# Patient Record
Sex: FEMALE | Race: WHITE | NOT HISPANIC OR LATINO | Employment: UNEMPLOYED | URBAN - METROPOLITAN AREA
[De-identification: names, ages, dates, MRNs, and addresses within clinical notes are randomized per-mention and may not be internally consistent; named-entity substitution may affect disease eponyms.]

---

## 2017-02-17 ENCOUNTER — TELEPHONE (OUTPATIENT)
Dept: GYNECOLOGIC ONCOLOGY | Facility: HOSPITAL | Age: 62
End: 2017-02-17

## 2017-02-17 DIAGNOSIS — R92.8 ABNORMAL MAMMOGRAM OF LEFT BREAST: Primary | ICD-10-CM

## 2017-02-17 NOTE — TELEPHONE ENCOUNTER
Focal asymmetry in the left breast is probably benign.  Follow-up in 6 months is  recommended.    Essentially stable mammograms with no definite evidence of malignancy  identified.  Correlation with clinical findings is suggested; if a clinical  abnormality is noted, then it is suggested to proceed on a clinical basis.  Otherwise, if none is noted, then follow up Bilat. Mammogra,s in 6 mts are  suggested.    ACR BI-RADS Category 3: Probably Benign      Recommended MMG ordered. msg to nurse to schedule.

## 2017-08-22 PROBLEM — Z01.419 WELL WOMAN EXAM: Status: ACTIVE | Noted: 2017-08-22

## 2018-02-23 ENCOUNTER — TELEPHONE (OUTPATIENT)
Dept: OBSTETRICS AND GYNECOLOGY | Facility: HOSPITAL | Age: 63
End: 2018-02-23

## 2018-02-23 DIAGNOSIS — R92.8 ABNORMAL MAMMOGRAM: Primary | ICD-10-CM

## 2019-09-10 ENCOUNTER — OFFICE VISIT (OUTPATIENT)
Dept: INTERNAL MEDICINE | Facility: CLINIC | Age: 64
End: 2019-09-10
Payer: MEDICARE

## 2019-09-10 ENCOUNTER — HOSPITAL ENCOUNTER (OUTPATIENT)
Dept: RADIOLOGY | Facility: HOSPITAL | Age: 64
Discharge: HOME | End: 2019-09-10
Attending: INTERNAL MEDICINE
Payer: MEDICARE

## 2019-09-10 VITALS
HEIGHT: 62 IN | DIASTOLIC BLOOD PRESSURE: 86 MMHG | SYSTOLIC BLOOD PRESSURE: 148 MMHG | BODY MASS INDEX: 25.03 KG/M2 | WEIGHT: 136 LBS

## 2019-09-10 DIAGNOSIS — M79.671 RIGHT FOOT PAIN: ICD-10-CM

## 2019-09-10 DIAGNOSIS — Z12.31 ENCOUNTER FOR SCREENING MAMMOGRAM FOR BREAST CANCER: ICD-10-CM

## 2019-09-10 DIAGNOSIS — Z13.6 SCREENING FOR CARDIOVASCULAR CONDITION: ICD-10-CM

## 2019-09-10 DIAGNOSIS — R03.0 ELEVATED BLOOD PRESSURE READING: ICD-10-CM

## 2019-09-10 DIAGNOSIS — M25.552 LEFT HIP PAIN: ICD-10-CM

## 2019-09-10 DIAGNOSIS — F40.243 FLYING PHOBIA: ICD-10-CM

## 2019-09-10 DIAGNOSIS — M25.552 LEFT HIP PAIN: Primary | ICD-10-CM

## 2019-09-10 DIAGNOSIS — Z96.641 STATUS POST TOTAL REPLACEMENT OF RIGHT HIP: ICD-10-CM

## 2019-09-10 PROCEDURE — 99204 PR OFFICE/OUTPT VISIT, NEW, LEVL IV, 45-59 MIN: ICD-10-PCS | Mod: S$GLB,,, | Performed by: INTERNAL MEDICINE

## 2019-09-10 PROCEDURE — 73502 X-RAY EXAM HIP UNI 2-3 VIEWS: CPT | Mod: 26,LT,, | Performed by: RADIOLOGY

## 2019-09-10 PROCEDURE — 1101F PT FALLS ASSESS-DOCD LE1/YR: CPT | Mod: CPTII,S$GLB,, | Performed by: INTERNAL MEDICINE

## 2019-09-10 PROCEDURE — 73630 XR FOOT COMPLETE 3 VIEW RIGHT: ICD-10-PCS | Mod: 26,RT,, | Performed by: RADIOLOGY

## 2019-09-10 PROCEDURE — 99999 PR PBB SHADOW E&M-EST. PATIENT-LVL IV: ICD-10-PCS | Mod: PBBFAC,,, | Performed by: INTERNAL MEDICINE

## 2019-09-10 PROCEDURE — 3008F BODY MASS INDEX DOCD: CPT | Mod: CPTII,S$GLB,, | Performed by: INTERNAL MEDICINE

## 2019-09-10 PROCEDURE — 73502 X-RAY EXAM HIP UNI 2-3 VIEWS: CPT | Mod: TC,LT

## 2019-09-10 PROCEDURE — 73502 XR HIP 2 VIEW LEFT: ICD-10-PCS | Mod: 26,LT,, | Performed by: RADIOLOGY

## 2019-09-10 PROCEDURE — 73630 X-RAY EXAM OF FOOT: CPT | Mod: 26,RT,, | Performed by: RADIOLOGY

## 2019-09-10 PROCEDURE — 99204 OFFICE O/P NEW MOD 45 MIN: CPT | Mod: S$GLB,,, | Performed by: INTERNAL MEDICINE

## 2019-09-10 PROCEDURE — 73630 X-RAY EXAM OF FOOT: CPT | Mod: TC,RT

## 2019-09-10 PROCEDURE — 1101F PR PT FALLS ASSESS DOC 0-1 FALLS W/OUT INJ PAST YR: ICD-10-PCS | Mod: CPTII,S$GLB,, | Performed by: INTERNAL MEDICINE

## 2019-09-10 PROCEDURE — 3008F PR BODY MASS INDEX (BMI) DOCUMENTED: ICD-10-PCS | Mod: CPTII,S$GLB,, | Performed by: INTERNAL MEDICINE

## 2019-09-10 PROCEDURE — 99999 PR PBB SHADOW E&M-EST. PATIENT-LVL IV: CPT | Mod: PBBFAC,,, | Performed by: INTERNAL MEDICINE

## 2019-09-10 RX ORDER — ALPRAZOLAM 0.25 MG/1
0.25 TABLET ORAL 3 TIMES DAILY PRN
Qty: 3 TABLET | Refills: 0 | Status: SHIPPED | OUTPATIENT
Start: 2019-09-10 | End: 2019-09-13

## 2019-09-10 NOTE — MEDICAL/APP STUDENT
"HPI: Mrs Wasserman is a 63 yo female who presents today to [facility] care. She has L sided hip pain that has been present for two years and has worsened. The pain is 8/10 and sharp. It radiates to the L groin and buttocks. It is alleviated by sitting and taking ibuprofen. The pain is worsened on climbing stairs and turning, which causes severe pain.    The pt also complains of bilateral foot pain. The right foot is painful on the ball of the foot and between the 3rd and fourth digits. It worsened in  when she walked barefoot on hard tile floor. The pain is sharp 9/10 and worsens on walking. It is worse at the end of the day. She has associated numbness and tingling of the R hallux after walking. Ibuprofen helps but does not completely alleviate the pain. The left foot pain is on the superior medial aspect and arch of the foot. The pain has been present for one year. She had an XRAY last yr that demonstrated arthritis. The pain is 7-8/10. It is worse in the evening and is alleviated by taking ibuprofen.    ROS: Neg except as above.    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 Former smoker <1 ppd/3yrs(during teens). Occasional ETOH, no illicit drug use.    Screening: colonoscopy  no abnormalities. Due for MMG.    P/E:BP (!) 148/86 (BP Location: Right arm, Patient Position: Sitting, BP Method: Medium (Manual))   Ht 5' 2" (1.575 m)   Wt 61.7 kg (136 lb)   BMI 24.87 kg/m² ]  HEENT: PERRL. Neck supple, no thyromegaly, no lymphadenopathy.  Card: RRR. S1/2 WNL. No added MRG  Resp: lungs clear to auscultation bilaterally  Hip: pain on hip flexion and external rotation  Foot: right  to " palpation between 3rd and 4th digits. Left foot non-tender. No edema, erythema.    Assessment:  1. Right hip pain  2. Bilateral foot pain  3. Annual screening    Plan:  1. Right hip xray, referral to orthopedics if abnormal  2. Bilateral foot xray, AP and lateral  3. Screening mmg  4. Serum lipids, TSH, CMP, CBC

## 2019-09-10 NOTE — PROGRESS NOTES
"Subjective:       Patient ID: Diann Wasserman is a 64 y.o. female.    Chief Complaint: Establish Care (is establishing care as a new patient. ); Foot Pain (reports of pain in ELLEN feet ( some numbness at joints of toes), started on last year. was prescribed Rx IBU to help. (takes 4 times a week).); Fall (had a fall about a year ago, sitting in a rolled chair, fell onto L wrist. ); and Hip Pain (reports of L hip pain. )    HPI   Diann Wasserman is a 64 y.o. female here to establish care for the following chronic issues:    Bilateral foot pain  -Right foot pain between 2nd and 3rd digits, ball of foot.  9/10 worse evening.   Ibuprofen helps.   Numbness right foot  -Left foot pain - told arthritis; started last year.     Left hip pain  S/p right hip OA post hip replacement 2014  Sharp 8/10 left pain, worse walking        Review of Systems   Constitutional: Negative for activity change and unexpected weight change.   HENT: Negative for hearing loss, rhinorrhea and trouble swallowing.    Eyes: Negative for discharge and visual disturbance.   Respiratory: Negative for chest tightness and wheezing.    Cardiovascular: Negative for chest pain and palpitations.   Gastrointestinal: Negative for blood in stool, constipation, diarrhea and vomiting.   Endocrine: Negative for polydipsia and polyuria.   Genitourinary: Negative for difficulty urinating, dysuria, hematuria and menstrual problem.   Musculoskeletal: Positive for arthralgias and neck pain. Negative for joint swelling.   Neurological: Positive for headaches. Negative for weakness.   Psychiatric/Behavioral: Negative for confusion and dysphoric mood.       Objective:   BP (!) 148/86 (BP Location: Right arm, Patient Position: Sitting, BP Method: Medium (Manual))   Ht 5' 2" (1.575 m)   Wt 61.7 kg (136 lb)   BMI 24.87 kg/m²      Physical Exam   Constitutional: She is oriented to person, place, and time. She appears well-developed and well-nourished. No " distress.   HENT:   Head: Normocephalic and atraumatic.   Cardiovascular: Normal rate and regular rhythm.   Pulmonary/Chest: Effort normal. No respiratory distress. She has no wheezes. She has no rales.   Musculoskeletal:   Discomfort with left hip flexion and external and internal rotation, Upper and lower extremity strength 5/5  No visible deformities bilateral feet  No swelling  No ttp right foot   Neurological: She is alert and oriented to person, place, and time.   Skin: Skin is warm and dry. She is not diaphoretic.   Psychiatric: She has a normal mood and affect. Her behavior is normal.       Assessment:       1. Left hip pain    2. Right foot pain    3. Screening for cardiovascular condition    4. Status post total replacement of right hip    5. Elevated blood pressure reading    6. Encounter for screening mammogram for breast cancer    7. Flying phobia        Plan:       Diann was seen today for establish care, foot pain, fall and hip pain.    Diagnoses and all orders for this visit:    Left hip pain  -     Ambulatory referral to Orthopedics; Future  -     X-Ray Hip 2 View Left; Future    Right foot pain - suspect Guido's neuroma  -     Ambulatory referral to Orthopedics; Future  -     X-Ray Foot Complete Right; Future    Screening for cardiovascular condition  -     Lipid panel; Future    Status post total replacement of right hip  Now w left hip pain    Elevated blood pressure reading  -     CBC auto differential; Future  -     Comprehensive metabolic panel; Future   Home bp log and review at nurse visit in 1 month    Encounter for screening mammogram for breast cancer  -     Mammo Digital Screening Bilat w/ [facility]; Future    Flying phobia  -     ALPRAZolam (XANAX) 0.25 MG tablet; Take 1 tablet (0.25 mg total) by mouth 3 (three) times daily as needed for Anxiety (flying). #3

## 2019-09-12 ENCOUNTER — TELEPHONE (OUTPATIENT)
Dept: ORTHOPEDICS | Facility: CLINIC | Age: 64
End: 2019-09-12

## 2019-09-12 NOTE — TELEPHONE ENCOUNTER
Spoke with pt to inform her that his appt with Ramiro JohnsonMihir on tomorrow morning had to be reschedule she opt to see another Provider. She will be seeing BC on tomorrow @8am.

## 2019-09-13 ENCOUNTER — OFFICE VISIT (OUTPATIENT)
Dept: ORTHOPEDICS | Facility: CLINIC | Age: 64
End: 2019-09-13
Payer: MEDICARE

## 2019-09-13 VITALS
DIASTOLIC BLOOD PRESSURE: 77 MMHG | WEIGHT: 136 LBS | HEART RATE: 61 BPM | BODY MASS INDEX: 25.03 KG/M2 | HEIGHT: 62 IN | SYSTOLIC BLOOD PRESSURE: 147 MMHG

## 2019-09-13 DIAGNOSIS — M25.552 LEFT HIP PAIN: ICD-10-CM

## 2019-09-13 DIAGNOSIS — M16.12 PRIMARY OSTEOARTHRITIS OF LEFT HIP: Primary | ICD-10-CM

## 2019-09-13 DIAGNOSIS — M79.671 RIGHT FOOT PAIN: ICD-10-CM

## 2019-09-13 PROCEDURE — 99203 PR OFFICE/OUTPT VISIT, NEW, LEVL III, 30-44 MIN: ICD-10-PCS | Mod: S$GLB,,, | Performed by: PHYSICIAN ASSISTANT

## 2019-09-13 PROCEDURE — 3008F PR BODY MASS INDEX (BMI) DOCUMENTED: ICD-10-PCS | Mod: CPTII,S$GLB,, | Performed by: PHYSICIAN ASSISTANT

## 2019-09-13 PROCEDURE — 99999 PR PBB SHADOW E&M-EST. PATIENT-LVL III: CPT | Mod: PBBFAC,,, | Performed by: PHYSICIAN ASSISTANT

## 2019-09-13 PROCEDURE — 1101F PT FALLS ASSESS-DOCD LE1/YR: CPT | Mod: CPTII,S$GLB,, | Performed by: PHYSICIAN ASSISTANT

## 2019-09-13 PROCEDURE — 99999 PR PBB SHADOW E&M-EST. PATIENT-LVL III: ICD-10-PCS | Mod: PBBFAC,,, | Performed by: PHYSICIAN ASSISTANT

## 2019-09-13 PROCEDURE — 99203 OFFICE O/P NEW LOW 30 MIN: CPT | Mod: S$GLB,,, | Performed by: PHYSICIAN ASSISTANT

## 2019-09-13 PROCEDURE — 3008F BODY MASS INDEX DOCD: CPT | Mod: CPTII,S$GLB,, | Performed by: PHYSICIAN ASSISTANT

## 2019-09-13 PROCEDURE — 1101F PR PT FALLS ASSESS DOC 0-1 FALLS W/OUT INJ PAST YR: ICD-10-PCS | Mod: CPTII,S$GLB,, | Performed by: PHYSICIAN ASSISTANT

## 2019-09-13 NOTE — PROGRESS NOTES
SUBJECTIVE:     Chief Complaint & History of Present Illness:  Diann Wasserman is a 64 y.o. year old female, new patient referred to Orthopedics by Dr. Greco, presenting today for intermittent left hip pain which started 2 years ago and right foot pain which started 2 months ago.  There is not a history of trauma.  The pain is located in the groin aspect of the hip.  The pain is described as sharp, 2/10.  It is is worse with weight bearing and is aggravated by walking.  There is not radiation into the leg.  Previous treatments include OTC NSAIDs and rest which have provided good relief.  There is not a history of previous injury or surgery to the hip.  The patient does not use an assistive device.    She reports that her right foot pain located between her 1st and 2nd metatarsal that does radiate into her toes.  She states the pain is mostly present when she is ambulating for a prolonged period of time.  She describes pain as sharp/tingling pain, 2/10.  She does get relief with OTC NSAID.        Past Medical History:   Diagnosis Date    Arthritis     Right hip pain        Past Surgical History:   Procedure Laterality Date    ARTHROPLASTY-HIP Right 11/6/2014    Performed by [facility] at [facility] OR    BREAST SURGERY Bilateral     breast lift    CHOLECYSTECTOMY  2012    COLONOSCOPY N/A 4/6/2016    Performed by Luis Bogran-Reyes, MD at [facility] ENDO    COSMETIC SURGERY  1985    bilateral breast lift    HIP SURGERY  11/2014    right total    JOINT REPLACEMENT      TUBAL LIGATION         Family History   Problem Relation Age of Onset    Breast cancer Maternal Aunt 40    Breast cancer Cousin     Hypothyroidism Mother     Hypertension Father     Aortic aneurysm Father        Review of patient's allergies indicates:   Allergen Reactions    Erythromycin Rash    Penicillins Rash and Other (See Comments)     Redness         Current Outpatient Medications:     ALPRAZolam (XANAX) 0.25 MG tablet,  Take 1 tablet (0.25 mg total) by mouth 3 (three) times daily as needed for Anxiety (flying)., Disp: 3 tablet, Rfl: 0    ibuprofen (ADVIL,MOTRIN) 600 MG tablet, Take 1 tablet (600 mg total) by mouth 2 (two) times daily as needed for Pain., Disp: 60 tablet, Rfl: 1    Review of Systems:  ROS:  Constitutional: no fever or chills  Eyes: no visual changes  ENT: no nasal congestion or sore throat  Respiratory: no cough or shortness of breath  Cardiovascular: no chest pain or palpitations  Gastrointestinal: no nausea or vomiting, tolerating diet  Genitourinary: no hematuria or dysuria  Integument/Breast: no rash or pruritis  Hematologic/Lymphatic: no easy bruising or lymphadenopathy  Musculoskeletal: positive for left hip pain and right foot pain  Neurological: no seizures or tremors  Behavioral/Psych: no auditory or visual hallucinations  Endocrine: no heat or cold intolerance      PE:  There were no vitals taken for this visit.  General: Pleasant, cooperative, NAD   HEENT: NCAT, sclera nonicteric   Lungs: Respirations are equal and unlabored.   Abdomen: Soft and non-tender.  CV: 2+ bilateral upper and lower extremity pulses.   Skin: Intact throughout LE with no rashes, erythema, or lesions  Extremities: No LE edema, NVI lower extremities    Hip Exam:  left  115 degrees flexion with pain in groin  0 degrees extension   15 degrees internal rotation with pain in groin  25 degrees external rotation with pain in groin  25 degrees abduction  10 degrees adduction   0 flexion contracture  (-)TTP over GTB  (-)SLR    right  Foot/Ankle  Skin: normal  Swelling: none  Warmth: no warmth  Tenderness: mild between 1st and 2nd distal metatarsal  Gait: normal  Neurological Exam: normal  Vascular Exam: normal    RADIOGRAPHS:  Xray of left hip taken on 9/10/19, images reviewed by me in clinic today, demonstrates minimal narrowing of joint space without evidence of fracture or dislocation.    Xray of right foot taken on 9/10/19, images  reviewed by me in clinic today, demonstrates well maintained joint spaces without evidence of fracture or dislocation. There is a inferior calcaneal heel spur.    ASSESSMENT/PLAN:     Plan: We discussed with the patient at length all the different treatment options available for arthrosis of the hip including anti-inflammatories, acetaminophen, rest, ice, lower extremity strengthening exercise, occasional cortisone injections for temporary relief, and finally total hip arthroplasty.   Patient referral placed to PT for left hip pain due to arthritis and possible labral injury.  She is to take OTC NSAID as needed and alternating this with Tylneol PRN.  If her symptoms persist at follow up, will likely order MRI of left hip.      I have recommended she purchase shoe inserts for her feet.  Guido's neuroma possible, however she is not interested in any surgery or injections at this time.  She is to follow up with me if this pain worsens.  Otherwise, continue OTC NSAID/Tylenol.  Patient verbalized understanding.    Final Diagnosis: Primary osteoarthritis hip, left. Left hip pain.  Right foot pain.

## 2019-09-13 NOTE — LETTER
September 13, 2019      Virginia Greco MD  [address]  [facility] [address]           [facility] - Orthopedics  [address], [address]  [facility] [address]  Phone: [phone]          Patient: Diann Wasserman   MR Number: [id number]   YOB: 1955   Date of Visit: 9/13/2019       Dear Dr. Virginia Greco:    Thank you for referring Diann Wasserman to me for evaluation. Attached you will find relevant portions of my assessment and plan of care.    If you have questions, please do not hesitate to call me. I look forward to following Diann Wasserman along with you.    Sincerely,    FERNANDA Collazoosure  CC:  No Recipients    If you would like to receive this communication electronically, please contact [email or web address] or [phone] to request more information on [facility] [employer] [facility] access.    For providers and/or their staff who would like to refer a patient to [facility], please contact us through our one-stop-shop provider referral line, [facility], at [phone].    If you feel you have received this communication in error or would no longer like to receive these types of communications, please e-mail [email or web address]

## 2019-09-24 ENCOUNTER — CLINICAL SUPPORT (OUTPATIENT)
Dept: INTERNAL MEDICINE | Facility: CLINIC | Age: 64
End: 2019-09-24
Payer: MEDICARE

## 2019-09-24 ENCOUNTER — TELEPHONE (OUTPATIENT)
Dept: INTERNAL MEDICINE | Facility: CLINIC | Age: 64
End: 2019-09-24

## 2019-09-24 ENCOUNTER — HOSPITAL ENCOUNTER (OUTPATIENT)
Dept: RADIOLOGY | Facility: HOSPITAL | Age: 64
Discharge: HOME | End: 2019-09-24
Attending: INTERNAL MEDICINE
Payer: MEDICARE

## 2019-09-24 VITALS — SYSTOLIC BLOOD PRESSURE: 146 MMHG | DIASTOLIC BLOOD PRESSURE: 82 MMHG

## 2019-09-24 DIAGNOSIS — Z12.31 ENCOUNTER FOR SCREENING MAMMOGRAM FOR BREAST CANCER: ICD-10-CM

## 2019-09-24 PROCEDURE — 77067 MAMMO DIGITAL SCREENING BILAT WITH TOMOSYNTHESIS_CAD: ICD-10-PCS | Mod: 26,,, | Performed by: RADIOLOGY

## 2019-09-24 PROCEDURE — 77067 SCR MAMMO BI INCL CAD: CPT | Mod: 26,,, | Performed by: RADIOLOGY

## 2019-09-24 PROCEDURE — 77063 MAMMO DIGITAL SCREENING BILAT WITH TOMOSYNTHESIS_CAD: ICD-10-PCS | Mod: 26,,, | Performed by: RADIOLOGY

## 2019-09-24 PROCEDURE — 77063 BREAST TOMOSYNTHESIS BI: CPT | Mod: 26,,, | Performed by: RADIOLOGY

## 2019-09-24 PROCEDURE — 99999 PR PBB SHADOW E&M-EST. PATIENT-LVL I: CPT | Mod: PBBFAC,,,

## 2019-09-24 PROCEDURE — 77067 SCR MAMMO BI INCL CAD: CPT | Mod: TC

## 2019-09-24 PROCEDURE — 99999 PR PBB SHADOW E&M-EST. PATIENT-LVL I: ICD-10-PCS | Mod: PBBFAC,,,

## 2019-09-24 NOTE — TELEPHONE ENCOUNTER
Pt came in today for BP check and provided reading from home. (placed on desk)  /82   Pt says she was very nervous and normally is when coming to the clinic.

## 2019-09-24 NOTE — TELEPHONE ENCOUNTER
Spoke with pt, advised to return to clinic with BP cuff so we can compare readings. verbalized understanding

## 2019-09-26 ENCOUNTER — TELEPHONE (OUTPATIENT)
Dept: INTERNAL MEDICINE | Facility: CLINIC | Age: 64
End: 2019-09-26

## 2019-09-26 ENCOUNTER — CLINICAL SUPPORT (OUTPATIENT)
Dept: INTERNAL MEDICINE | Facility: CLINIC | Age: 64
End: 2019-09-26
Payer: MEDICARE

## 2019-09-26 VITALS — DIASTOLIC BLOOD PRESSURE: 80 MMHG | SYSTOLIC BLOOD PRESSURE: 138 MMHG

## 2019-09-26 PROCEDURE — 99999 PR PBB SHADOW E&M-EST. PATIENT-LVL I: ICD-10-PCS | Mod: PBBFAC,,,

## 2019-09-26 PROCEDURE — 99999 PR PBB SHADOW E&M-EST. PATIENT-LVL I: CPT | Mod: PBBFAC,,,

## 2019-09-26 NOTE — TELEPHONE ENCOUNTER
Pt returned for BP check with home cuff to make sure home readings are accurate.   Manual /80  Automatic home cuff /82  Pt says she did not drink any coffee this morning (she normally has 2 cups of coffee in the morning) and I let her sit for a few minutes to relax.   Advised pt to continue to check readings at home and keep a log

## 2019-09-26 NOTE — PROGRESS NOTES
Pt returned for BP check with home cuff to make sure home readings are accurate.   Manual /80  Automatic home cuff /82  Advised pt to continue to check readings at home and keep a log.  PCP notified

## 2019-10-12 ENCOUNTER — OFFICE VISIT (OUTPATIENT)
Dept: URGENT CARE | Facility: CLINIC | Age: 64
End: 2019-10-12
Payer: MEDICARE

## 2019-10-12 VITALS
SYSTOLIC BLOOD PRESSURE: 139 MMHG | DIASTOLIC BLOOD PRESSURE: 82 MMHG | HEIGHT: 62 IN | RESPIRATION RATE: 20 BRPM | HEART RATE: 60 BPM | OXYGEN SATURATION: 98 % | WEIGHT: 134 LBS | TEMPERATURE: 97 F | BODY MASS INDEX: 24.66 KG/M2

## 2019-10-12 DIAGNOSIS — R30.0 DYSURIA: ICD-10-CM

## 2019-10-12 DIAGNOSIS — N30.01 ACUTE CYSTITIS WITH HEMATURIA: Primary | ICD-10-CM

## 2019-10-12 DIAGNOSIS — R35.0 URINARY FREQUENCY: ICD-10-CM

## 2019-10-12 DIAGNOSIS — R31.29 MICROSCOPIC HEMATURIA: ICD-10-CM

## 2019-10-12 LAB
BILIRUB UR QL STRIP: NEGATIVE
GLUCOSE UR QL STRIP: NEGATIVE
KETONES UR QL STRIP: NEGATIVE
LEUKOCYTE ESTERASE UR QL STRIP: NEGATIVE
PH, POC UA: 5 (ref 5–8)
POC BLOOD, URINE: POSITIVE
POC NITRATES, URINE: NEGATIVE
PROT UR QL STRIP: NEGATIVE
SP GR UR STRIP: 1 (ref 1–1.03)
UROBILINOGEN UR STRIP-ACNC: NORMAL (ref 0.1–1.1)

## 2019-10-12 PROCEDURE — 99214 PR OFFICE/OUTPT VISIT, EST, LEVL IV, 30-39 MIN: ICD-10-PCS | Mod: S$GLB,,, | Performed by: NURSE PRACTITIONER

## 2019-10-12 PROCEDURE — 87086 URINE CULTURE/COLONY COUNT: CPT

## 2019-10-12 PROCEDURE — 99214 OFFICE O/P EST MOD 30 MIN: CPT | Mod: S$GLB,,, | Performed by: NURSE PRACTITIONER

## 2019-10-12 PROCEDURE — 81003 URINALYSIS AUTO W/O SCOPE: CPT | Mod: QW,S$GLB,, | Performed by: NURSE PRACTITIONER

## 2019-10-12 PROCEDURE — 3008F BODY MASS INDEX DOCD: CPT | Mod: CPTII,S$GLB,, | Performed by: NURSE PRACTITIONER

## 2019-10-12 PROCEDURE — 3008F PR BODY MASS INDEX (BMI) DOCUMENTED: ICD-10-PCS | Mod: CPTII,S$GLB,, | Performed by: NURSE PRACTITIONER

## 2019-10-12 PROCEDURE — 1101F PT FALLS ASSESS-DOCD LE1/YR: CPT | Mod: CPTII,S$GLB,, | Performed by: NURSE PRACTITIONER

## 2019-10-12 PROCEDURE — 1101F PR PT FALLS ASSESS DOC 0-1 FALLS W/OUT INJ PAST YR: ICD-10-PCS | Mod: CPTII,S$GLB,, | Performed by: NURSE PRACTITIONER

## 2019-10-12 PROCEDURE — 74018 RADEX ABDOMEN 1 VIEW: CPT | Mod: S$GLB,,, | Performed by: RADIOLOGY

## 2019-10-12 PROCEDURE — 74018 XR KUB: ICD-10-PCS | Mod: S$GLB,,, | Performed by: RADIOLOGY

## 2019-10-12 PROCEDURE — 81003 POCT URINALYSIS, DIPSTICK, AUTOMATED, W/O SCOPE: ICD-10-PCS | Mod: QW,S$GLB,, | Performed by: NURSE PRACTITIONER

## 2019-10-12 RX ORDER — NITROFURANTOIN 25; 75 MG/1; MG/1
100 CAPSULE ORAL 2 TIMES DAILY
Qty: 10 CAPSULE | Refills: 0 | Status: SHIPPED | OUTPATIENT
Start: 2019-10-12 | End: 2019-10-17

## 2019-10-12 NOTE — PROGRESS NOTES
"Subjective:       Patient ID: Diann Wasserman is a 64 y.o. female.    Vitals:  height is 5' 2" (1.575 m) and weight is 60.8 kg (134 lb). Her temperature is 97 °F (36.1 °C). Her blood pressure is 139/82 and her pulse is 60. Her respiration is 20 and oxygen saturation is 98%.     Chief Complaint: Urinary Tract Infection    This is a 64 y.o. female who presents today with a chief complaint of UTI which started yesterday.  Patient has in increase in frequency, flank pain, and pressure.  She has taken Ibuprofen with some relief.     Urinary Tract Infection    This is a new problem. The current episode started yesterday. The problem occurs every urination. The problem has been gradually worsening. The quality of the pain is described as aching. The pain is at a severity of 5/10. The pain is moderate. There has been no fever. Associated symptoms include flank pain and frequency. Pertinent negatives include no chills, hematuria, nausea, urgency, vomiting or rash. Treatments tried: Ibuprofen. The treatment provided mild relief.       Constitution: Negative for chills and fever.   Neck: Negative for painful lymph nodes.   Gastrointestinal: Negative for abdominal pain, nausea and vomiting.   Genitourinary: Positive for frequency and flank pain. Negative for dysuria, urgency, urine decreased, hematuria, history of kidney stones, painful menstruation, irregular menstruation, missed menses, heavy menstrual bleeding, ovarian cysts, genital trauma, vaginal pain, vaginal discharge, vaginal bleeding, vaginal odor, painful intercourse, genital sore, painful ejaculation and pelvic pain.   Musculoskeletal: Negative for back pain.   Skin: Negative for rash and lesion.   Hematologic/Lymphatic: Negative for swollen lymph nodes.       Objective:      Physical Exam   Constitutional: She is oriented to person, place, and time. She appears well-developed and well-nourished.   HENT:   Head: Normocephalic and atraumatic.   Right Ear: " External ear normal.   Left Ear: External ear normal.   Nose: Nose normal. No nasal deformity. No epistaxis.   Mouth/Throat: Oropharynx is clear and moist and mucous membranes are normal.   Eyes: Lids are normal.   Neck: Trachea normal, normal range of motion and phonation normal. Neck supple.   Cardiovascular: Normal pulses.   Pulmonary/Chest: Effort normal.   Abdominal: Soft. Normal appearance and bowel sounds are normal. She exhibits no distension. There is tenderness in the right lower quadrant. There is CVA tenderness (right).   Neurological: She is alert and oriented to person, place, and time.   Skin: Skin is warm, dry and intact.   Psychiatric: She has a normal mood and affect. Her speech is normal and behavior is normal. Cognition and memory are normal.   Nursing note and vitals reviewed.          Results for orders placed or performed in visit on 10/12/19   POCT Urinalysis, Dipstick, Automated, W/O Scope   Result Value Ref Range    POC Blood, Urine Positive (A) Negative    POC Bilirubin, Urine Negative Negative    POC Urobilinogen, Urine normal 0.1 - 1.1    POC Ketones, Urine Negative Negative    POC Protein, Urine Negative Negative    POC Nitrates, Urine Negative Negative    POC Glucose, Urine Negative Negative    pH, UA 5.0 5 - 8    POC Specific Gravity, Urine 1.005 1.003 - 1.029    POC Leukocytes, Urine Negative Negative    urine specimen visualized: clear, yellow      Xr Kub    Result Date: 10/12/2019  EXAMINATION: XR KUB CLINICAL HISTORY: Dysuria TECHNIQUE: Single AP supine view of the abdomen (KUB) was performed COMPARISON: None FINDINGS: Right total hip arthroplasty.  Probable phleboliths in the pelvis.  No renal stones, noting limited evaluation due to overlying soft tissue/bowel.  Further evaluation could be performed with CT, if indicated.  Surgical clips in the right upper quadrant.  Mild dextrocurvature to the lumbar spine.  Mildly prominent gas-filled loop of small bowel in the left abdomen.      No acute findings. Electronically signed by: Ar Wellington MD Date:    10/12/2019 Time:    11:01    Assessment:       1. Acute cystitis with hematuria    2. Dysuria    3. Microscopic hematuria    4. Urinary frequency        Plan:         Acute cystitis with hematuria    Dysuria  -     POCT Urinalysis, Dipstick, Automated, W/O Scope  -     Culture, Urine  -     XR KUB; Future; Expected date: 10/12/2019    Microscopic hematuria  -     XR KUB; Future; Expected date: 10/12/2019    Urinary frequency    Other orders  -     nitrofurantoin, macrocrystal-monohydrate, (MACROBID) 100 MG capsule; Take 1 capsule (100 mg total) by mouth 2 (two) times daily. for 5 days  Dispense: 10 capsule; Refill: 0        UTI vs. Kidney/ureteral stone-- xray did not show kidney stone-- ER precautions given to patient

## 2019-10-12 NOTE — PATIENT INSTRUCTIONS
Return to Urgent Care or go to ER if symptoms worsen or fail to improve.  Follow up with PCP as recommended for further management.        Understanding Urinary Tract Infections (UTIs)  Most UTIs are caused by bacteria, although they may also be caused by viruses or fungi. Bacteria from the bowel are the most common source of infection. The infection may start because of any of the following:  · Sexual activity. During sex, bacteria can travel from the penis, vagina, or rectum into the urethra.   · Bacteria on the skin outside the rectum may travel into the urethra. This is more common in women since the rectum and urethra are closer to each other than in men. Wiping from front to back after using the toilet and keeping the area clean can help prevent germs from getting to the urethra.  · Blockage of urine flow through the urinary tract. If urine sits too long, germs may start to grow out of control.      Parts of the urinary tract  The infection can occur in any part of the urinary tract.  · The kidneys collect and store urine.  · The ureters carry urine from the kidneys to the bladder.  · The bladder holds urine until you are ready to let it out.  · The urethra carries urine from the bladder out of the body. It is shorter in women, so bacteria can move through it more easily. The urethra is longer in men, so a UTI is less likely to reach the bladder or kidneys in men.  Date Last Reviewed: 1/1/2017 © 2000-2017 [employer]. [address] [place] [id number]. All rights reserved. This information is not intended as a substitute for professional medical care. Always follow your healthcare professional's instructions.        Hematuria: Possible Causes     Many things can lead to blood in the urine (hematuria). The blood may be seen with the eye (macroscopic or gross hematuria). Or it may only be seen when the urine is looked at under a microscope (microscopic hematuria). Some of the most common  causes of blood in the urine are listed below. Often, no cause for the blood can be found. This is called idiopathic hematuria.  · Kidney or bladder stones are collections of crystals. They form in the urine. Stones may be found anywhere in the urinary tract. But they form most often in the kidneys or bladder. In addition to blood in the urine, they can cause severe pain.  · BPH stands for benign prostatic hyperplasia. It is enlargement of the prostate gland. It happens as men age. BPH often causes problems with urination. It sometimes causes blood in the urine.  · A urinary tract infection (UTI) is due to bacteria growing in the urinary tract. It can cause blood in the urine. Other symptoms include burning or pain with urination. You may need to urinate often or urgently. You may also have a fever.  · Damage to the urinary tract may cause blood in the urine. This damage may be due to a blow or accident. It may also result from the use of a urinary catheter. Very hard exercise may sometimes irritate the urinary tract and cause bleeding.  · Cancer may occur anywhere in the urinary tract. A tumor may sometimes cause no symptoms other than bleeding.     Other possible causes of bleeding include:  · Prostatitis (infection of the prostate gland)  · Taking anticoagulants  · Blockage in the urinary tract  · Disease or inflammation of the kidney  · Cystic diseases of the kidneys  · Sickle cell anemia  · Vigorous exercise  · Endometriosis  Date Last Reviewed: 12/1/2016  © 4264-2493 [employer]. [address] [place] [address]. All rights reserved. This information is not intended as a substitute for professional medical care. Always follow your healthcare professional's instructions.

## 2019-10-14 ENCOUNTER — HOSPITAL ENCOUNTER (OUTPATIENT)
Dept: RADIOLOGY | Facility: HOSPITAL | Age: 64
Discharge: HOME | End: 2019-10-14
Attending: INTERNAL MEDICINE
Payer: MEDICARE

## 2019-10-14 ENCOUNTER — PATIENT OUTREACH (OUTPATIENT)
Dept: ADMINISTRATIVE | Facility: OTHER | Age: 64
End: 2019-10-14

## 2019-10-14 ENCOUNTER — OFFICE VISIT (OUTPATIENT)
Dept: INTERNAL MEDICINE | Facility: CLINIC | Age: 64
End: 2019-10-14
Payer: MEDICARE

## 2019-10-14 VITALS
HEART RATE: 66 BPM | BODY MASS INDEX: 25.03 KG/M2 | OXYGEN SATURATION: 99 % | SYSTOLIC BLOOD PRESSURE: 120 MMHG | DIASTOLIC BLOOD PRESSURE: 82 MMHG | HEIGHT: 62 IN | WEIGHT: 136 LBS

## 2019-10-14 DIAGNOSIS — R39.15 URGENCY OF MICTURITION: Primary | ICD-10-CM

## 2019-10-14 DIAGNOSIS — R35.0 FREQUENCY OF MICTURITION: ICD-10-CM

## 2019-10-14 DIAGNOSIS — R31.9 HEMATURIA, UNSPECIFIED TYPE: ICD-10-CM

## 2019-10-14 DIAGNOSIS — R39.15 URGENCY OF MICTURITION: ICD-10-CM

## 2019-10-14 LAB
BACTERIA UR CULT: NORMAL
BACTERIA UR CULT: NORMAL

## 2019-10-14 PROCEDURE — 99999 PR PBB SHADOW E&M-EST. PATIENT-LVL IV: ICD-10-PCS | Mod: PBBFAC,,, | Performed by: INTERNAL MEDICINE

## 2019-10-14 PROCEDURE — 99214 PR OFFICE/OUTPT VISIT, EST, LEVL IV, 30-39 MIN: ICD-10-PCS | Mod: S$GLB,,, | Performed by: INTERNAL MEDICINE

## 2019-10-14 PROCEDURE — 3008F BODY MASS INDEX DOCD: CPT | Mod: CPTII,S$GLB,, | Performed by: INTERNAL MEDICINE

## 2019-10-14 PROCEDURE — 1101F PR PT FALLS ASSESS DOC 0-1 FALLS W/OUT INJ PAST YR: ICD-10-PCS | Mod: CPTII,S$GLB,, | Performed by: INTERNAL MEDICINE

## 2019-10-14 PROCEDURE — 3008F PR BODY MASS INDEX (BMI) DOCUMENTED: ICD-10-PCS | Mod: CPTII,S$GLB,, | Performed by: INTERNAL MEDICINE

## 2019-10-14 PROCEDURE — 99214 OFFICE O/P EST MOD 30 MIN: CPT | Mod: S$GLB,,, | Performed by: INTERNAL MEDICINE

## 2019-10-14 PROCEDURE — 99999 PR PBB SHADOW E&M-EST. PATIENT-LVL IV: CPT | Mod: PBBFAC,,, | Performed by: INTERNAL MEDICINE

## 2019-10-14 PROCEDURE — 76830 TRANSVAGINAL US NON-OB: CPT | Mod: 26,,, | Performed by: INTERNAL MEDICINE

## 2019-10-14 PROCEDURE — 76830 TRANSVAGINAL US NON-OB: CPT | Mod: TC

## 2019-10-14 PROCEDURE — 1101F PT FALLS ASSESS-DOCD LE1/YR: CPT | Mod: CPTII,S$GLB,, | Performed by: INTERNAL MEDICINE

## 2019-10-14 PROCEDURE — 76856 US EXAM PELVIC COMPLETE: CPT | Mod: 26,,, | Performed by: INTERNAL MEDICINE

## 2019-10-14 PROCEDURE — 76856 US PELVIS COMP WITH TRANSVAG NON-OB (XPD): ICD-10-PCS | Mod: 26,,, | Performed by: INTERNAL MEDICINE

## 2019-10-14 PROCEDURE — 76830 US PELVIS COMP WITH TRANSVAG NON-OB (XPD): ICD-10-PCS | Mod: 26,,, | Performed by: INTERNAL MEDICINE

## 2019-10-14 NOTE — PROGRESS NOTES
Subjective:       Patient ID: Diann Wasserman is a 64 y.o. female.    Chief Complaint: Follow-up (ER)    HPI:  Patient usually sees , she is a walk up urgent care for pain from possible UTI versus frequency versus urgency.  No dysuria per the patient.  She has been taking Macrobid for nearly 3 days with little relief.  She had abrupt onset urgency, pain over the bladder, frequency.  UTI was diagnosed by urgent care when the patient had about 10 red cells in her urine.  Culture however was negative and she says the Macrobid has not helped at all.  She says the urgency and frequency including even just passing small amounts urine has been problematic.  She has slight history of stress incontinence but has not had a hysterectomy and denies any other GI or   or gyn problems.  No back pain or rashes.  No visible blood in the urine    Review of Systems   Constitutional: Negative for appetite change, chills and fever.   HENT: Negative for nosebleeds, sinus pain and sore throat.    Eyes: Negative for visual disturbance.   Respiratory: Negative for cough, shortness of breath and wheezing.    Cardiovascular: Negative for chest pain and leg swelling.   Gastrointestinal: Negative for abdominal pain, constipation and diarrhea.   Genitourinary: Positive for frequency, hematuria (On dipstick) and urgency. Negative for difficulty urinating and dysuria.   Musculoskeletal: Negative for neck pain and neck stiffness.   Skin: Negative for pallor and rash.   Neurological: Negative for headaches.   Psychiatric/Behavioral: Negative for dysphoric mood and suicidal ideas. The patient is not nervous/anxious.        Objective:      Physical Exam   Constitutional: She is oriented to person, place, and time. She appears well-developed and well-nourished.   Cardiovascular: Normal rate and regular rhythm.   Pulmonary/Chest: Effort normal and breath sounds normal.   Abdominal: Soft. Bowel sounds are normal. She exhibits no  distension and no mass. There is tenderness (Suprapubic). There is no guarding. No hernia.   Musculoskeletal: She exhibits no edema or deformity.   Neurological: She is alert and oriented to person, place, and time.   Skin: Skin is warm and dry. Capillary refill takes less than 2 seconds.   Psychiatric: She has a normal mood and affect. Her behavior is normal.       Assessment:       1. Urgency incontinence    2. Urgency of micturition    3. Frequency of micturition        Plan:       Diann was seen today for follow-up.    Diagnoses and all orders for this visit:    Urgency of micturition  -     US Pelvis Complete Non OB; Future  -     Ambulatory referral to Urology    Frequency of micturition  -     US Pelvis Complete Non OB; Future  -     Ambulatory referral to Urology    Hematuria, unspecified type          Trial of AZO to see if that helps symptoms which could indicate this is more of a urinary infection or inflammation issue.  Ultrasound Urology evaluation likely needed.

## 2019-10-15 ENCOUNTER — TELEPHONE (OUTPATIENT)
Dept: INTERNAL MEDICINE | Facility: CLINIC | Age: 64
End: 2019-10-15

## 2019-10-15 ENCOUNTER — OFFICE VISIT (OUTPATIENT)
Dept: UROLOGY | Facility: CLINIC | Age: 64
End: 2019-10-15
Payer: MEDICARE

## 2019-10-15 VITALS
DIASTOLIC BLOOD PRESSURE: 79 MMHG | BODY MASS INDEX: 24.78 KG/M2 | HEART RATE: 65 BPM | HEIGHT: 62 IN | SYSTOLIC BLOOD PRESSURE: 137 MMHG | WEIGHT: 134.69 LBS

## 2019-10-15 DIAGNOSIS — N30.00 ACUTE CYSTITIS WITHOUT HEMATURIA: Primary | ICD-10-CM

## 2019-10-15 PROCEDURE — 87086 URINE CULTURE/COLONY COUNT: CPT

## 2019-10-15 PROCEDURE — 1101F PR PT FALLS ASSESS DOC 0-1 FALLS W/OUT INJ PAST YR: ICD-10-PCS | Mod: CPTII,S$GLB,, | Performed by: UROLOGY

## 2019-10-15 PROCEDURE — 3008F BODY MASS INDEX DOCD: CPT | Mod: CPTII,S$GLB,, | Performed by: UROLOGY

## 2019-10-15 PROCEDURE — 99204 OFFICE O/P NEW MOD 45 MIN: CPT | Mod: S$GLB,,, | Performed by: UROLOGY

## 2019-10-15 PROCEDURE — 99999 PR PBB SHADOW E&M-EST. PATIENT-LVL III: CPT | Mod: PBBFAC,,, | Performed by: UROLOGY

## 2019-10-15 PROCEDURE — 99204 PR OFFICE/OUTPT VISIT, NEW, LEVL IV, 45-59 MIN: ICD-10-PCS | Mod: S$GLB,,, | Performed by: UROLOGY

## 2019-10-15 PROCEDURE — 1101F PT FALLS ASSESS-DOCD LE1/YR: CPT | Mod: CPTII,S$GLB,, | Performed by: UROLOGY

## 2019-10-15 PROCEDURE — 99999 PR PBB SHADOW E&M-EST. PATIENT-LVL III: ICD-10-PCS | Mod: PBBFAC,,, | Performed by: UROLOGY

## 2019-10-15 PROCEDURE — 3008F PR BODY MASS INDEX (BMI) DOCUMENTED: ICD-10-PCS | Mod: CPTII,S$GLB,, | Performed by: UROLOGY

## 2019-10-15 RX ORDER — OXYBUTYNIN CHLORIDE 10 MG/1
10 TABLET, EXTENDED RELEASE ORAL DAILY
Qty: 30 TABLET | Refills: 11 | Status: SHIPPED | OUTPATIENT
Start: 2019-10-15 | End: 2021-10-27

## 2019-10-15 RX ORDER — NITROFURANTOIN 25; 75 MG/1; MG/1
100 CAPSULE ORAL 2 TIMES DAILY
Qty: 20 CAPSULE | Refills: 1 | Status: SHIPPED | OUTPATIENT
Start: 2019-10-15 | End: 2021-10-27

## 2019-10-15 NOTE — LETTER
October 15, 2019      Wes Garcias MD  [address]  [facility] [address]           [facility] 4th Floor  [address]  [facility] [address]  Phone: [phone]          Patient: Diann Wasserman   MR Number: [id number]   YOB: 1955   Date of Visit: 10/15/2019       Dear Dr. Wes Garcias:    Thank you for referring Diann Wasserman to me for evaluation. Attached you will find relevant portions of my assessment and plan of care.    If you have questions, please do not hesitate to call me. I look forward to following Diann Wasserman along with you.    Sincerely,    Jean Weathers Jr., MD    Enclosure  CC:  No Recipients    If you would like to receive this communication electronically, please contact [email or web address] or [phone] to request more information on [employer] Link access.    For providers and/or their staff who would like to refer a patient to [facility], please contact us through our one-stop-shop provider referral line, [facility], at [phone].    If you feel you have received this communication in error or would no longer like to receive these types of communications, please e-mail [email or web address]

## 2019-10-15 NOTE — PROGRESS NOTES
Subjective:       Patient ID: Diann Wasserman is a 64 y.o. female.    Chief Complaint: Advice Only (c/o urgency frequency with urination she had problems since friday with some shooting pain in poss bladder , uretheral .)    HPI   Diann Wasserman is a 64 y.o. female with a PMHx of arthritis who presents to the clinic for evaluation of urinary urgency. She saw Internal Med yesterday c/o urgency and frequency, and her urine dip showed hematuria. Urgent Care had prescribed her Macrobid on 10/12, but this was not helping her symptoms; her urine culture came back negative. KUB and US Pelvis did not show significant abnormality to explain her urinary symptoms. Patient explains starting Friday she had pain and pressure in her lower abdomen and frequency but did not always produce a lot of urine. She denies fever, chills, flank pain, and gross hematuria. She started taking Azo Standard yesterday, which she explains has helped.     Past Medical History:   Diagnosis Date    Arthritis     Right hip pain        Past Surgical History:   Procedure Laterality Date    BREAST SURGERY Bilateral     breast lift    CHOLECYSTECTOMY  2012    COLONOSCOPY N/A 4/6/2016    Procedure: COLONOSCOPY;  Surgeon: Luis Bogran-Reyes, MD;  Location[facility];  Service: Endoscopy;  Laterality: N/A;    COSMETIC SURGERY  1985    bilateral breast lift    HIP SURGERY  11/2014    right total    JOINT REPLACEMENT      TOTAL REDUCTION MAMMOPLASTY      TUBAL LIGATION         Family History   Problem Relation Age of Onset    Breast cancer Maternal Aunt 40    Breast cancer Cousin     Hypothyroidism Mother     Hypertension Father     Aortic aneurysm Father        Social History     Socioeconomic History    Marital status:      Spouse name: Not on file    Number of children: Not on file    Years of education: 10    Highest education level: Not on file   Occupational History    Not on file   Social Needs    Financial  resource strain: Not very hard    Food insecurity:     Worry: Never true     Inability: Never true    Transportation needs:     Medical: No     Non-medical: No   Tobacco Use    Smoking status: Former Smoker     Packs/day: 1.00     Years: 3.00     Pack years: 3.00    Smokeless tobacco: Never Used    Tobacco comment: less than 1ppd x 3 years   Substance and Sexual Activity    Alcohol use: Yes     Frequency: 2-4 times a month     Drinks per session: 1 or 2     Binge frequency: Never     Comment: occ    Drug use: No    Sexual activity: Yes     Partners: Male     Birth control/protection: See Surgical Hx   Lifestyle    Physical activity:     Days per week: 3 days     Minutes per session: 30 min    Stress: Not at all   Relationships    Social connections:     Talks on phone: More than three times a week     Gets together: More than three times a week     Attends Orthodoxy service: Not on file     Active member of club or organization: No     Attends meetings of clubs or organizations: Never     Relationship status:    Other Topics Concern    Not on file   Social History Narrative    Not on file       Allergies:  Erythromycin and Penicillins    Medications:    Current Outpatient Medications:     ibuprofen (ADVIL,MOTRIN) 600 MG tablet, Take 1 tablet (600 mg total) by mouth 2 (two) times daily as needed for Pain., Disp: 60 tablet, Rfl: 1    nitrofurantoin, macrocrystal-monohydrate, (MACROBID) 100 MG capsule, Take 1 capsule (100 mg total) by mouth 2 (two) times daily. for 5 days, Disp: 10 capsule, Rfl: 0    nitrofurantoin, macrocrystal-monohydrate, (MACROBID) 100 MG capsule, Take 1 capsule (100 mg total) by mouth 2 (two) times daily., Disp: 20 capsule, Rfl: 1    oxybutynin (DITROPAN-XL) 10 MG 24 hr tablet, Take 1 tablet (10 mg total) by mouth once daily., Disp: 30 tablet, Rfl: 11    Review of Systems   Constitutional: Negative for activity change, appetite change, chills, diaphoresis, fatigue, fever  and unexpected weight change.   HENT: Negative for congestion, dental problem, drooling, ear discharge, ear pain, facial swelling, hearing loss, mouth sores, nosebleeds, postnasal drip, rhinorrhea, sinus pressure, sinus pain, sneezing, sore throat, tinnitus, trouble swallowing and voice change.    Eyes: Negative for pain, discharge and itching.   Respiratory: Negative for apnea, cough, choking, chest tightness, shortness of breath and wheezing.    Cardiovascular: Negative for chest pain, palpitations and leg swelling.   Gastrointestinal: Negative for abdominal distention, abdominal pain, anal bleeding, blood in stool, constipation, diarrhea, nausea, rectal pain and vomiting.   Endocrine: Negative for polydipsia and polyuria.   Genitourinary: Positive for frequency and urgency. Negative for decreased urine volume, difficulty urinating, dyspareunia, dysuria, enuresis, flank pain, genital sores, hematuria and pelvic pain.   Musculoskeletal: Negative for arthralgias and back pain.   Skin: Negative for color change, rash and wound.   Neurological: Negative for dizziness, syncope, speech difficulty, light-headedness and headaches.   Hematological: Negative for adenopathy. Does not bruise/bleed easily.   Psychiatric/Behavioral: Negative for behavioral problems, confusion and sleep disturbance.       Objective:      Physical Exam   Constitutional: She appears well-developed.   HENT:   Head: Normocephalic.   Cardiovascular: Normal rate.    Pulmonary/Chest: Effort normal.   Abdominal: Soft.   Genitourinary:   Genitourinary Comments: Urine dip shows nitirites   Musculoskeletal: Normal range of motion.   Neurological: She is alert.   Skin: Skin is warm.           Urine Culture 10/12/19  Specimen Information: Urine, Clean Catch        Component 3d ago   Urine Culture, Routine Multiple organisms isolated. None in predominance.  Repeat if    Urine Culture, Routine clinically necessary.              Assessment:       1. Acute  cystitis without hematuria        Plan:       Diann was seen today for advice only.    Diagnoses and all orders for this visit:    Acute cystitis without hematuria  -     US Retroperitoneal Complete (Kidney and; Future  -     Urine culture; Future    Other orders  -     oxybutynin (DITROPAN-XL) 10 MG 24 hr tablet; Take 1 tablet (10 mg total) by mouth once daily.  -     nitrofurantoin, macrocrystal-monohydrate, (MACROBID) 100 MG capsule; Take 1 capsule (100 mg total) by mouth 2 (two) times daily.          Repeat Urine Culture  Schedule Renal US  Continue Azo Standard and finish round of Macrobid  Prescribe Macrobid and Ditropan  Instructed patient to see Gynecologist   RTC when patient returns after patient sees her Gynecologist    I, Carissa Tilley, am acting as a scribe on this patient encounter in the presence and under the supervision of Dr. Weathers.    10/15/2019 6:52 AM    I, Dr. Weathers, personally performed the services described in this documentation.   All medical record entries made by the scribe were at my direction and in my presence.   I have reviewed the chart and agree that the record is accurate and complete.   Jean Weathers MD.  6:52 AM 10/15/2019

## 2019-10-15 NOTE — TELEPHONE ENCOUNTER
SPoke to pt about Urology visit. Also spoke about the US with endometrial thickening. Pt is calling her GYN this AM and will let me know how she is doing or if she has any trouble.

## 2019-10-16 ENCOUNTER — HOSPITAL ENCOUNTER (OUTPATIENT)
Dept: RADIOLOGY | Facility: HOSPITAL | Age: 64
Discharge: HOME | End: 2019-10-16
Attending: UROLOGY
Payer: MEDICARE

## 2019-10-16 ENCOUNTER — TELEPHONE (OUTPATIENT)
Dept: INTERNAL MEDICINE | Facility: CLINIC | Age: 64
End: 2019-10-16

## 2019-10-16 DIAGNOSIS — N30.00 ACUTE CYSTITIS WITHOUT HEMATURIA: ICD-10-CM

## 2019-10-16 LAB
BACTERIA UR CULT: NORMAL
BACTERIA UR CULT: NORMAL

## 2019-10-16 PROCEDURE — 76770 US RETROPERITONEAL COMPLETE: ICD-10-PCS | Mod: 26,,, | Performed by: RADIOLOGY

## 2019-10-16 PROCEDURE — 76770 US EXAM ABDO BACK WALL COMP: CPT | Mod: 26,,, | Performed by: RADIOLOGY

## 2019-10-16 PROCEDURE — 76770 US EXAM ABDO BACK WALL COMP: CPT | Mod: TC

## 2019-10-16 NOTE — TELEPHONE ENCOUNTER
----- Message from Viviane Min sent at 10/16/2019  9:01 AM CDT -----  Contact: [phone]  Patient is requesting a call from the office regarding scheduling a GYN appt.  Patient was told by the doctor she should make an appt with a GYN.     Patient's GYN has retired and she is looking for recommendation from the doctor.    Please advise, thank you.

## 2019-11-07 ENCOUNTER — PATIENT OUTREACH (OUTPATIENT)
Dept: ADMINISTRATIVE | Facility: OTHER | Age: 64
End: 2019-11-07

## 2019-11-11 ENCOUNTER — OFFICE VISIT (OUTPATIENT)
Dept: OPTOMETRY | Facility: CLINIC | Age: 64
End: 2019-11-11
Payer: MEDICARE

## 2019-11-11 ENCOUNTER — OFFICE VISIT (OUTPATIENT)
Dept: OPTOMETRY | Facility: CLINIC | Age: 64
End: 2019-11-11

## 2019-11-11 DIAGNOSIS — H52.4 PRESBYOPIA: ICD-10-CM

## 2019-11-11 DIAGNOSIS — Z04.9 DISEASE RULED OUT AFTER EXAMINATION: ICD-10-CM

## 2019-11-11 DIAGNOSIS — H52.223 REGULAR ASTIGMATISM OF BOTH EYES: ICD-10-CM

## 2019-11-11 DIAGNOSIS — H25.13 NS (NUCLEAR SCLEROSIS), BILATERAL: Primary | ICD-10-CM

## 2019-11-11 DIAGNOSIS — Z46.0 FITTING AND ADJUSTMENT OF SPECTACLES AND CONTACT LENSES: Primary | ICD-10-CM

## 2019-11-11 PROCEDURE — 92310 CONTACT LENS FITTING OU: CPT | Mod: CSM,,, | Performed by: OPTOMETRIST

## 2019-11-11 PROCEDURE — 92015 DETERMINE REFRACTIVE STATE: CPT | Mod: S$GLB,,, | Performed by: OPTOMETRIST

## 2019-11-11 PROCEDURE — 99999 PR PBB SHADOW E&M-EST. PATIENT-LVL II: CPT | Mod: PBBFAC,,, | Performed by: OPTOMETRIST

## 2019-11-11 PROCEDURE — 99999 PR PBB SHADOW E&M-EST. PATIENT-LVL I: ICD-10-PCS | Mod: PBBFAC,,, | Performed by: OPTOMETRIST

## 2019-11-11 PROCEDURE — 92004 PR EYE EXAM, NEW PATIENT,COMPREHESV: ICD-10-PCS | Mod: S$GLB,,, | Performed by: OPTOMETRIST

## 2019-11-11 PROCEDURE — 99999 PR PBB SHADOW E&M-EST. PATIENT-LVL II: ICD-10-PCS | Mod: PBBFAC,,, | Performed by: OPTOMETRIST

## 2019-11-11 PROCEDURE — 92015 PR REFRACTION: ICD-10-PCS | Mod: S$GLB,,, | Performed by: OPTOMETRIST

## 2019-11-11 PROCEDURE — 99999 PR PBB SHADOW E&M-EST. PATIENT-LVL I: CPT | Mod: PBBFAC,,, | Performed by: OPTOMETRIST

## 2019-11-11 PROCEDURE — 92310 PR CONTACT LENS FITTING (NO CHANGE): ICD-10-PCS | Mod: CSM,,, | Performed by: OPTOMETRIST

## 2019-11-11 PROCEDURE — 92004 COMPRE OPH EXAM NEW PT 1/>: CPT | Mod: S$GLB,,, | Performed by: OPTOMETRIST

## 2019-11-11 NOTE — PROGRESS NOTES
HPI     Pt here for routine  Has not had an eye exam since in her 30s  Pt states she experiences flashes of light in OD on occasion seldomly and   floaters all the time  Itching sometimes   Patient denies diplopia, headaches,  pain, and itching/burning/tearing.    Pt does not use any eye drops.  Pt has been using +1.25 otc glasses for distance and puts on +3.00 over it   to see up close  Pt wants to try cls for first time today    Last edited by Masha Santiago on 11/11/2019  9:50 AM. (History)            Assessment /Plan     For exam results, see Encounter Report.    NS (nuclear sclerosis), bilateral  Disease ruled out after examination   Good overall ocular health, monitor yearly    Presbyopia  Regular astigmatism of both eyes   Rx specs, discussed PAL   CL fit today: dispensed trials of BL ultra monovision OD near, OS distance   Remove nightly, replace daily   Also ordered trials of MYday toric for pt to try   Ok to order if preferred    RTC 1 year, sooner PRN

## 2019-11-13 ENCOUNTER — PATIENT OUTREACH (OUTPATIENT)
Dept: ADMINISTRATIVE | Facility: OTHER | Age: 64
End: 2019-11-13

## 2019-11-13 ENCOUNTER — PATIENT MESSAGE (OUTPATIENT)
Dept: OPTOMETRY | Facility: CLINIC | Age: 64
End: 2019-11-13

## 2019-11-14 ENCOUNTER — OFFICE VISIT (OUTPATIENT)
Dept: OPTOMETRY | Facility: CLINIC | Age: 64
End: 2019-11-14
Payer: MEDICARE

## 2019-11-14 DIAGNOSIS — H52.223 REGULAR ASTIGMATISM OF BOTH EYES: Primary | ICD-10-CM

## 2019-11-14 PROCEDURE — 99499 UNLISTED E&M SERVICE: CPT | Mod: S$GLB,,, | Performed by: OPTOMETRIST

## 2019-11-14 PROCEDURE — 99499 NO LOS: ICD-10-PCS | Mod: S$GLB,,, | Performed by: OPTOMETRIST

## 2019-11-14 NOTE — PROGRESS NOTES
HPI     Irritated OS, thinks lens is stuck in eye    Last edited by Leticia Lino, OD on 11/14/2019 12:49 PM. (History)            Assessment /Plan     For exam results, see Encounter Report.    Regular astigmatism of both eyes      Dispensed trials of BL ultra for astig, pt prefers distance only   Order trials of dailies for pt to try  OK to order whichever is preferred    Use refresh or systane BID /PRN

## 2019-11-18 ENCOUNTER — OFFICE VISIT (OUTPATIENT)
Dept: OBSTETRICS AND GYNECOLOGY | Facility: CLINIC | Age: 64
End: 2019-11-18
Attending: OBSTETRICS & GYNECOLOGY
Payer: MEDICARE

## 2019-11-18 VITALS
SYSTOLIC BLOOD PRESSURE: 140 MMHG | BODY MASS INDEX: 25.55 KG/M2 | WEIGHT: 138.88 LBS | DIASTOLIC BLOOD PRESSURE: 70 MMHG | HEIGHT: 62 IN

## 2019-11-18 DIAGNOSIS — R93.5 ABNORMAL ENDOMETRIAL ULTRASOUND: Primary | ICD-10-CM

## 2019-11-18 PROCEDURE — 88305 TISSUE EXAM BY PATHOLOGIST: CPT | Performed by: PATHOLOGY

## 2019-11-18 PROCEDURE — 3008F BODY MASS INDEX DOCD: CPT | Mod: CPTII,S$GLB,, | Performed by: OBSTETRICS & GYNECOLOGY

## 2019-11-18 PROCEDURE — 99204 OFFICE O/P NEW MOD 45 MIN: CPT | Mod: 25,S$GLB,, | Performed by: OBSTETRICS & GYNECOLOGY

## 2019-11-18 PROCEDURE — 58100 BIOPSY OF UTERUS LINING: CPT | Mod: S$GLB,,, | Performed by: OBSTETRICS & GYNECOLOGY

## 2019-11-18 PROCEDURE — 1101F PR PT FALLS ASSESS DOC 0-1 FALLS W/OUT INJ PAST YR: ICD-10-PCS | Mod: CPTII,S$GLB,, | Performed by: OBSTETRICS & GYNECOLOGY

## 2019-11-18 PROCEDURE — 88305 TISSUE EXAM BY PATHOLOGIST: CPT | Mod: 26,,, | Performed by: PATHOLOGY

## 2019-11-18 PROCEDURE — 3008F PR BODY MASS INDEX (BMI) DOCUMENTED: ICD-10-PCS | Mod: CPTII,S$GLB,, | Performed by: OBSTETRICS & GYNECOLOGY

## 2019-11-18 PROCEDURE — 99204 PR OFFICE/OUTPT VISIT, NEW, LEVL IV, 45-59 MIN: ICD-10-PCS | Mod: 25,S$GLB,, | Performed by: OBSTETRICS & GYNECOLOGY

## 2019-11-18 PROCEDURE — 1101F PT FALLS ASSESS-DOCD LE1/YR: CPT | Mod: CPTII,S$GLB,, | Performed by: OBSTETRICS & GYNECOLOGY

## 2019-11-18 PROCEDURE — 58100 PR BIOPSY OF UTERUS LINING: ICD-10-PCS | Mod: S$GLB,,, | Performed by: OBSTETRICS & GYNECOLOGY

## 2019-11-18 PROCEDURE — 88305 TISSUE EXAM BY PATHOLOGIST: ICD-10-PCS | Mod: 26,,, | Performed by: PATHOLOGY

## 2019-11-18 PROCEDURE — 99999 PR PBB SHADOW E&M-EST. PATIENT-LVL III: ICD-10-PCS | Mod: PBBFAC,,, | Performed by: OBSTETRICS & GYNECOLOGY

## 2019-11-18 PROCEDURE — 99999 PR PBB SHADOW E&M-EST. PATIENT-LVL III: CPT | Mod: PBBFAC,,, | Performed by: OBSTETRICS & GYNECOLOGY

## 2019-11-19 PROBLEM — Z01.419 WELL WOMAN EXAM: Status: RESOLVED | Noted: 2017-08-22 | Resolved: 2019-11-19

## 2019-11-19 NOTE — PROGRESS NOTES
SUBJECTIVE:   64 y.o. female   for abnormal pelvic ultrasound. No LMP recorded. Patient is postmenopausal..  Had episode of urinary frequency and sx of UTI so went to .  Was given ABX for presumed UTI.  Pelvic ultrasound ordered for further evaluation of sx.  Incidental finding of abnormal endometrium- 10 mm.  Denies PMB.  No HRT.  Has seen urology for follow up and sx resolved.         Past Medical History:   Diagnosis Date    Arthritis     Right hip pain      Past Surgical History:   Procedure Laterality Date    BREAST SURGERY Bilateral     breast lift    CHOLECYSTECTOMY      COLONOSCOPY N/A 2016    Procedure: COLONOSCOPY;  Surgeon: Luis Bogran-Reyes, MD;  Location: [facility];  Service: Endoscopy;  Laterality: N/A;    COSMETIC SURGERY      bilateral breast lift    HIP SURGERY  2014    right total    JOINT REPLACEMENT      TOTAL REDUCTION MAMMOPLASTY      TUBAL LIGATION       Social History     Socioeconomic History    Marital status:      Spouse name: Not on file    Number of children: Not on file    Years of education: 10    Highest education level: Not on file   Occupational History    Not on file   Social Needs    Financial resource strain: Not very hard    Food insecurity:     Worry: Never true     Inability: Never true    Transportation needs:     Medical: No     Non-medical: No   Tobacco Use    Smoking status: Former Smoker     Packs/day: 1.00     Years: 3.00     Pack years: 3.00    Smokeless tobacco: Former User    Tobacco comment: less than 1ppd x 3 years   Substance and Sexual Activity    Alcohol use: Yes     Frequency: 2-4 times a month     Drinks per session: 1 or 2     Binge frequency: Never     Comment: occ    Drug use: No    Sexual activity: Yes     Partners: Male     Birth control/protection: See Surgical Hx   Lifestyle    Physical activity:     Days per week: 3 days     Minutes per session: 30 min    Stress: Not at all   Relationships     Social connections:     Talks on phone: More than three times a week     Gets together: More than three times a week     Attends Oriental orthodox service: Not on file     Active member of club or organization: No     Attends meetings of clubs or organizations: Never     Relationship status:    Other Topics Concern    Not on file   Social History Narrative    Not on file     Family History   Problem Relation Age of Onset    Breast cancer Maternal Aunt 40    Breast cancer Cousin     Hypothyroidism Mother     Hypertension Father     Aortic aneurysm Father      OB History    Para Term  AB Living   6 6 6     6   SAB TAB Ectopic Multiple Live Births           6      # Outcome Date GA Lbr Colin/2nd Weight Sex Delivery Anes PTL Lv   6 Term            5 Term            4 Term            3 Term            2 Term            1 Term                  Current Outpatient Medications   Medication Sig Dispense Refill    ibuprofen (ADVIL,MOTRIN) 600 MG tablet Take 1 tablet (600 mg total) by mouth 2 (two) times daily as needed for Pain. 60 tablet 1    nitrofurantoin, macrocrystal-monohydrate, (MACROBID) 100 MG capsule Take 1 capsule (100 mg total) by mouth 2 (two) times daily. 20 capsule 1    oxybutynin (DITROPAN-XL) 10 MG 24 hr tablet Take 1 tablet (10 mg total) by mouth once daily. 30 tablet 11     No current facility-administered medications for this visit.      Allergies: Erythromycin and Penicillins     ROS:  Constitutional: no weight loss, weight gain, fever, fatigue  Eyes:  No vision changes, glasses/contacts  ENT/Mouth: No ulcers, sinus problems, ears ringing, headache  Cardiovascular: No inability to lie flat, chest pain, exercise intolerance, swelling, heart palpitations  Respiratory: No wheezing, coughing blood, shortness of breath, or cough  Gastrointestinal: No diarrhea, bloody stool, nausea/vomiting, constipation, gas, hemorrhoids  Genitourinary: No blood in urine, painful urination, urgency of  "urination, frequency of urination, incomplete emptying, incontinence, abnormal bleeding, painful periods, heavy periods, vaginal discharge, vaginal odor, painful intercourse, sexual problems, bleeding after intercourse.  Musculoskeletal: No muscle weakness  Skin/Breast: No painful breasts, nipple discharge, masses, rash, ulcers  Neurological: No passing out, seizures, numbness, headache  Endocrine: No diabetes, hypothyroid, hyperthyroid, hot flashes, hair loss, abnormal hair growth, ance  Psychiatric: No depression, crying  Hematologic: No bruises, bleeding, swollen lymph nodes, anemia.      OBJECTIVE:   The patient appears well, alert, oriented x 3, in no distress.  BP (!) 140/70 (BP Location: Right arm, Patient Position: Sitting, BP Method: Medium (Manual))   Ht 5' 2" (1.575 m)   Wt 63 kg (138 lb 14.2 oz)   BMI 25.40 kg/m²   ABDOMEN: no hernias, masses, or hepatosplenomegaly  GENITALIA: normal external genitalia, no erythema, no discharge  URETHRA: normal urethra, normal urethral meatus  VAGINA: Normal  CERVIX: no lesions or cervical motion tenderness  UTERUS: normal size, contour, position, consistency, mobility, non-tender  ADNEXA: no mass, fullness, tenderness    Endometrial biopsy.  Consent was obtained.  Speculum inserted. Betadine swab on the cervix.  Allis used on anterior lip of cervix.  Pipette inserted to 7 cm.  2 passes.  Adequate- scant tissue.  Instruments removed.  Tolerated well.    ASSESSMENT:   1. Abnormal endometrial ultrasound  Specimen to Pathology, Ob/Gyn    Endometrial biopsy       PLAN:   Orders Placed This Encounter    Endometrial biopsy    Specimen to Pathology, Ob/Gyn     Discussed abnormal endometrium on ultrasound without any PMB or other gyn sx such as pain.  I recommend biopsy.  Consents to office Embx.  Done today- suspect insufficient.  Will then consider D&C in OR vs following.  Return to clinic for results.  "

## 2019-11-20 ENCOUNTER — TELEPHONE (OUTPATIENT)
Dept: OPTOMETRY | Facility: CLINIC | Age: 64
End: 2019-11-20

## 2019-11-20 ENCOUNTER — PATIENT MESSAGE (OUTPATIENT)
Dept: OPTOMETRY | Facility: CLINIC | Age: 64
End: 2019-11-20

## 2019-11-20 NOTE — TELEPHONE ENCOUNTER
----- Message from Moody Keys sent at 11/20/2019  1:58 PM CST -----  Contact: pt  Pt calling in regards to cancelling a order    Call back: [phone]

## 2019-11-26 ENCOUNTER — PATIENT MESSAGE (OUTPATIENT)
Dept: OBSTETRICS AND GYNECOLOGY | Facility: CLINIC | Age: 64
End: 2019-11-26

## 2019-12-26 LAB
FINAL PATHOLOGIC DIAGNOSIS: NORMAL
GROSS: NORMAL

## 2019-12-27 ENCOUNTER — TELEPHONE (OUTPATIENT)
Dept: OBSTETRICS AND GYNECOLOGY | Facility: CLINIC | Age: 64
End: 2019-12-27

## 2019-12-27 NOTE — TELEPHONE ENCOUNTER
Spoke with pt. Results given. Pt voiced understanding of results. Pt states she has not had any more bleeding. Advised of needed appt if bleeding to discuss surgical option with . Pt voiced understanding

## 2019-12-27 NOTE — TELEPHONE ENCOUNTER
----- Message from Ginger Melo MD sent at 12/27/2019  1:27 PM CST -----  Endometrial biopsy was negative for malignancy.  If any further bleeding, I will recommend a D&C hysteroscopy under anesthesia in the OR

## 2020-11-03 ENCOUNTER — PATIENT MESSAGE (OUTPATIENT)
Dept: INTERNAL MEDICINE | Facility: CLINIC | Age: 65
End: 2020-11-03

## 2020-11-03 ENCOUNTER — HOSPITAL ENCOUNTER (OUTPATIENT)
Dept: RADIOLOGY | Facility: HOSPITAL | Age: 65
Discharge: HOME | End: 2020-11-03
Attending: INTERNAL MEDICINE
Payer: MEDICARE

## 2020-11-03 ENCOUNTER — OFFICE VISIT (OUTPATIENT)
Dept: INTERNAL MEDICINE | Facility: CLINIC | Age: 65
End: 2020-11-03
Payer: MEDICARE

## 2020-11-03 VITALS
HEIGHT: 62 IN | HEART RATE: 69 BPM | OXYGEN SATURATION: 97 % | DIASTOLIC BLOOD PRESSURE: 78 MMHG | WEIGHT: 145.75 LBS | BODY MASS INDEX: 26.82 KG/M2 | SYSTOLIC BLOOD PRESSURE: 132 MMHG

## 2020-11-03 DIAGNOSIS — M79.641 RIGHT HAND PAIN: ICD-10-CM

## 2020-11-03 DIAGNOSIS — M25.531 RIGHT WRIST PAIN: Primary | ICD-10-CM

## 2020-11-03 DIAGNOSIS — S63.501A SPRAIN OF RIGHT WRIST, INITIAL ENCOUNTER: ICD-10-CM

## 2020-11-03 DIAGNOSIS — S69.91XA INJURY OF RIGHT HAND, INITIAL ENCOUNTER: ICD-10-CM

## 2020-11-03 DIAGNOSIS — M25.531 RIGHT WRIST PAIN: ICD-10-CM

## 2020-11-03 PROCEDURE — 3008F BODY MASS INDEX DOCD: CPT | Mod: CPTII,S$GLB,, | Performed by: INTERNAL MEDICINE

## 2020-11-03 PROCEDURE — 99213 OFFICE O/P EST LOW 20 MIN: CPT | Mod: S$GLB,,, | Performed by: INTERNAL MEDICINE

## 2020-11-03 PROCEDURE — 1125F PR PAIN SEVERITY QUANTIFIED, PAIN PRESENT: ICD-10-PCS | Mod: S$GLB,,, | Performed by: INTERNAL MEDICINE

## 2020-11-03 PROCEDURE — 73110 X-RAY EXAM OF WRIST: CPT | Mod: TC,RT

## 2020-11-03 PROCEDURE — 1159F PR MEDICATION LIST DOCUMENTED IN MEDICAL RECORD: ICD-10-PCS | Mod: S$GLB,,, | Performed by: INTERNAL MEDICINE

## 2020-11-03 PROCEDURE — 1100F PTFALLS ASSESS-DOCD GE2>/YR: CPT | Mod: CPTII,S$GLB,, | Performed by: INTERNAL MEDICINE

## 2020-11-03 PROCEDURE — 99999 PR PBB SHADOW E&M-EST. PATIENT-LVL III: ICD-10-PCS | Mod: PBBFAC,,, | Performed by: INTERNAL MEDICINE

## 2020-11-03 PROCEDURE — 73110 X-RAY EXAM OF WRIST: CPT | Mod: 26,RT,, | Performed by: RADIOLOGY

## 2020-11-03 PROCEDURE — 73130 X-RAY EXAM OF HAND: CPT | Mod: 26,RT,, | Performed by: RADIOLOGY

## 2020-11-03 PROCEDURE — 3008F PR BODY MASS INDEX (BMI) DOCUMENTED: ICD-10-PCS | Mod: CPTII,S$GLB,, | Performed by: INTERNAL MEDICINE

## 2020-11-03 PROCEDURE — 73130 XR HAND COMPLETE 3 VIEW RIGHT: ICD-10-PCS | Mod: 26,RT,, | Performed by: RADIOLOGY

## 2020-11-03 PROCEDURE — 1100F PR PT FALLS ASSESS DOC 2+ FALLS/FALL W/INJURY/YR: ICD-10-PCS | Mod: CPTII,S$GLB,, | Performed by: INTERNAL MEDICINE

## 2020-11-03 PROCEDURE — 3288F FALL RISK ASSESSMENT DOCD: CPT | Mod: CPTII,S$GLB,, | Performed by: INTERNAL MEDICINE

## 2020-11-03 PROCEDURE — 99999 PR PBB SHADOW E&M-EST. PATIENT-LVL III: CPT | Mod: PBBFAC,,, | Performed by: INTERNAL MEDICINE

## 2020-11-03 PROCEDURE — 73130 X-RAY EXAM OF HAND: CPT | Mod: TC,RT

## 2020-11-03 PROCEDURE — 1159F MED LIST DOCD IN RCRD: CPT | Mod: S$GLB,,, | Performed by: INTERNAL MEDICINE

## 2020-11-03 PROCEDURE — 99213 PR OFFICE/OUTPT VISIT, EST, LEVL III, 20-29 MIN: ICD-10-PCS | Mod: S$GLB,,, | Performed by: INTERNAL MEDICINE

## 2020-11-03 PROCEDURE — 73110 XR WRIST COMPLETE 3 VIEWS RIGHT: ICD-10-PCS | Mod: 26,RT,, | Performed by: RADIOLOGY

## 2020-11-03 PROCEDURE — 1125F AMNT PAIN NOTED PAIN PRSNT: CPT | Mod: S$GLB,,, | Performed by: INTERNAL MEDICINE

## 2020-11-03 PROCEDURE — 3288F PR FALLS RISK ASSESSMENT DOCUMENTED: ICD-10-PCS | Mod: CPTII,S$GLB,, | Performed by: INTERNAL MEDICINE

## 2020-11-03 NOTE — PROGRESS NOTES
Subjective:       Patient ID: Diann Wasserman is a 65 y.o. female.    Chief Complaint: Edema    HPI: Pt fell 2 times in the last few months, both times breaking fall with right hand.   It hurts dorsum of hand near wrist. Tried cold water with some relief.   Has not taken any preventive meds or treatment.   Tried a brace originally. Felt better to stopped after first fall then started up again after second fall.     She is right handed.         Review of Systems   Constitutional: Negative for activity change and unexpected weight change.   HENT: Negative for hearing loss, rhinorrhea and trouble swallowing.    Eyes: Negative for discharge and visual disturbance.   Respiratory: Negative for chest tightness and wheezing.    Cardiovascular: Negative for chest pain and palpitations.   Gastrointestinal: Negative for blood in stool, constipation, diarrhea and vomiting.   Endocrine: Negative for polydipsia and polyuria.   Genitourinary: Negative for difficulty urinating, dysuria, hematuria and menstrual problem.   Musculoskeletal: Positive for arthralgias (right wrist). Negative for joint swelling and neck pain.   Neurological: Negative for weakness and headaches.   Psychiatric/Behavioral: Negative for confusion and dysphoric mood.         Objective:      Physical Exam  Constitutional:       Appearance: Normal appearance.   Cardiovascular:      Pulses: Normal pulses.   Musculoskeletal:         General: Swelling (dorsum right hand near wrist) and tenderness present. No deformity.        Hands:       Right lower leg: No edema.      Left lower leg: No edema.      Comments: Minimal tenderness to palpation but more tenderness with flexion and extension   Neurological:      Mental Status: She is alert.      Sensory: No sensory deficit.         Assessment:       1. Right wrist pain    2. Right hand pain    3. Injury of right hand, initial encounter        Plan:       Diann was seen today for edema.    Diagnoses and all  orders for this visit:    Right wrist pain  -     X-Ray Wrist 2 View Right; Future  -     X-Ray Hand 3 view Right; Future    Right hand pain  -     X-Ray Wrist 2 View Right; Future  -     X-Ray Hand 3 view Right; Future    Injury of right hand, initial encounter  -     X-Ray Wrist 2 View Right; Future  -     X-Ray Hand 3 view Right; Future        Review xrays, use ACE wrap, ice and Advil or Aleve.   See Ortho if not resolving.

## 2020-11-05 ENCOUNTER — PATIENT OUTREACH (OUTPATIENT)
Dept: ADMINISTRATIVE | Facility: OTHER | Age: 65
End: 2020-11-05

## 2020-11-05 DIAGNOSIS — Z12.31 BREAST CANCER SCREENING BY MAMMOGRAM: Primary | ICD-10-CM

## 2020-11-06 ENCOUNTER — OFFICE VISIT (OUTPATIENT)
Dept: ORTHOPEDICS | Facility: CLINIC | Age: 65
End: 2020-11-06
Payer: MEDICARE

## 2020-11-06 VITALS
WEIGHT: 145 LBS | BODY MASS INDEX: 26.68 KG/M2 | HEART RATE: 66 BPM | HEIGHT: 62 IN | DIASTOLIC BLOOD PRESSURE: 77 MMHG | SYSTOLIC BLOOD PRESSURE: 136 MMHG

## 2020-11-06 DIAGNOSIS — M25.531 RIGHT WRIST PAIN: ICD-10-CM

## 2020-11-06 PROCEDURE — 99204 PR OFFICE/OUTPT VISIT, NEW, LEVL IV, 45-59 MIN: ICD-10-PCS | Mod: HCNC,S$GLB,, | Performed by: PHYSICIAN ASSISTANT

## 2020-11-06 PROCEDURE — 1159F PR MEDICATION LIST DOCUMENTED IN MEDICAL RECORD: ICD-10-PCS | Mod: HCNC,S$GLB,, | Performed by: PHYSICIAN ASSISTANT

## 2020-11-06 PROCEDURE — 1159F MED LIST DOCD IN RCRD: CPT | Mod: HCNC,S$GLB,, | Performed by: PHYSICIAN ASSISTANT

## 2020-11-06 PROCEDURE — 1100F PTFALLS ASSESS-DOCD GE2>/YR: CPT | Mod: HCNC,CPTII,S$GLB, | Performed by: PHYSICIAN ASSISTANT

## 2020-11-06 PROCEDURE — 1125F PR PAIN SEVERITY QUANTIFIED, PAIN PRESENT: ICD-10-PCS | Mod: HCNC,S$GLB,, | Performed by: PHYSICIAN ASSISTANT

## 2020-11-06 PROCEDURE — 3008F PR BODY MASS INDEX (BMI) DOCUMENTED: ICD-10-PCS | Mod: HCNC,CPTII,S$GLB, | Performed by: PHYSICIAN ASSISTANT

## 2020-11-06 PROCEDURE — 99999 PR PBB SHADOW E&M-EST. PATIENT-LVL III: ICD-10-PCS | Mod: PBBFAC,HCNC,, | Performed by: PHYSICIAN ASSISTANT

## 2020-11-06 PROCEDURE — 1125F AMNT PAIN NOTED PAIN PRSNT: CPT | Mod: HCNC,S$GLB,, | Performed by: PHYSICIAN ASSISTANT

## 2020-11-06 PROCEDURE — 3008F BODY MASS INDEX DOCD: CPT | Mod: HCNC,CPTII,S$GLB, | Performed by: PHYSICIAN ASSISTANT

## 2020-11-06 PROCEDURE — 99999 PR PBB SHADOW E&M-EST. PATIENT-LVL III: CPT | Mod: PBBFAC,HCNC,, | Performed by: PHYSICIAN ASSISTANT

## 2020-11-06 PROCEDURE — 99204 OFFICE O/P NEW MOD 45 MIN: CPT | Mod: HCNC,S$GLB,, | Performed by: PHYSICIAN ASSISTANT

## 2020-11-06 PROCEDURE — 3288F FALL RISK ASSESSMENT DOCD: CPT | Mod: HCNC,CPTII,S$GLB, | Performed by: PHYSICIAN ASSISTANT

## 2020-11-06 PROCEDURE — 3288F PR FALLS RISK ASSESSMENT DOCUMENTED: ICD-10-PCS | Mod: HCNC,CPTII,S$GLB, | Performed by: PHYSICIAN ASSISTANT

## 2020-11-06 PROCEDURE — 1100F PR PT FALLS ASSESS DOC 2+ FALLS/FALL W/INJURY/YR: ICD-10-PCS | Mod: HCNC,CPTII,S$GLB, | Performed by: PHYSICIAN ASSISTANT

## 2020-11-06 NOTE — LETTER
November 6, 2020      Wes Garcias MD  [address]  [facility] [address]           [place] [address]  [address], [address]  [facility] [address]  Phone: [phone]          Patient: Diann Wasserman   MR Number: [id number]   YOB: 1955   Date of Visit: 11/6/2020       Dear Dr. Wes Garcias:    Thank you for referring Diann Wasserman to me for evaluation. Attached you will find relevant portions of my assessment and plan of care.    If you have questions, please do not hesitate to call me. I look forward to following Diann Wasserman along with you.    Sincerely,    Shameka Ruano PA-C    Enclosure  CC:  No Recipients    If you would like to receive this communication electronically, please contact [email or web address] or [phone] to request more information on [employer] Link access.    For providers and/or their staff who would like to refer a patient to [facility], please contact us through our one-stop-shop provider referral line, [facility], at [phone].    If you feel you have received this communication in error or would no longer like to receive these types of communications, please e-mail [email or web address]

## 2020-11-06 NOTE — PROGRESS NOTES
Health Maintenance Due   Topic Date Due    TETANUS VACCINE  09/02/1972    DEXA SCAN  09/02/1994    Shingles Vaccine (1 of 2) 09/02/2004    Pneumococcal Vaccine (65+ Low/Medium Risk) (1 of 2 - PCV13) 09/02/2019    Influenza Vaccine (1) 08/01/2020    Pap Smear  08/22/2020    Mammogram  09/24/2020     Updates were requested from care everywhere.  Chart was reviewed for overdue Proactive [facility]ters ([facility]) topics (CRS, Breast Cancer Screening, Eye exam)  Health Maintenance has been updated.  [facility] immunization registry triggered.  Immunizations were reconciled.  Mammo w/CAD ordered  Unable to update profile in LINKS

## 2020-11-06 NOTE — PROGRESS NOTES
Subjective:      Patient ID: Diann Wasserman is a 65 y.o. female.    Chief Complaint: Pain of the Right Wrist      HPI  Diann Wasserman is a right hand dominant 65 y.o. female presenting today for evaluation of the right wrist. She states initial injury was 2 years ago, she had a fall onto the wrist. She did not ever seek medical attention for this but reports pain has persisted since this time. Pt states about 6-9 months ago, she had another fall onto the right wrist at which time her chronic wrist pain increased. Pain is located over the radial dorsal wrist, over the snuffbox. Pt was recently seen by her PCP who obtained an xray of the wrist which revealed a possible scaphoid fracture. She states she has previously worn a brace for about one week but otherwise has avoided using the wrist much secondary to pain. Denies numbness.       Review of patient's allergies indicates:   Allergen Reactions    Erythromycin Rash    Penicillins Rash and Other (See Comments)     Redness         Current Outpatient Medications   Medication Sig Dispense Refill    ibuprofen (ADVIL,MOTRIN) 600 MG tablet Take 1 tablet (600 mg total) by mouth 2 (two) times daily as needed for Pain. 60 tablet 1    nitrofurantoin, macrocrystal-monohydrate, (MACROBID) 100 MG capsule Take 1 capsule (100 mg total) by mouth 2 (two) times daily. (Patient not taking: Reported on 11/3/2020) 20 capsule 1    oxybutynin (DITROPAN-XL) 10 MG 24 hr tablet Take 1 tablet (10 mg total) by mouth once daily. (Patient not taking: Reported on 11/3/2020) 30 tablet 11     No current facility-administered medications for this visit.        Past Medical History:   Diagnosis Date    Arthritis     Right hip pain        Past Surgical History:   Procedure Laterality Date    BREAST SURGERY Bilateral     breast lift    CHOLECYSTECTOMY  2012    COLONOSCOPY N/A 4/6/2016    Procedure: COLONOSCOPY;  Surgeon: Luis Bogran-Reyes, MD;  Location: [facility];  Service:  "Endoscopy;  Laterality: N/A;    COSMETIC SURGERY  1985    bilateral breast lift    HIP SURGERY  11/2014    right total    JOINT REPLACEMENT      TOTAL REDUCTION MAMMOPLASTY      TUBAL LIGATION         Review of Systems:  Constitutional: Negative for chills and fever.   Respiratory: Negative for cough and shortness of breath.    Gastrointestinal: Negative for nausea and vomiting.   Skin: Negative for rash.   Neurological: Negative for dizziness and headaches.   Psychiatric/Behavioral: Negative for depression.   MSK as in HPI       OBJECTIVE:     PHYSICAL EXAM:  /77 (BP Location: Left arm, Patient Position: Sitting, BP Method: Large (Automatic))   Pulse 66   Ht 5' 2" (1.575 m)   Wt 65.8 kg (145 lb)   BMI 26.52 kg/m²     GEN:  NAD, well-developed, well-groomed.  NEURO: Awake, alert, and oriented. Normal attention and concentration.    PSYCH: Normal mood and affect. Behavior is normal.  HEENT: No cervical lymphadenopathy noted.  CARDIOVASCULAR: Radial pulses 2+ bilaterally. No LE edema noted.  PULMONARY: Breath sounds normal. No respiratory distress.  SKIN: Intact, no rashes.      MSK:   RUE:  Good active ROM of the wrist and fingers. Pain with wrist flexion. ttp at the snuffbox. There is a small area of edema present in the area. AIN/PIN/Radial/Median/Ulnar Nerves assessed in isolation without deficit. Radial & Ulnar arteries palpated 2+. Capillary Refill <3s.    RADIOGRAPHS:  Xray right wrist 11/3/20  Impression:  Subtle lucency through the distal scaphoid on one view only for which a subtle fracture is not excluded.  Please correlate for point tenderness.     Widening of the scapholunate distance concerning for underlying ligamentous injury.    Xray right hand 11/3/20  Impression:  No acute osseous abnormality identified.  Comments: I have personally reviewed the imaging and I agree with the above radiologist's report.    ASSESSMENT/PLAN:       ICD-10-CM ICD-9-CM   1. Right wrist pain  M25.531 719.43 "   2. Sprain of right wrist, initial encounter  S63.501A 842.00      Plan:   Plan for CT for further evaluation of the right scaphoid, r/o scaphoid fracture/ nonunion   Thumb spica brace provided   RTC following CT      The patient indicates understanding of these issues and agrees to the plan.    Shameka Ruano PA-C  [facility]   [facility]  [place], [place]

## 2020-11-10 ENCOUNTER — HOSPITAL ENCOUNTER (OUTPATIENT)
Dept: RADIOLOGY | Facility: HOSPITAL | Age: 65
Discharge: HOME | End: 2020-11-10
Attending: PHYSICIAN ASSISTANT
Payer: MEDICARE

## 2020-11-10 DIAGNOSIS — M25.531 RIGHT WRIST PAIN: ICD-10-CM

## 2020-11-10 PROCEDURE — 73200 CT UPPER EXTREMITY W/O DYE: CPT | Mod: 26,HCNC,RT, | Performed by: RADIOLOGY

## 2020-11-10 PROCEDURE — 73200 CT UPPER EXTREMITY W/O DYE: CPT | Mod: TC,HCNC,RT

## 2020-11-10 PROCEDURE — 73200 CT WRIST WITHOUT CONTRAST RIGHT: ICD-10-PCS | Mod: 26,HCNC,RT, | Performed by: RADIOLOGY

## 2020-11-11 ENCOUNTER — TELEPHONE (OUTPATIENT)
Dept: ORTHOPEDICS | Facility: CLINIC | Age: 65
End: 2020-11-11

## 2020-11-11 NOTE — TELEPHONE ENCOUNTER
Left patient a voicemail reminder of their scheduled appointment on 11/12/20. If the patient has any questions we can be reached at [phone].

## 2020-11-12 ENCOUNTER — OFFICE VISIT (OUTPATIENT)
Dept: ORTHOPEDICS | Facility: CLINIC | Age: 65
End: 2020-11-12
Payer: MEDICARE

## 2020-11-12 VITALS
HEIGHT: 62 IN | SYSTOLIC BLOOD PRESSURE: 138 MMHG | BODY MASS INDEX: 26.69 KG/M2 | HEART RATE: 79 BPM | DIASTOLIC BLOOD PRESSURE: 91 MMHG | WEIGHT: 145.06 LBS

## 2020-11-12 DIAGNOSIS — M25.531 RIGHT WRIST PAIN: Primary | ICD-10-CM

## 2020-11-12 PROCEDURE — 3288F PR FALLS RISK ASSESSMENT DOCUMENTED: ICD-10-PCS | Mod: HCNC,CPTII,S$GLB, | Performed by: ORTHOPAEDIC SURGERY

## 2020-11-12 PROCEDURE — 3288F FALL RISK ASSESSMENT DOCD: CPT | Mod: HCNC,CPTII,S$GLB, | Performed by: ORTHOPAEDIC SURGERY

## 2020-11-12 PROCEDURE — 1159F MED LIST DOCD IN RCRD: CPT | Mod: HCNC,S$GLB,, | Performed by: ORTHOPAEDIC SURGERY

## 2020-11-12 PROCEDURE — 1159F PR MEDICATION LIST DOCUMENTED IN MEDICAL RECORD: ICD-10-PCS | Mod: HCNC,S$GLB,, | Performed by: ORTHOPAEDIC SURGERY

## 2020-11-12 PROCEDURE — 1101F PR PT FALLS ASSESS DOC 0-1 FALLS W/OUT INJ PAST YR: ICD-10-PCS | Mod: HCNC,CPTII,S$GLB, | Performed by: ORTHOPAEDIC SURGERY

## 2020-11-12 PROCEDURE — 3008F BODY MASS INDEX DOCD: CPT | Mod: HCNC,CPTII,S$GLB, | Performed by: ORTHOPAEDIC SURGERY

## 2020-11-12 PROCEDURE — 3008F PR BODY MASS INDEX (BMI) DOCUMENTED: ICD-10-PCS | Mod: HCNC,CPTII,S$GLB, | Performed by: ORTHOPAEDIC SURGERY

## 2020-11-12 PROCEDURE — 99999 PR PBB SHADOW E&M-EST. PATIENT-LVL III: ICD-10-PCS | Mod: PBBFAC,HCNC,, | Performed by: ORTHOPAEDIC SURGERY

## 2020-11-12 PROCEDURE — 99999 PR PBB SHADOW E&M-EST. PATIENT-LVL III: CPT | Mod: PBBFAC,HCNC,, | Performed by: ORTHOPAEDIC SURGERY

## 2020-11-12 PROCEDURE — 99214 OFFICE O/P EST MOD 30 MIN: CPT | Mod: HCNC,S$GLB,, | Performed by: ORTHOPAEDIC SURGERY

## 2020-11-12 PROCEDURE — 1101F PT FALLS ASSESS-DOCD LE1/YR: CPT | Mod: HCNC,CPTII,S$GLB, | Performed by: ORTHOPAEDIC SURGERY

## 2020-11-12 PROCEDURE — 1126F AMNT PAIN NOTED NONE PRSNT: CPT | Mod: HCNC,S$GLB,, | Performed by: ORTHOPAEDIC SURGERY

## 2020-11-12 PROCEDURE — 1126F PR PAIN SEVERITY QUANTIFIED, NO PAIN PRESENT: ICD-10-PCS | Mod: HCNC,S$GLB,, | Performed by: ORTHOPAEDIC SURGERY

## 2020-11-12 PROCEDURE — 99214 PR OFFICE/OUTPT VISIT, EST, LEVL IV, 30-39 MIN: ICD-10-PCS | Mod: HCNC,S$GLB,, | Performed by: ORTHOPAEDIC SURGERY

## 2020-11-12 NOTE — PROGRESS NOTES
Subjective:      Patient ID: Diann Wasserman is a 65 y.o. female.    Chief Complaint: Follow-up of the Right Wrist      HPI  11/06/20  Diann Wasserman is a right hand dominant 65 y.o. female presenting today for evaluation of the right wrist. She states initial injury was 2 years ago, she had a fall onto the wrist. She did not ever seek medical attention for this but reports pain has persisted since this time. Pt states about 6-9 months ago, she had another fall onto the right wrist at which time her chronic wrist pain increased. Pain is located over the radial dorsal wrist, over the snuffbox. Pt was recently seen by her PCP who obtained an xray of the wrist which revealed a possible scaphoid fracture. She states she has previously worn a brace for about one week but otherwise has avoided using the wrist much secondary to pain. Denies numbness.     11/12/20  Diann Wasserman is a 65 y.o. female returns for right wrist & CT scan results.  Patient had injured her wrist 2 years ago from a fall off a chair she thinks maybe that is where she originally had the problem with the scapholunate ligament disruption    Review of patient's allergies indicates:   Allergen Reactions    Erythromycin Rash    Penicillins Rash and Other (See Comments)     Redness         Current Outpatient Medications   Medication Sig Dispense Refill    ibuprofen (ADVIL,MOTRIN) 600 MG tablet Take 1 tablet (600 mg total) by mouth 2 (two) times daily as needed for Pain. 60 tablet 1    nitrofurantoin, macrocrystal-monohydrate, (MACROBID) 100 MG capsule Take 1 capsule (100 mg total) by mouth 2 (two) times daily. (Patient not taking: Reported on 11/3/2020) 20 capsule 1    oxybutynin (DITROPAN-XL) 10 MG 24 hr tablet Take 1 tablet (10 mg total) by mouth once daily. (Patient not taking: Reported on 11/3/2020) 30 tablet 11     No current facility-administered medications for this visit.        Past Medical History:   Diagnosis Date     "Arthritis     Right hip pain        Past Surgical History:   Procedure Laterality Date    BREAST SURGERY Bilateral     breast lift    CHOLECYSTECTOMY  2012    COLONOSCOPY N/A 4/6/2016    Procedure: COLONOSCOPY;  Surgeon: Luis Bogran-Reyes, MD;  Location: [facility];  Service: Endoscopy;  Laterality: N/A;    COSMETIC SURGERY  1985    bilateral breast lift    HIP SURGERY  11/2014    right total    JOINT REPLACEMENT      TOTAL REDUCTION MAMMOPLASTY      TUBAL LIGATION         Review of Systems:  Constitutional: Negative for chills and fever.   Respiratory: Negative for cough and shortness of breath.    Gastrointestinal: Negative for nausea and vomiting.   Skin: Negative for rash.   Neurological: Negative for dizziness and headaches.   Psychiatric/Behavioral: Negative for depression.   MSK as in HPI       OBJECTIVE:     PHYSICAL EXAM:  BP (!) 138/91   Pulse 79   Ht 5' 2.01" (1.575 m)   Wt 65.8 kg (145 lb 1 oz)   BMI 26.53 kg/m²     GEN:  NAD, well-developed, well-groomed.  NEURO: Awake, alert, and oriented. Normal attention and concentration.    PSYCH: Normal mood and affect. Behavior is normal.  HEENT: No cervical lymphadenopathy noted.  CARDIOVASCULAR: Radial pulses 2+ bilaterally. No LE edema noted.  PULMONARY: Breath sounds normal. No respiratory distress.  SKIN: Intact, no rashes.      MSK:   RUE:  Good active ROM of the wrist and fingers. Pain with wrist flexion. ttp at the snuffbox. There is a small area of edema present in the area. AIN/PIN/Radial/Median/Ulnar Nerves assessed in isolation without deficit. Radial & Ulnar arteries palpated 2+. Capillary Refill <3s.    RADIOGRAPHS:  MRI R hand 11/10/20  No evidence of acute scaphoid fracture. 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 Dorsal intercalated segment instability.    Xray right wrist 11/3/20  Subtle lucency through the distal scaphoid on one view only for which a subtle " fracture is not excluded.  Please correlate for point tenderness. Widening of the scapholunate distance concerning for underlying ligamentous injury.    Xray right hand 11/3/20  No acute osseous abnormality identified.  Comments: I have personally reviewed the imaging and I agree with the above radiologist's report.    ASSESSMENT/PLAN:     No diagnosis found.  Plan:     Reviewed CT scapholunate advanced collapse with the patient patient understands she states that the wrist brace makes it feel better we also discussed anti-inflammatory diet conservative treatment discussed with patient injection is indicated the pain increases at this point no surgical treatment      The patient indicates understanding of these issues and agrees to the plan.    This note has been scribed in part by Domonique Pastor MS, [facility], my Sports Medicine Assistant (SMA). This SMA performed & documented a complete history pre-assessment including the history of present illness, which I, Veronique Morelos MD, explored & confirmed personally with the patient. The SMA has scribed portions of this note including my physical exanimation, diagnostic imaging interpretation, procedures performed, my plan of care & diagnosis. I agree that the scribed documentation is accurate & complete.

## 2020-11-30 ENCOUNTER — HOSPITAL ENCOUNTER (OUTPATIENT)
Dept: RADIOLOGY | Facility: HOSPITAL | Age: 65
Discharge: HOME | End: 2020-11-30
Attending: INTERNAL MEDICINE
Payer: MEDICARE

## 2020-11-30 DIAGNOSIS — Z12.31 BREAST CANCER SCREENING BY MAMMOGRAM: ICD-10-CM

## 2020-11-30 PROCEDURE — 77067 SCR MAMMO BI INCL CAD: CPT | Mod: TC,HCNC

## 2020-11-30 PROCEDURE — 77063 MAMMO DIGITAL SCREENING BILAT WITH TOMO: ICD-10-PCS | Mod: 26,HCNC,, | Performed by: RADIOLOGY

## 2020-11-30 PROCEDURE — 77063 BREAST TOMOSYNTHESIS BI: CPT | Mod: 26,HCNC,, | Performed by: RADIOLOGY

## 2020-11-30 PROCEDURE — 77067 MAMMO DIGITAL SCREENING BILAT WITH TOMO: ICD-10-PCS | Mod: 26,HCNC,, | Performed by: RADIOLOGY

## 2020-11-30 PROCEDURE — 77067 SCR MAMMO BI INCL CAD: CPT | Mod: 26,HCNC,, | Performed by: RADIOLOGY

## 2021-04-16 ENCOUNTER — PATIENT MESSAGE (OUTPATIENT)
Dept: RESEARCH | Facility: HOSPITAL | Age: 66
End: 2021-04-16

## 2021-10-27 ENCOUNTER — LAB VISIT (OUTPATIENT)
Dept: LAB | Facility: HOSPITAL | Age: 66
End: 2021-10-27
Attending: INTERNAL MEDICINE
Payer: MEDICARE

## 2021-10-27 ENCOUNTER — OFFICE VISIT (OUTPATIENT)
Dept: INTERNAL MEDICINE | Facility: CLINIC | Age: 66
End: 2021-10-27
Payer: MEDICARE

## 2021-10-27 VITALS
SYSTOLIC BLOOD PRESSURE: 130 MMHG | HEIGHT: 62 IN | HEART RATE: 67 BPM | WEIGHT: 142.44 LBS | BODY MASS INDEX: 26.21 KG/M2 | OXYGEN SATURATION: 99 % | DIASTOLIC BLOOD PRESSURE: 78 MMHG

## 2021-10-27 DIAGNOSIS — R10.13 EPIGASTRIC PAIN: Primary | ICD-10-CM

## 2021-10-27 DIAGNOSIS — R10.13 EPIGASTRIC PAIN: ICD-10-CM

## 2021-10-27 DIAGNOSIS — E78.5 HYPERLIPIDEMIA, UNSPECIFIED HYPERLIPIDEMIA TYPE: ICD-10-CM

## 2021-10-27 DIAGNOSIS — Z00.00 HEALTH CARE MAINTENANCE: ICD-10-CM

## 2021-10-27 LAB
ALBUMIN SERPL BCP-MCNC: 4.3 G/DL (ref 3.5–5.2)
ALP SERPL-CCNC: 67 U/L (ref 55–135)
ALT SERPL W/O P-5'-P-CCNC: 16 U/L (ref 10–44)
ANION GAP SERPL CALC-SCNC: 12 MMOL/L (ref 8–16)
AST SERPL-CCNC: 17 U/L (ref 10–40)
BASOPHILS # BLD AUTO: 0.05 K/UL (ref 0–0.2)
BASOPHILS NFR BLD: 0.7 % (ref 0–1.9)
BILIRUB SERPL-MCNC: 0.3 MG/DL (ref 0.1–1)
BUN SERPL-MCNC: 14 MG/DL (ref 8–23)
CALCIUM SERPL-MCNC: 10.1 MG/DL (ref 8.7–10.5)
CHLORIDE SERPL-SCNC: 103 MMOL/L (ref 95–110)
CHOLEST SERPL-MCNC: 201 MG/DL (ref 120–199)
CHOLEST/HDLC SERPL: 2.8 {RATIO} (ref 2–5)
CO2 SERPL-SCNC: 26 MMOL/L (ref 23–29)
CREAT SERPL-MCNC: 0.7 MG/DL (ref 0.5–1.4)
DIFFERENTIAL METHOD: NORMAL
EOSINOPHIL # BLD AUTO: 0.1 K/UL (ref 0–0.5)
EOSINOPHIL NFR BLD: 1.7 % (ref 0–8)
ERYTHROCYTE [DISTWIDTH] IN BLOOD BY AUTOMATED COUNT: 12.5 % (ref 11.5–14.5)
EST. GFR  (AFRICAN AMERICAN): >60 ML/MIN/1.73 M^2
EST. GFR  (NON AFRICAN AMERICAN): >60 ML/MIN/1.73 M^2
GLUCOSE SERPL-MCNC: 93 MG/DL (ref 70–110)
HCT VFR BLD AUTO: 38.8 % (ref 37–48.5)
HDLC SERPL-MCNC: 73 MG/DL (ref 40–75)
HDLC SERPL: 36.3 % (ref 20–50)
HGB BLD-MCNC: 12.8 G/DL (ref 12–16)
IMM GRANULOCYTES # BLD AUTO: 0.02 K/UL (ref 0–0.04)
IMM GRANULOCYTES NFR BLD AUTO: 0.3 % (ref 0–0.5)
LDLC SERPL CALC-MCNC: 112 MG/DL (ref 63–159)
LYMPHOCYTES # BLD AUTO: 2.4 K/UL (ref 1–4.8)
LYMPHOCYTES NFR BLD: 33.1 % (ref 18–48)
MCH RBC QN AUTO: 30 PG (ref 27–31)
MCHC RBC AUTO-ENTMCNC: 33 G/DL (ref 32–36)
MCV RBC AUTO: 91 FL (ref 82–98)
MONOCYTES # BLD AUTO: 0.6 K/UL (ref 0.3–1)
MONOCYTES NFR BLD: 9 % (ref 4–15)
NEUTROPHILS # BLD AUTO: 3.9 K/UL (ref 1.8–7.7)
NEUTROPHILS NFR BLD: 55.2 % (ref 38–73)
NONHDLC SERPL-MCNC: 128 MG/DL
NRBC BLD-RTO: 0 /100 WBC
PLATELET # BLD AUTO: 305 K/UL (ref 150–450)
PMV BLD AUTO: 10.1 FL (ref 9.2–12.9)
POTASSIUM SERPL-SCNC: 4.3 MMOL/L (ref 3.5–5.1)
PROT SERPL-MCNC: 7.7 G/DL (ref 6–8.4)
RBC # BLD AUTO: 4.26 M/UL (ref 4–5.4)
SODIUM SERPL-SCNC: 141 MMOL/L (ref 136–145)
TRIGL SERPL-MCNC: 80 MG/DL (ref 30–150)
WBC # BLD AUTO: 7.13 K/UL (ref 3.9–12.7)

## 2021-10-27 PROCEDURE — 1125F PR PAIN SEVERITY QUANTIFIED, PAIN PRESENT: ICD-10-PCS | Mod: HCNC,CPTII,S$GLB, | Performed by: INTERNAL MEDICINE

## 2021-10-27 PROCEDURE — 80061 LIPID PANEL: CPT | Mod: HCNC | Performed by: INTERNAL MEDICINE

## 2021-10-27 PROCEDURE — 3075F PR MOST RECENT SYSTOLIC BLOOD PRESS GE 130-139MM HG: ICD-10-PCS | Mod: HCNC,CPTII,S$GLB, | Performed by: INTERNAL MEDICINE

## 2021-10-27 PROCEDURE — 1160F PR REVIEW ALL MEDS BY PRESCRIBER/CLIN PHARMACIST DOCUMENTED: ICD-10-PCS | Mod: HCNC,CPTII,S$GLB, | Performed by: INTERNAL MEDICINE

## 2021-10-27 PROCEDURE — 1101F PT FALLS ASSESS-DOCD LE1/YR: CPT | Mod: HCNC,CPTII,S$GLB, | Performed by: INTERNAL MEDICINE

## 2021-10-27 PROCEDURE — 36415 COLL VENOUS BLD VENIPUNCTURE: CPT | Mod: HCNC | Performed by: INTERNAL MEDICINE

## 2021-10-27 PROCEDURE — 1101F PR PT FALLS ASSESS DOC 0-1 FALLS W/OUT INJ PAST YR: ICD-10-PCS | Mod: HCNC,CPTII,S$GLB, | Performed by: INTERNAL MEDICINE

## 2021-10-27 PROCEDURE — 1159F MED LIST DOCD IN RCRD: CPT | Mod: HCNC,CPTII,S$GLB, | Performed by: INTERNAL MEDICINE

## 2021-10-27 PROCEDURE — 99999 PR PBB SHADOW E&M-EST. PATIENT-LVL IV: ICD-10-PCS | Mod: PBBFAC,HCNC,, | Performed by: INTERNAL MEDICINE

## 2021-10-27 PROCEDURE — 3288F FALL RISK ASSESSMENT DOCD: CPT | Mod: HCNC,CPTII,S$GLB, | Performed by: INTERNAL MEDICINE

## 2021-10-27 PROCEDURE — 3288F PR FALLS RISK ASSESSMENT DOCUMENTED: ICD-10-PCS | Mod: HCNC,CPTII,S$GLB, | Performed by: INTERNAL MEDICINE

## 2021-10-27 PROCEDURE — 99499 UNLISTED E&M SERVICE: CPT | Mod: HCNC,S$GLB,, | Performed by: INTERNAL MEDICINE

## 2021-10-27 PROCEDURE — 3075F SYST BP GE 130 - 139MM HG: CPT | Mod: HCNC,CPTII,S$GLB, | Performed by: INTERNAL MEDICINE

## 2021-10-27 PROCEDURE — 1159F PR MEDICATION LIST DOCUMENTED IN MEDICAL RECORD: ICD-10-PCS | Mod: HCNC,CPTII,S$GLB, | Performed by: INTERNAL MEDICINE

## 2021-10-27 PROCEDURE — 99214 PR OFFICE/OUTPT VISIT, EST, LEVL IV, 30-39 MIN: ICD-10-PCS | Mod: HCNC,S$GLB,, | Performed by: INTERNAL MEDICINE

## 2021-10-27 PROCEDURE — 85025 COMPLETE CBC W/AUTO DIFF WBC: CPT | Mod: HCNC | Performed by: INTERNAL MEDICINE

## 2021-10-27 PROCEDURE — 3078F DIAST BP <80 MM HG: CPT | Mod: HCNC,CPTII,S$GLB, | Performed by: INTERNAL MEDICINE

## 2021-10-27 PROCEDURE — 99499 RISK ADDL DX/OHS AUDIT: ICD-10-PCS | Mod: HCNC,S$GLB,, | Performed by: INTERNAL MEDICINE

## 2021-10-27 PROCEDURE — 80053 COMPREHEN METABOLIC PANEL: CPT | Mod: HCNC | Performed by: INTERNAL MEDICINE

## 2021-10-27 PROCEDURE — 99999 PR PBB SHADOW E&M-EST. PATIENT-LVL IV: CPT | Mod: PBBFAC,HCNC,, | Performed by: INTERNAL MEDICINE

## 2021-10-27 PROCEDURE — 99214 OFFICE O/P EST MOD 30 MIN: CPT | Mod: HCNC,S$GLB,, | Performed by: INTERNAL MEDICINE

## 2021-10-27 PROCEDURE — 1160F RVW MEDS BY RX/DR IN RCRD: CPT | Mod: HCNC,CPTII,S$GLB, | Performed by: INTERNAL MEDICINE

## 2021-10-27 PROCEDURE — 3078F PR MOST RECENT DIASTOLIC BLOOD PRESSURE < 80 MM HG: ICD-10-PCS | Mod: HCNC,CPTII,S$GLB, | Performed by: INTERNAL MEDICINE

## 2021-10-27 PROCEDURE — 1125F AMNT PAIN NOTED PAIN PRSNT: CPT | Mod: HCNC,CPTII,S$GLB, | Performed by: INTERNAL MEDICINE

## 2021-10-27 PROCEDURE — 3008F BODY MASS INDEX DOCD: CPT | Mod: HCNC,CPTII,S$GLB, | Performed by: INTERNAL MEDICINE

## 2021-10-27 PROCEDURE — 3008F PR BODY MASS INDEX (BMI) DOCUMENTED: ICD-10-PCS | Mod: HCNC,CPTII,S$GLB, | Performed by: INTERNAL MEDICINE

## 2021-10-27 RX ORDER — ESOMEPRAZOLE MAGNESIUM 40 MG/1
40 CAPSULE, DELAYED RELEASE ORAL
Qty: 60 CAPSULE | Refills: 0 | Status: SHIPPED | OUTPATIENT
Start: 2021-10-27 | End: 2022-06-15

## 2021-11-05 ENCOUNTER — HOSPITAL ENCOUNTER (OUTPATIENT)
Dept: RADIOLOGY | Facility: HOSPITAL | Age: 66
Discharge: HOME | End: 2021-11-05
Attending: INTERNAL MEDICINE
Payer: MEDICARE

## 2021-11-05 DIAGNOSIS — R10.13 EPIGASTRIC PAIN: ICD-10-CM

## 2021-11-05 PROCEDURE — 76700 US EXAM ABDOM COMPLETE: CPT | Mod: 26,HCNC,, | Performed by: INTERNAL MEDICINE

## 2021-11-05 PROCEDURE — 76700 US ABDOMEN COMPLETE: ICD-10-PCS | Mod: 26,HCNC,, | Performed by: INTERNAL MEDICINE

## 2021-11-05 PROCEDURE — 76700 US EXAM ABDOM COMPLETE: CPT | Mod: TC,HCNC

## 2021-12-15 ENCOUNTER — OFFICE VISIT (OUTPATIENT)
Dept: OPTOMETRY | Facility: CLINIC | Age: 66
End: 2021-12-15
Payer: MEDICARE

## 2021-12-15 DIAGNOSIS — H52.203 HYPEROPIA OF BOTH EYES WITH ASTIGMATISM: ICD-10-CM

## 2021-12-15 DIAGNOSIS — H25.13 NUCLEAR SCLEROSIS OF BOTH EYES: Primary | ICD-10-CM

## 2021-12-15 DIAGNOSIS — H52.4 PRESBYOPIA OF BOTH EYES: ICD-10-CM

## 2021-12-15 DIAGNOSIS — D31.42 IRIS NEVUS, LEFT: ICD-10-CM

## 2021-12-15 DIAGNOSIS — H52.03 HYPEROPIA OF BOTH EYES WITH ASTIGMATISM: ICD-10-CM

## 2021-12-15 PROCEDURE — 99999 PR PBB SHADOW E&M-EST. PATIENT-LVL III: CPT | Mod: PBBFAC,HCNC,, | Performed by: OPTOMETRIST

## 2021-12-15 PROCEDURE — 92012 PR EYE EXAM, EST PATIENT,INTERMED: ICD-10-PCS | Mod: HCNC,S$GLB,, | Performed by: OPTOMETRIST

## 2021-12-15 PROCEDURE — 99999 PR PBB SHADOW E&M-EST. PATIENT-LVL III: ICD-10-PCS | Mod: PBBFAC,HCNC,, | Performed by: OPTOMETRIST

## 2021-12-15 PROCEDURE — 92015 PR REFRACTION: ICD-10-PCS | Mod: HCNC,S$GLB,, | Performed by: OPTOMETRIST

## 2021-12-15 PROCEDURE — 92012 INTRM OPH EXAM EST PATIENT: CPT | Mod: HCNC,S$GLB,, | Performed by: OPTOMETRIST

## 2021-12-15 PROCEDURE — 92285 EXTERNAL OCULAR PHOTOGRAPHY: CPT | Mod: HCNC,S$GLB,, | Performed by: OPTOMETRIST

## 2021-12-15 PROCEDURE — 92015 DETERMINE REFRACTIVE STATE: CPT | Mod: HCNC,S$GLB,, | Performed by: OPTOMETRIST

## 2021-12-15 PROCEDURE — 92285 EXTERNAL PHOTOGRAPHY - OU - BOTH EYES: ICD-10-PCS | Mod: HCNC,S$GLB,, | Performed by: OPTOMETRIST

## 2021-12-21 ENCOUNTER — TELEPHONE (OUTPATIENT)
Dept: OPHTHALMOLOGY | Facility: CLINIC | Age: 66
End: 2021-12-21
Payer: MEDICARE

## 2022-01-03 ENCOUNTER — OFFICE VISIT (OUTPATIENT)
Dept: OPTOMETRY | Facility: CLINIC | Age: 67
End: 2022-01-03
Payer: MEDICARE

## 2022-01-03 DIAGNOSIS — Z46.0 ENCOUNTER FOR FITTING OR ADJUSTMENT OF SPECTACLES OR CONTACT LENSES: Primary | ICD-10-CM

## 2022-01-03 PROCEDURE — 92310 PR CONTACT LENS FITTING (LOW COMPLEXITY): ICD-10-PCS | Mod: CSM,HCNC,S$GLB, | Performed by: OPTOMETRIST

## 2022-01-03 PROCEDURE — 99499 UNLISTED E&M SERVICE: CPT | Mod: HCNC,S$GLB,, | Performed by: OPTOMETRIST

## 2022-01-03 PROCEDURE — 1101F PR PT FALLS ASSESS DOC 0-1 FALLS W/OUT INJ PAST YR: ICD-10-PCS | Mod: HCNC,CPTII,S$GLB, | Performed by: OPTOMETRIST

## 2022-01-03 PROCEDURE — 92310 CONTACT LENS FITTING OU: CPT | Mod: CSM,HCNC,S$GLB, | Performed by: OPTOMETRIST

## 2022-01-03 PROCEDURE — 3288F FALL RISK ASSESSMENT DOCD: CPT | Mod: HCNC,CPTII,S$GLB, | Performed by: OPTOMETRIST

## 2022-01-03 PROCEDURE — 1101F PT FALLS ASSESS-DOCD LE1/YR: CPT | Mod: HCNC,CPTII,S$GLB, | Performed by: OPTOMETRIST

## 2022-01-03 PROCEDURE — 99999 PR PBB SHADOW E&M-EST. PATIENT-LVL III: ICD-10-PCS | Mod: PBBFAC,HCNC,, | Performed by: OPTOMETRIST

## 2022-01-03 PROCEDURE — 99999 PR PBB SHADOW E&M-EST. PATIENT-LVL III: CPT | Mod: PBBFAC,HCNC,, | Performed by: OPTOMETRIST

## 2022-01-03 PROCEDURE — 1159F MED LIST DOCD IN RCRD: CPT | Mod: HCNC,CPTII,S$GLB, | Performed by: OPTOMETRIST

## 2022-01-03 PROCEDURE — 1159F PR MEDICATION LIST DOCUMENTED IN MEDICAL RECORD: ICD-10-PCS | Mod: HCNC,CPTII,S$GLB, | Performed by: OPTOMETRIST

## 2022-01-03 PROCEDURE — 99499 NO LOS: ICD-10-PCS | Mod: HCNC,S$GLB,, | Performed by: OPTOMETRIST

## 2022-01-03 PROCEDURE — 3288F PR FALLS RISK ASSESSMENT DOCUMENTED: ICD-10-PCS | Mod: HCNC,CPTII,S$GLB, | Performed by: OPTOMETRIST

## 2022-01-03 PROCEDURE — 1126F PR PAIN SEVERITY QUANTIFIED, NO PAIN PRESENT: ICD-10-PCS | Mod: HCNC,CPTII,S$GLB, | Performed by: OPTOMETRIST

## 2022-01-03 PROCEDURE — 1126F AMNT PAIN NOTED NONE PRSNT: CPT | Mod: HCNC,CPTII,S$GLB, | Performed by: OPTOMETRIST

## 2022-01-03 NOTE — PROGRESS NOTES
HPI     Contact Lens Fit      Additional comments: In for contact lens fitting.  Ms. Wasserman in today for SCLs (for distance OU) for occasional use.  Refer to CL section of record               Comments     Patient's age: 66 y.o. WF  Occupation: retired/homemaker   Approximate date of last eye examination:  12/15/2021  Name of last eye doctor seen:     City/State: [facility]   Wears glasses? Yes      If yes, wears  Full-time or part-time?  Full-time   Present glasses are: Bifocal, SV Distance, SV Reading?  Bifocal   Approximate age of present glasses:  1 year    Got new glasses following last exam, or subsequently?:  yes   Any problem with VA with glasses?  no  Wears CLs?:  used to wear CLs - no longer, but interested in wearing CLs   on occasional basis (social events, etc).   Headaches?  no  Eye pain/discomfort?  no                                                                                     Flashes?  no  Floaters?  no  Diplopia/Double vision?  no  Patient's Ocular History:         Any eye surgeries? no         Any eye injury?  no         Any treatment for eye disease?  no  Family history of eye disease?  no  Significant patient medical history:         1. Diabetes?  no       If yes, IDDM or NIDDM?   N/a   2. HBP?  no                ! OTC eyedrops currently using:  none   ! Prescription eye meds currently using:  no   ! Any history of allergy/adverse reaction to any eye meds used   previously?  no    ! Any history of allergy/adverse reaction to eyedrops used during prior   eye exam(s)? no    ! Any history of allergy/adverse reaction to Novacaine or similar meds?   no   ! Any history of allergy/adverse reaction to Epinephrine or similar meds?   no    ! Patient okay with use of anesthetic eyedrops to check eye pressure?    yes         ! Patient okay with use of eyedrops to dilate pupils today?  yes   !  Allergies/Medications/Medical History/Family History reviewed today?    yes      PD =    "63/59  Desired reading distance =  17.5"                                                                        Last edited by Felix Lynch, OD on 1/3/2022  8:57 AM. (History)            Assessment /Plan     For exam results, see Encounter Report.    1. Encounter for fitting or adjustment of spectacles or contact lenses                  In for contact lens fitting for CLs for distance OU -for occasional wear.  Disensed trial Biofinity Toric SCLs:     OD  8.7  14.5   +2.00 -0.75 x 090   Distance     OS  8.7  14.5   +2.50 -1.25 x 090    Distance    Daily wear.  Clear and soak nightly.  Do not sleep with CLs inl  opti free Pure Moist or Acuvue Revitalens contact lens solution.  Part-time wear okay.  Suggest use of +2.00 over the counter reading glasses over CLs as needed.   Return in seven to ten days for contact lens follow-up - have CLs in for at least a few hours at the time of the appointment.  Call/return sooner, if any problems noted.           "

## 2022-01-10 ENCOUNTER — OFFICE VISIT (OUTPATIENT)
Dept: OPTOMETRY | Facility: CLINIC | Age: 67
End: 2022-01-10
Payer: MEDICARE

## 2022-01-10 DIAGNOSIS — Z46.0 ENCOUNTER FOR FITTING OR ADJUSTMENT OF SPECTACLES OR CONTACT LENSES: Primary | ICD-10-CM

## 2022-01-10 PROCEDURE — 3288F PR FALLS RISK ASSESSMENT DOCUMENTED: ICD-10-PCS | Mod: HCNC,CPTII,S$GLB, | Performed by: OPTOMETRIST

## 2022-01-10 PROCEDURE — 1159F MED LIST DOCD IN RCRD: CPT | Mod: HCNC,CPTII,S$GLB, | Performed by: OPTOMETRIST

## 2022-01-10 PROCEDURE — 92499 PR CONTACT LENS F/U LEV 1: ICD-10-PCS | Mod: ,,, | Performed by: OPTOMETRIST

## 2022-01-10 PROCEDURE — 99499 UNLISTED E&M SERVICE: CPT | Mod: HCNC,S$GLB,, | Performed by: OPTOMETRIST

## 2022-01-10 PROCEDURE — 1101F PT FALLS ASSESS-DOCD LE1/YR: CPT | Mod: HCNC,CPTII,S$GLB, | Performed by: OPTOMETRIST

## 2022-01-10 PROCEDURE — 1126F PR PAIN SEVERITY QUANTIFIED, NO PAIN PRESENT: ICD-10-PCS | Mod: HCNC,CPTII,S$GLB, | Performed by: OPTOMETRIST

## 2022-01-10 PROCEDURE — 99499 NO LOS: ICD-10-PCS | Mod: HCNC,S$GLB,, | Performed by: OPTOMETRIST

## 2022-01-10 PROCEDURE — 92499 UNLISTED OPH SVC/PROCEDURE: CPT | Mod: ,,, | Performed by: OPTOMETRIST

## 2022-01-10 PROCEDURE — 1159F PR MEDICATION LIST DOCUMENTED IN MEDICAL RECORD: ICD-10-PCS | Mod: HCNC,CPTII,S$GLB, | Performed by: OPTOMETRIST

## 2022-01-10 PROCEDURE — 3288F FALL RISK ASSESSMENT DOCD: CPT | Mod: HCNC,CPTII,S$GLB, | Performed by: OPTOMETRIST

## 2022-01-10 PROCEDURE — 1126F AMNT PAIN NOTED NONE PRSNT: CPT | Mod: HCNC,CPTII,S$GLB, | Performed by: OPTOMETRIST

## 2022-01-10 PROCEDURE — 1101F PR PT FALLS ASSESS DOC 0-1 FALLS W/OUT INJ PAST YR: ICD-10-PCS | Mod: HCNC,CPTII,S$GLB, | Performed by: OPTOMETRIST

## 2022-01-11 NOTE — PATIENT INSTRUCTIONS
Mrs. Wasserman in today for initial contact lens follow-up visit with trial Biofinity Toric SCLs with distance power in each lens.  Good lens fit in each eye, and wearing CLs well in both eyes.  Mrs. Wasserman very happy with lens comfort.  However, showing need for power change in each contact lenses to achieve optimal best-corrected VA with CLs.  Will have CL department order and dispense new pair of Biofinity Toric SCLs:    OD  8.7  14.5 +2.50 -0.75 x 090  Distance    OS  8.7.14.5   +3.00 -1.25 x 090  Distance  After wearing new trial lenses for several days, call/email with report on satisfaction with VA with new trial lenses.  If happy with VA in each eye, will finalize the CL RX and will send to Mrs. Wasserman.  However, if any problems noted, will have her return for an additional contact lens follow-up visit.

## 2022-01-11 NOTE — PROGRESS NOTES
HPI     Contact Lens Follow Up      Additional comments: In for contact lens followup  Wearing trial Biofinity Toric SCLs:     OD  8.7  14.5   +2.00 -0.75 x 090   Distance     OS  8.7  14.5   +2.50 -1.25 x 090    Distance    Ms. Wasserman happy with CLs - good comfort OU, and happy with VA with   CLs.  Using reading glasses over CLs.               Comments     Patient's age: 66 y.o. WF  Occupation: retired/homemaker   Approximate date of last eye examination:  12/15/2021  Name of last eye doctor seen:     City/State: [facility]   Wears glasses? Yes      If yes, wears  Full-time or part-time?  Full-time   Present glasses are: Bifocal, SV Distance, SV Reading?  Bifocal   Approximate age of present glasses:  1 year    Got new glasses following last exam, or subsequently?:  no   Any problem with VA with glasses?  no  Wears CLs?:  yes           If yes:              Type of CL worn:                Wears full-time or part-time:  part-time                Sleeps with contact lenses:  no               CL Solution used:  optic- free                How often replace CLs:  not sure               Any problem with VA with CLs?  no              Has patient read and signed the contact lens fee information   document? no  Headaches?  no  Eye pain/discomfort?  no                                                                                     Flashes?  no  Floaters?  no  Diplopia/Double vision?  no  Patient's Ocular History:         Any eye surgeries? no         Any eye injury?  no         Any treatment for eye disease?  no  Family history of eye disease?  no  Significant patient medical history:         1. Diabetes?  no       If yes, IDDM or NIDDM?   N/a   2. HBP?  no                ! OTC eyedrops currently using:  none   ! Prescription eye meds currently using:  no   ! Any history of allergy/adverse reaction to any eye meds used   previously?  no    ! Any history of allergy/adverse reaction to eyedrops used during prior  "  eye exam(s)? no    ! Any history of allergy/adverse reaction to Novacaine or similar meds?   no   ! Any history of allergy/adverse reaction to Epinephrine or similar meds?   no    ! Patient okay with use of anesthetic eyedrops to check eye pressure?    yes         ! Patient okay with use of eyedrops to dilate pupils today?  yes   !  Allergies/Medications/Medical History/Family History reviewed today?    yes      PD =   63/59  Desired reading distance =  17.5"                                                                        Last edited by Felix Lynch, OD on 1/10/2022  4:41 PM. (History)            Assessment /Plan     For exam results, see Encounter Report.    1. Encounter for fitting or adjustment of spectacles or contact lenses                    Mrs. Wasserman in today for initial contact lens follow-up visit with trial Biofinity Toric SCLs with distance power in each lens.  Good lens fit in each eye, and wearing CLs well in both eyes.  Mrs. Wasserman very happy with lens comfort.  However, showing need for power change in each contact lenses to achieve optimal best-corrected VA with CLs.  Will have CL department order and dispense new pair of Biofinity Toric SCLs:    OD  8.7  14.5 +2.50 -0.75 x 090  Distance    OS  8.7   14.5   +3.00 -1.25 x 090  Distance  After wearing new trial lenses for several days, call/email with report on satisfaction with VA with new trial lenses.  If happy with VA in each eye, will finalize the CL RX and will send to Mrs. Wasserman.  However, if any problems noted, will have her return for an additional contact lens follow-up visit.       "

## 2022-01-14 ENCOUNTER — PATIENT MESSAGE (OUTPATIENT)
Dept: OPTOMETRY | Facility: CLINIC | Age: 67
End: 2022-01-14
Payer: MEDICARE

## 2022-01-14 ENCOUNTER — TELEPHONE (OUTPATIENT)
Dept: OPHTHALMOLOGY | Facility: CLINIC | Age: 67
End: 2022-01-14
Payer: MEDICARE

## 2022-01-14 NOTE — TELEPHONE ENCOUNTER
----- Message from Jazmín Menard sent at 1/14/2022 11:32 AM CST -----  This person picked up her trials today.    Jazmín

## 2022-02-06 ENCOUNTER — HOSPITAL ENCOUNTER (EMERGENCY)
Facility: HOSPITAL | Age: 67
Discharge: HOME | End: 2022-02-06
Attending: EMERGENCY MEDICINE
Payer: MEDICARE

## 2022-02-06 VITALS
OXYGEN SATURATION: 98 % | RESPIRATION RATE: 16 BRPM | SYSTOLIC BLOOD PRESSURE: 138 MMHG | DIASTOLIC BLOOD PRESSURE: 65 MMHG | HEART RATE: 72 BPM | BODY MASS INDEX: 25.76 KG/M2 | TEMPERATURE: 99 F | WEIGHT: 140 LBS | HEIGHT: 62 IN
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
## 2022-02-06 DIAGNOSIS — M79.643 TENDERNESS OF ANATOMICAL SNUFFBOX: ICD-10-CM

## 2022-02-06 DIAGNOSIS — S69.91XA INJURY OF RIGHT WRIST, INITIAL ENCOUNTER: Primary | ICD-10-CM

## 2022-02-06 PROCEDURE — 99284 EMERGENCY DEPT VISIT MOD MDM: CPT | Mod: HCNC,,, | Performed by: PHYSICIAN ASSISTANT

## 2022-02-06 PROCEDURE — 25000003 PHARM REV CODE 250: Mod: HCNC | Performed by: PHYSICIAN ASSISTANT

## 2022-02-06 PROCEDURE — 99284 PR EMERGENCY DEPT VISIT,LEVEL IV: ICD-10-PCS | Mod: HCNC,,, | Performed by: PHYSICIAN ASSISTANT

## 2022-02-06 PROCEDURE — 99283 EMERGENCY DEPT VISIT LOW MDM: CPT | Mod: 25,HCNC

## 2022-02-06 RX ORDER — HYDROCODONE BITARTRATE AND ACETAMINOPHEN 7.5; 325 MG/1; MG/1
1 TABLET ORAL
Status: COMPLETED | OUTPATIENT
Start: 2022-02-06 | End: 2022-02-06

## 2022-02-06 RX ORDER — MELOXICAM 7.5 MG/1
7.5 TABLET ORAL DAILY
Qty: 30 TABLET | Refills: 0 | Status: SHIPPED | OUTPATIENT
Start: 2022-02-06 | End: 2022-03-08

## 2022-02-06 RX ORDER — ACETAMINOPHEN 325 MG/1
650 TABLET ORAL
Status: COMPLETED | OUTPATIENT
Start: 2022-02-06 | End: 2022-02-06

## 2022-02-06 RX ADMIN — HYDROCODONE BITARTRATE AND ACETAMINOPHEN 1 TABLET: 7.5; 325 TABLET ORAL at 11:02

## 2022-02-06 RX ADMIN — ACETAMINOPHEN 650 MG: 325 TABLET ORAL at 11:02

## 2022-02-06 NOTE — DISCHARGE INSTRUCTIONS
-take medication as prescribed, Add 1000mg tylenol every 8 hours if needed for increased pain control.  -wear splint until follow up with Dr. Morelos's office (referral placed)  -return to ER for acute wrist drop or color change

## 2022-02-06 NOTE — ED TRIAGE NOTES
Pt reports falling yesterday and catching herself with hands. PT reports R hand pain along thumb side.       LOC: The patient is awake, alert and aware of environment with an appropriate affect, the patient is oriented x 3 and speaking appropriately.  APPEARANCE: Patient resting comfortably and in no acute distress, patient is clean and well groomed, patient's clothing is properly fastened.  SKIN: The skin is warm and dry, color consistent with ethnicity, patient has normal skin turgor and moist mucus membranes, skin intact, no breakdown or bruising noted.  MUSCULOSKELETAL: Patient moving all extremities spontaneously, R hand swelling  RESPIRATORY: Airway is open and patent, respirations are spontaneous, patient has a normal effort and rate, no accessory muscle use noted,  CARDIAC: Patient has a normal rate and regular rhythm, no periphreal edema noted, capillary refill < 3 seconds.  ABDOMEN: Soft and non tender to palpation, no distention noted, normoactive bowel sounds present in all four quadrants.  NEUROLOGIC:  facial expression is symmetrical, patient moving all extremities spontaneously, normal sensation in all extremities when touched with a finger.  Follows all commands appropriately.

## 2022-02-06 NOTE — ED PROVIDER NOTES
Encounter Date: 2/6/2022       History     Chief Complaint   Patient presents with    Hand Injury     Right hand pain after trip and fall last night.      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 Reports FOOSH injury to right wrist while walking today. Mechanical fall. Pain to dorsum of wrist near thumb base. Denies other MSK injury. No pain meds PTA.  The patients available PMH, PSH, Social History, medications, allergies, and triage vital signs were reviewed just prior to their medical evaluation.  A ten point review of systems was completed and is negative except as documented above.  Patient denies any other acute medical complaint.    Please be advised this text was dictated with [employer] software and may contain errors due to translation.           Review of patient's allergies indicates:   Allergen Reactions    Erythromycin Rash    Penicillins Rash and Other (See Comments)     Redness     Past Medical History:   Diagnosis Date    Arthritis     Right hip pain      Past Surgical History:   Procedure Laterality Date    BREAST SURGERY Bilateral     breast lift    CHOLECYSTECTOMY  2012    COLONOSCOPY N/A 4/6/2016    Procedure: COLONOSCOPY;  Surgeon: Luis Bogran-Reyes, MD;  Location: [facility];  Service: Endoscopy;  Laterality: N/A;    COSMETIC SURGERY  1985    bilateral breast lift    HIP SURGERY  11/2014    right total    JOINT REPLACEMENT      TOTAL REDUCTION MAMMOPLASTY      TUBAL LIGATION       Family History   Problem Relation Age of Onset    Breast cancer Maternal Aunt 40    Breast cancer Cousin     Hypothyroidism Mother     Hypertension Father     Aortic aneurysm Father      Social History     Tobacco Use    Smoking status: Former Smoker     Packs/day: 1.00     Years: 3.00     Pack years: 3.00    Smokeless tobacco: Former User    Tobacco comment: less than 1ppd x 3 years   Substance  Use Topics    Alcohol use: Yes     Comment: occ    Drug use: No     Review of Systems   Constitutional: Negative for chills and fever.   HENT: Negative for sore throat.    Respiratory: Negative for shortness of breath.    Cardiovascular: Negative for chest pain.   Gastrointestinal: Negative for nausea and vomiting.   Genitourinary: Negative for dysuria.   Musculoskeletal: Positive for arthralgias. Negative for back pain.   Skin: Negative for rash.   Neurological: Negative for weakness and numbness.   Hematological: Does not bruise/bleed easily.       Physical Exam     Initial Vitals [02/06/22 1117]   BP Pulse Resp Temp SpO2   (!) 183/85 97 18 98.7 °F (37.1 °C) 98 %      MAP       --         Physical Exam    Nursing note and vitals reviewed.  Constitutional: She appears well-developed and well-nourished. She is not diaphoretic. No distress.   HENT:   Head: Normocephalic and atraumatic.   Right Ear: External ear normal.   Left Ear: External ear normal.   Mouth/Throat: Oropharynx is clear and moist.   Eyes: Conjunctivae and EOM are normal.   Cardiovascular: Normal rate, regular rhythm and intact distal pulses.   Pulmonary/Chest: No respiratory distress.   Musculoskeletal:         General: Tenderness and edema present.      Comments: RUE: point TTP snuffbox and dorsum of wrist with localized edema  ROM with pain  Radial 2+  nml cap refill, strength intact. Remainder of exam unremarkable     Neurological: She is alert and oriented to person, place, and time. She has normal strength. No cranial nerve deficit or sensory deficit.   Skin: Skin is warm and dry. Capillary refill takes less than 2 seconds. No rash noted. No erythema. No pallor.   Psychiatric: She has a normal mood and affect. Her behavior is normal. Judgment and thought content normal.         ED Course   Procedures  Labs Reviewed - No data to display       Imaging Results          X-Ray Hand 3 view Right (Final result)  Result time 02/06/22 12:39:09    Final  result by Ravi Patel MD (02/06/22 12:39:09)                 Impression:      1. No acute displaced fracture or dislocation of the hand.  No discrete acute abnormality of the thumb      Electronically signed by: Ravi Patel MD  Date:    02/06/2022  Time:    12:39             Narrative:    EXAMINATION:  XR HAND COMPLETE 3 VIEW RIGHT    CLINICAL HISTORY:  fall with thumb pain;    TECHNIQUE:  PA, lateral, and oblique views of the right hand were performed.    COMPARISON:  11/03/2020    FINDINGS:  Three views right hand.    There is osteopenia.  Degenerative changes are noted of the D IP and PIP joints.  No radiopaque foreign body.  No significant edema.  No acute displaced fracture or dislocation of the hand.                                 Medications   HYDROcodone-acetaminophen 7.5-325 mg per tablet 1 tablet (1 tablet Oral Given 2/6/22 1138)   acetaminophen tablet 650 mg (650 mg Oral Given 2/6/22 1143)     Medical Decision Making:   History:   Old Medical Records: I decided to obtain old medical records.  Old Records Summarized: records from clinic visits and records from previous admission(s).  Initial Assessment:   Patient presents for FOOSH injury to R wrist, dorsal edema and TTP. ROM intact. NV intact. VSS, afebrile  Differential Diagnosis:   DDx includes scaphoid/carpal fx, joint dissociation, MC fracture. Physical exam and history taking lower clinical suspicion for dislocation, radial fx.   Independently Interpreted Test(s):   I have ordered and independently interpreted X-rays - see prior notes.  Clinical Tests:   Radiological Study: Ordered and Reviewed             ED Course as of 02/07/22 0842   Sun Feb 06, 2022   1301 X-Ray Hand 3 view Right  No acute findings on XR- concern for occult fracture or further dissociation given history. Placed in Ace wrap (has splint at home), Rx mobic with referral to hang surgery [MF]      ED Course User Index  [MF] Carol Villa PA-C           Patient agreed  to plan of care and voiced understanding. Discharged in stable condition with strict ED return precautions.    Carol Villa PA-C  02/07/2022    Clinical Impression:   Final diagnoses:  [S69.91XA] Injury of right wrist, initial encounter (Primary)  [W19.XXXA] Fall, initial encounter  [M79.643] Tenderness of anatomical snuffbox          ED Disposition Condition    Discharge Stable        ED Prescriptions     Medication Sig Dispense Start Date End Date Auth. Provider    meloxicam (MOBIC) 7.5 MG tablet Take 1 tablet (7.5 mg total) by mouth once daily. 30 tablet 2/6/2022 3/8/2022 Carol Villa PA-C        Follow-up Information    None          Carol Villa PA-C  02/07/22 0890

## 2022-02-16 ENCOUNTER — DOCUMENTATION ONLY (OUTPATIENT)
Dept: OPHTHALMOLOGY | Facility: CLINIC | Age: 67
End: 2022-02-16
Payer: MEDICARE

## 2022-02-16 NOTE — PROGRESS NOTES
Assessment /Plan     For exam results, see Encounter Report.    There are no diagnoses linked to this encounter.  Refractive error OU  Wears SCLs OU          Refer to previous optometry encounter notes dated 01/10/2022  Received message from patient stating that she is happy with the last trial lenses dispensed to her, and she requests the CL Rx.  Plan:  New CL RX:  B & L Purevision 2 for Presbyopia SCLs        OD   8.6   14.0   +1.00 / High Add        OS    8.6   14.0   +3.50 / High Add                   Daily wear.  Clean and soak daily.  Replace monthly  Will enter new CL Rx into record dated 1/10/2022, and will send Rx to patient as she requests.     Felix Lynch, OD

## 2022-02-16 NOTE — PROGRESS NOTES
Assessment /Plan     For exam results, see Encounter Report.    Refractive error OU  Wears SCLs OU.             DISREGARD PREVIOUS DOCUMENTATION DATED THIS DATE (02/16/2022).    Refer to previous optometry encounter notes dated 01/16/2022  Received message from patient stating that  she is happy with the last trial lenses dispensed to her, and she requests the CL Rx.  Plan:  New CL RX:  Biofinity Toric SCLs        OD    8.7  14.5  +2.50 -1.75 x 090        OS     8.7  14.5  +3.00 -1.25 x 090                   Daily wear.  Clean and soak daily.  Replace monthly  Will enter new CL Rx into record dated 01/10/2022, and will send Rx to patient as she requests.     Felix Lynch, OD

## 2022-05-02 ENCOUNTER — OFFICE VISIT (OUTPATIENT)
Dept: INTERNAL MEDICINE | Facility: CLINIC | Age: 67
End: 2022-05-02
Payer: MEDICARE

## 2022-05-02 VITALS
SYSTOLIC BLOOD PRESSURE: 138 MMHG | HEART RATE: 66 BPM | HEIGHT: 62 IN | OXYGEN SATURATION: 96 % | WEIGHT: 142.19 LBS | BODY MASS INDEX: 26.17 KG/M2 | DIASTOLIC BLOOD PRESSURE: 78 MMHG

## 2022-05-02 DIAGNOSIS — Z12.31 ENCOUNTER FOR SCREENING MAMMOGRAM FOR BREAST CANCER: ICD-10-CM

## 2022-05-02 DIAGNOSIS — Z78.0 ASYMPTOMATIC MENOPAUSAL STATE: ICD-10-CM

## 2022-05-02 DIAGNOSIS — E78.5 HYPERLIPIDEMIA, UNSPECIFIED HYPERLIPIDEMIA TYPE: Primary | ICD-10-CM

## 2022-05-02 PROCEDURE — 1160F PR REVIEW ALL MEDS BY PRESCRIBER/CLIN PHARMACIST DOCUMENTED: ICD-10-PCS | Mod: CPTII,S$GLB,, | Performed by: INTERNAL MEDICINE

## 2022-05-02 PROCEDURE — 3008F PR BODY MASS INDEX (BMI) DOCUMENTED: ICD-10-PCS | Mod: CPTII,S$GLB,, | Performed by: INTERNAL MEDICINE

## 2022-05-02 PROCEDURE — 99999 PR PBB SHADOW E&M-EST. PATIENT-LVL IV: ICD-10-PCS | Mod: PBBFAC,,, | Performed by: INTERNAL MEDICINE

## 2022-05-02 PROCEDURE — 1126F PR PAIN SEVERITY QUANTIFIED, NO PAIN PRESENT: ICD-10-PCS | Mod: CPTII,S$GLB,, | Performed by: INTERNAL MEDICINE

## 2022-05-02 PROCEDURE — 1159F PR MEDICATION LIST DOCUMENTED IN MEDICAL RECORD: ICD-10-PCS | Mod: CPTII,S$GLB,, | Performed by: INTERNAL MEDICINE

## 2022-05-02 PROCEDURE — 3075F PR MOST RECENT SYSTOLIC BLOOD PRESS GE 130-139MM HG: ICD-10-PCS | Mod: CPTII,S$GLB,, | Performed by: INTERNAL MEDICINE

## 2022-05-02 PROCEDURE — 99999 PR PBB SHADOW E&M-EST. PATIENT-LVL IV: CPT | Mod: PBBFAC,,, | Performed by: INTERNAL MEDICINE

## 2022-05-02 PROCEDURE — 3078F PR MOST RECENT DIASTOLIC BLOOD PRESSURE < 80 MM HG: ICD-10-PCS | Mod: CPTII,S$GLB,, | Performed by: INTERNAL MEDICINE

## 2022-05-02 PROCEDURE — 1101F PT FALLS ASSESS-DOCD LE1/YR: CPT | Mod: CPTII,S$GLB,, | Performed by: INTERNAL MEDICINE

## 2022-05-02 PROCEDURE — 3078F DIAST BP <80 MM HG: CPT | Mod: CPTII,S$GLB,, | Performed by: INTERNAL MEDICINE

## 2022-05-02 PROCEDURE — 1101F PR PT FALLS ASSESS DOC 0-1 FALLS W/OUT INJ PAST YR: ICD-10-PCS | Mod: CPTII,S$GLB,, | Performed by: INTERNAL MEDICINE

## 2022-05-02 PROCEDURE — 1159F MED LIST DOCD IN RCRD: CPT | Mod: CPTII,S$GLB,, | Performed by: INTERNAL MEDICINE

## 2022-05-02 PROCEDURE — 1160F RVW MEDS BY RX/DR IN RCRD: CPT | Mod: CPTII,S$GLB,, | Performed by: INTERNAL MEDICINE

## 2022-05-02 PROCEDURE — 99214 PR OFFICE/OUTPT VISIT, EST, LEVL IV, 30-39 MIN: ICD-10-PCS | Mod: S$GLB,,, | Performed by: INTERNAL MEDICINE

## 2022-05-02 PROCEDURE — 1126F AMNT PAIN NOTED NONE PRSNT: CPT | Mod: CPTII,S$GLB,, | Performed by: INTERNAL MEDICINE

## 2022-05-02 PROCEDURE — 3075F SYST BP GE 130 - 139MM HG: CPT | Mod: CPTII,S$GLB,, | Performed by: INTERNAL MEDICINE

## 2022-05-02 PROCEDURE — 99214 OFFICE O/P EST MOD 30 MIN: CPT | Mod: S$GLB,,, | Performed by: INTERNAL MEDICINE

## 2022-05-02 PROCEDURE — 3288F PR FALLS RISK ASSESSMENT DOCUMENTED: ICD-10-PCS | Mod: CPTII,S$GLB,, | Performed by: INTERNAL MEDICINE

## 2022-05-02 PROCEDURE — 3288F FALL RISK ASSESSMENT DOCD: CPT | Mod: CPTII,S$GLB,, | Performed by: INTERNAL MEDICINE

## 2022-05-02 PROCEDURE — 3008F BODY MASS INDEX DOCD: CPT | Mod: CPTII,S$GLB,, | Performed by: INTERNAL MEDICINE

## 2022-05-02 NOTE — PROGRESS NOTES
"Subjective:       Patient ID: Diann Wasserman is a 66 y.o. female.    Chief Complaint: Follow-up (6 mth)    HPI   66 y.o. female here for follow up of    HLD, not on meds    Hx epigastric pain evaluated in oct '21. nexium x 4 weeks. Sx resolved. Now controlled with diet. Did have a flare after camping due to diet.     Review of Systems   Constitutional: Negative for fever.   Respiratory: Negative for shortness of breath.    Cardiovascular: Negative for chest pain.   Musculoskeletal: Negative.    Skin: Negative.        Objective:   /78 (BP Location: Right arm, Patient Position: Sitting, BP Method: Medium (Manual))   Pulse 66   Ht 5' 2" (1.575 m)   Wt 64.5 kg (142 lb 3.2 oz)   SpO2 96%   BMI 26.01 kg/m²      Physical Exam  Constitutional:       General: She is not in acute distress.     Appearance: She is well-developed. She is not diaphoretic.   HENT:      Head: Normocephalic and atraumatic.   Cardiovascular:      Rate and Rhythm: Normal rate and regular rhythm.   Pulmonary:      Effort: Pulmonary effort is normal. No respiratory distress.      Breath sounds: No wheezing or rales.   Skin:     General: Skin is warm and dry.   Neurological:      Mental Status: She is alert and oriented to person, place, and time.   Psychiatric:         Behavior: Behavior normal.         Assessment:       1. Hyperlipidemia, unspecified hyperlipidemia type    2. Asymptomatic menopausal state    3. Encounter for screening mammogram for breast cancer        Plan:       Diann was seen today for follow-up.    Diagnoses and all orders for this visit:    Hyperlipidemia, unspecified hyperlipidemia type  -     Comprehensive Metabolic Panel; Future  -     Lipid Panel; Future  -     CBC Auto Differential; Future  Labs in oct '22    Asymptomatic menopausal state  -     DXA Bone Density Spine And Hip; Future    Encounter for screening mammogram for breast cancer  -     Mammo Digital Screening Bilat w/ Jerome; Future          Covid, " tdap, shingrx recommended  dexa  Colonoscopy done 4/2016; repeat 10 years

## 2022-06-15 ENCOUNTER — OFFICE VISIT (OUTPATIENT)
Dept: INTERNAL MEDICINE | Facility: CLINIC | Age: 67
End: 2022-06-15
Payer: MEDICARE

## 2022-06-15 VITALS
SYSTOLIC BLOOD PRESSURE: 128 MMHG | BODY MASS INDEX: 24.38 KG/M2 | OXYGEN SATURATION: 97 % | WEIGHT: 132.5 LBS | HEIGHT: 62 IN | HEART RATE: 65 BPM | DIASTOLIC BLOOD PRESSURE: 80 MMHG

## 2022-06-15 DIAGNOSIS — E78.5 HYPERLIPIDEMIA, UNSPECIFIED HYPERLIPIDEMIA TYPE: ICD-10-CM

## 2022-06-15 DIAGNOSIS — Z00.00 ENCOUNTER FOR PREVENTIVE HEALTH EXAMINATION: Primary | ICD-10-CM

## 2022-06-15 PROCEDURE — 1126F PR PAIN SEVERITY QUANTIFIED, NO PAIN PRESENT: ICD-10-PCS | Mod: CPTII,S$GLB,, | Performed by: NURSE PRACTITIONER

## 2022-06-15 PROCEDURE — 3008F PR BODY MASS INDEX (BMI) DOCUMENTED: ICD-10-PCS | Mod: CPTII,S$GLB,, | Performed by: NURSE PRACTITIONER

## 2022-06-15 PROCEDURE — 1160F RVW MEDS BY RX/DR IN RCRD: CPT | Mod: CPTII,S$GLB,, | Performed by: NURSE PRACTITIONER

## 2022-06-15 PROCEDURE — 99999 PR PBB SHADOW E&M-EST. PATIENT-LVL IV: ICD-10-PCS | Mod: PBBFAC,,, | Performed by: NURSE PRACTITIONER

## 2022-06-15 PROCEDURE — 1101F PT FALLS ASSESS-DOCD LE1/YR: CPT | Mod: CPTII,S$GLB,, | Performed by: NURSE PRACTITIONER

## 2022-06-15 PROCEDURE — 1126F AMNT PAIN NOTED NONE PRSNT: CPT | Mod: CPTII,S$GLB,, | Performed by: NURSE PRACTITIONER

## 2022-06-15 PROCEDURE — 99999 PR PBB SHADOW E&M-EST. PATIENT-LVL IV: CPT | Mod: PBBFAC,,, | Performed by: NURSE PRACTITIONER

## 2022-06-15 PROCEDURE — 3288F PR FALLS RISK ASSESSMENT DOCUMENTED: ICD-10-PCS | Mod: CPTII,S$GLB,, | Performed by: NURSE PRACTITIONER

## 2022-06-15 PROCEDURE — 3008F BODY MASS INDEX DOCD: CPT | Mod: CPTII,S$GLB,, | Performed by: NURSE PRACTITIONER

## 2022-06-15 PROCEDURE — 1101F PR PT FALLS ASSESS DOC 0-1 FALLS W/OUT INJ PAST YR: ICD-10-PCS | Mod: CPTII,S$GLB,, | Performed by: NURSE PRACTITIONER

## 2022-06-15 PROCEDURE — G0439 PR MEDICARE ANNUAL WELLNESS SUBSEQUENT VISIT: ICD-10-PCS | Mod: S$GLB,,, | Performed by: NURSE PRACTITIONER

## 2022-06-15 PROCEDURE — G0439 PPPS, SUBSEQ VISIT: HCPCS | Mod: S$GLB,,, | Performed by: NURSE PRACTITIONER

## 2022-06-15 PROCEDURE — 3288F FALL RISK ASSESSMENT DOCD: CPT | Mod: CPTII,S$GLB,, | Performed by: NURSE PRACTITIONER

## 2022-06-15 PROCEDURE — 1159F MED LIST DOCD IN RCRD: CPT | Mod: CPTII,S$GLB,, | Performed by: NURSE PRACTITIONER

## 2022-06-15 PROCEDURE — 1159F PR MEDICATION LIST DOCUMENTED IN MEDICAL RECORD: ICD-10-PCS | Mod: CPTII,S$GLB,, | Performed by: NURSE PRACTITIONER

## 2022-06-15 PROCEDURE — 1160F PR REVIEW ALL MEDS BY PRESCRIBER/CLIN PHARMACIST DOCUMENTED: ICD-10-PCS | Mod: CPTII,S$GLB,, | Performed by: NURSE PRACTITIONER

## 2022-06-15 RX ORDER — MULTIVITAMIN
1 TABLET ORAL DAILY
COMMUNITY

## 2022-06-15 NOTE — PATIENT INSTRUCTIONS
Counseling and Referral of Other Preventative  (Italic type indicates deductible and co-insurance are waived)    Patient Name: Diann Wasserman  Today's Date: 6/15/2022    Health Maintenance       Date Due Completion Date    COVID-19 Vaccine (1) Never done ---    TETANUS VACCINE Never done ---    DEXA Scan Never done ---    Shingles Vaccine (1 of 2) Never done ---    Pneumococcal Vaccines (Age 65+) (1 - PCV) Never done ---    Mammogram 11/30/2021 11/30/2020    Influenza Vaccine (Season Ended) 09/01/2022 11/18/2019    Colorectal Cancer Screening 04/06/2026 4/6/2016 (Done)    Override on 4/6/2016: Done    Lipid Panel 10/27/2026 10/27/2021        No orders of the defined types were placed in this encounter.    The following information is provided to all patients.  This information is to help you find resources for any of the problems found today that may be affecting your health:                Living healthy guide: www.[facility].[place].[facility]      Understanding Diabetes: www.diabetes.org      Eating healthy: www.cdc.gov/healthyweight      [facility] home safety checklist: www.cdc.gov/steadi/patient.html      Agency on Aging: www.goea.[place]      Alcoholics anonymous (AA): www.aa.org      Physical Activity: www.[place].nih.gov/jr8bvkr      Tobacco use: www.quitwithusla.org     Counseling and Referral of Other Preventative  (Italic type indicates deductible and co-insurance are waived)    Patient Name: Diann Wasserman  Today's Date: 6/15/2022    Health Maintenance       Date Due Completion Date    COVID-19 Vaccine (1) Never done ---    TETANUS VACCINE Never done ---    DEXA Scan Never done ---    Shingles Vaccine (1 of 2) Never done ---    Pneumococcal Vaccines (Age 65+) (1 - PCV) Never done ---    Mammogram 11/30/2021 11/30/2020    Influenza Vaccine (Season Ended) 09/01/2022 11/18/2019    Colorectal Cancer Screening 04/06/2026 4/6/2016 (Done)    Override on 4/6/2016: Done    Lipid Panel 10/27/2026 10/27/2021         No orders of the defined types were placed in this encounter.    The following information is provided to all patients.  This information is to help you find resources for any of the problems found today that may be affecting your health:                Living healthy guide: www.[facility].[place].[facility]      Understanding Diabetes: www.diabetes.org      Eating healthy: www.cdc.gov/healthyweight      [facility] home safety checklist: www.cdc.gov/steadi/patient.html      Agency on Aging: www.goea.[place]      Alcoholics anonymous (AA): www.aa.org      Physical Activity: www.[place].nih.gov/ct0bdkj      Tobacco use: www.quitwithusla.org

## 2022-06-15 NOTE — PROGRESS NOTES
"  Diann Wasserman presented for a  Medicare AWV and comprehensive Health Risk Assessment today. The following components were reviewed and updated:    · Medical history  · Family History  · Social history  · Allergies and Current Medications  · Health Risk Assessment  · Health Maintenance  · Care Team         ** See Completed Assessments for Annual Wellness Visit within the encounter summary.**         The following assessments were completed:  · Living Situation  · CAGE  · Depression Screening  · Timed Get Up and Go  · Whisper Test  · Cognitive Function Screening      ·   · Nutrition Screening  · ADL Screening  · PAQ Screening        Vitals:    06/15/22 1032 06/15/22 1039   BP:  128/80   BP Location:  Right arm   Patient Position:  Sitting   Pulse:  65   SpO2:  97%   Weight: 60.1 kg (132 lb 7.9 oz)    Height: 5' 2" (1.575 m)      Body mass index is 24.23 kg/m².  Physical Exam  Vitals and nursing note reviewed.   Constitutional:       Appearance: She is well-developed.   HENT:      Head: Normocephalic.   Cardiovascular:      Rate and Rhythm: Normal rate and regular rhythm.   Pulmonary:      Effort: Pulmonary effort is normal.      Breath sounds: Normal breath sounds.   Abdominal:      General: Bowel sounds are normal.      Palpations: Abdomen is soft.   Musculoskeletal:         General: Normal range of motion.   Skin:     General: Skin is warm and dry.   Neurological:      Mental Status: She is alert and oriented to person, place, and time.      Motor: No abnormal muscle tone.   Psychiatric:         Mood and Affect: Mood normal.               Diagnoses and health risks identified today and associated recommendations/orders:    1. Encounter for preventive health examination  Here for Health Risk Assessment/Annual Wellness Visit.  Health maintenance reviewed and updated. Follow up in one year.  Declined Covid vaccine  Prescription given for PSV 20, Shingrix    2. Hyperlipidemia, unspecified hyperlipidemia " type  Chronic, stable with diet. Followed by PCP.      Provided Diann with a 5-10 year written screening schedule and personal prevention plan. Recommendations were developed using the USPSTF age appropriate recommendations. Education, counseling, and referrals were provided as needed. After Visit Summary printed and given to patient which includes a list of additional screenings\tests needed.    Follow up in 5 months (on 11/4/2022).with PCP    Lindsay Beth NP

## 2022-06-29 ENCOUNTER — HOSPITAL ENCOUNTER (OUTPATIENT)
Dept: RADIOLOGY | Facility: CLINIC | Age: 67
Discharge: HOME | End: 2022-06-29
Attending: INTERNAL MEDICINE
Payer: MEDICARE

## 2022-06-29 DIAGNOSIS — Z78.0 ASYMPTOMATIC MENOPAUSAL STATE: ICD-10-CM

## 2022-06-29 PROCEDURE — 77080 DEXA BONE DENSITY SPINE HIP: ICD-10-PCS | Mod: 26,,, | Performed by: INTERNAL MEDICINE

## 2022-06-29 PROCEDURE — 77080 DXA BONE DENSITY AXIAL: CPT | Mod: 26,,, | Performed by: INTERNAL MEDICINE

## 2022-06-29 PROCEDURE — 77080 DXA BONE DENSITY AXIAL: CPT | Mod: TC

## 2022-08-02 ENCOUNTER — PATIENT MESSAGE (OUTPATIENT)
Dept: OPTOMETRY | Facility: CLINIC | Age: 67
End: 2022-08-02
Payer: MEDICARE

## 2022-08-17 ENCOUNTER — HOSPITAL ENCOUNTER (OUTPATIENT)
Dept: RADIOLOGY | Facility: HOSPITAL | Age: 67
Discharge: HOME | End: 2022-08-17
Attending: INTERNAL MEDICINE
Payer: MEDICARE

## 2022-08-17 DIAGNOSIS — Z12.31 ENCOUNTER FOR SCREENING MAMMOGRAM FOR BREAST CANCER: ICD-10-CM

## 2022-08-17 PROCEDURE — 77067 MAMMO DIGITAL SCREENING BILAT WITH TOMO: ICD-10-PCS | Mod: 26,,, | Performed by: RADIOLOGY

## 2022-08-17 PROCEDURE — 77063 BREAST TOMOSYNTHESIS BI: CPT | Mod: 26,,, | Performed by: RADIOLOGY

## 2022-08-17 PROCEDURE — 77067 SCR MAMMO BI INCL CAD: CPT | Mod: 26,,, | Performed by: RADIOLOGY

## 2022-08-17 PROCEDURE — 77063 MAMMO DIGITAL SCREENING BILAT WITH TOMO: ICD-10-PCS | Mod: 26,,, | Performed by: RADIOLOGY

## 2022-08-17 PROCEDURE — 77063 BREAST TOMOSYNTHESIS BI: CPT | Mod: TC

## 2022-11-02 ENCOUNTER — LAB VISIT (OUTPATIENT)
Dept: LAB | Facility: HOSPITAL | Age: 67
End: 2022-11-02
Attending: INTERNAL MEDICINE
Payer: MEDICARE

## 2022-11-02 DIAGNOSIS — E78.5 HYPERLIPIDEMIA, UNSPECIFIED HYPERLIPIDEMIA TYPE: ICD-10-CM

## 2022-11-02 LAB
BASOPHILS # BLD AUTO: 0.04 K/UL (ref 0–0.2)
BASOPHILS NFR BLD: 0.8 % (ref 0–1.9)
CHOLEST SERPL-MCNC: 207 MG/DL (ref 120–199)
CHOLEST/HDLC SERPL: 2.6 {RATIO} (ref 2–5)
DIFFERENTIAL METHOD: NORMAL
EOSINOPHIL # BLD AUTO: 0.1 K/UL (ref 0–0.5)
EOSINOPHIL NFR BLD: 2.8 % (ref 0–8)
ERYTHROCYTE [DISTWIDTH] IN BLOOD BY AUTOMATED COUNT: 12.7 % (ref 11.5–14.5)
HCT VFR BLD AUTO: 38 % (ref 37–48.5)
HDLC SERPL-MCNC: 79 MG/DL (ref 40–75)
HDLC SERPL: 38.2 % (ref 20–50)
HGB BLD-MCNC: 12.4 G/DL (ref 12–16)
IMM GRANULOCYTES # BLD AUTO: 0 K/UL (ref 0–0.04)
IMM GRANULOCYTES NFR BLD AUTO: 0 % (ref 0–0.5)
LDLC SERPL CALC-MCNC: 119.2 MG/DL (ref 63–159)
LYMPHOCYTES # BLD AUTO: 2.3 K/UL (ref 1–4.8)
LYMPHOCYTES NFR BLD: 45.5 % (ref 18–48)
MCH RBC QN AUTO: 30.1 PG (ref 27–31)
MCHC RBC AUTO-ENTMCNC: 32.6 G/DL (ref 32–36)
MCV RBC AUTO: 92 FL (ref 82–98)
MONOCYTES # BLD AUTO: 0.5 K/UL (ref 0.3–1)
MONOCYTES NFR BLD: 9.7 % (ref 4–15)
NEUTROPHILS # BLD AUTO: 2 K/UL (ref 1.8–7.7)
NEUTROPHILS NFR BLD: 41.2 % (ref 38–73)
NONHDLC SERPL-MCNC: 128 MG/DL
NRBC BLD-RTO: 0 /100 WBC
PLATELET # BLD AUTO: 290 K/UL (ref 150–450)
PMV BLD AUTO: 10.1 FL (ref 9.2–12.9)
RBC # BLD AUTO: 4.12 M/UL (ref 4–5.4)
TRIGL SERPL-MCNC: 44 MG/DL (ref 30–150)
WBC # BLD AUTO: 4.94 K/UL (ref 3.9–12.7)

## 2022-11-02 PROCEDURE — 80061 LIPID PANEL: CPT | Performed by: INTERNAL MEDICINE

## 2022-11-02 PROCEDURE — 85025 COMPLETE CBC W/AUTO DIFF WBC: CPT | Performed by: INTERNAL MEDICINE

## 2022-11-02 PROCEDURE — 36415 COLL VENOUS BLD VENIPUNCTURE: CPT | Performed by: INTERNAL MEDICINE

## 2022-11-04 ENCOUNTER — OFFICE VISIT (OUTPATIENT)
Dept: INTERNAL MEDICINE | Facility: CLINIC | Age: 67
End: 2022-11-04
Payer: MEDICARE

## 2022-11-04 ENCOUNTER — LAB VISIT (OUTPATIENT)
Dept: LAB | Facility: HOSPITAL | Age: 67
End: 2022-11-04
Attending: INTERNAL MEDICINE
Payer: MEDICARE

## 2022-11-04 VITALS
HEART RATE: 72 BPM | OXYGEN SATURATION: 98 % | DIASTOLIC BLOOD PRESSURE: 78 MMHG | HEIGHT: 62 IN | SYSTOLIC BLOOD PRESSURE: 126 MMHG | WEIGHT: 128.5 LBS | BODY MASS INDEX: 23.65 KG/M2

## 2022-11-04 DIAGNOSIS — E78.5 BORDERLINE HYPERLIPIDEMIA: Primary | ICD-10-CM

## 2022-11-04 DIAGNOSIS — E78.5 HYPERLIPIDEMIA, UNSPECIFIED HYPERLIPIDEMIA TYPE: ICD-10-CM

## 2022-11-04 LAB
ALBUMIN SERPL BCP-MCNC: 4.4 G/DL (ref 3.5–5.2)
ALP SERPL-CCNC: 59 U/L (ref 55–135)
ALT SERPL W/O P-5'-P-CCNC: 15 U/L (ref 10–44)
ANION GAP SERPL CALC-SCNC: 11 MMOL/L (ref 8–16)
AST SERPL-CCNC: 17 U/L (ref 10–40)
BILIRUB SERPL-MCNC: 0.4 MG/DL (ref 0.1–1)
BUN SERPL-MCNC: 15 MG/DL (ref 8–23)
CALCIUM SERPL-MCNC: 9.9 MG/DL (ref 8.7–10.5)
CHLORIDE SERPL-SCNC: 103 MMOL/L (ref 95–110)
CO2 SERPL-SCNC: 26 MMOL/L (ref 23–29)
CREAT SERPL-MCNC: 0.8 MG/DL (ref 0.5–1.4)
EST. GFR  (NO RACE VARIABLE): >60 ML/MIN/1.73 M^2
GLUCOSE SERPL-MCNC: 89 MG/DL (ref 70–110)
POTASSIUM SERPL-SCNC: 5.1 MMOL/L (ref 3.5–5.1)
PROT SERPL-MCNC: 7.7 G/DL (ref 6–8.4)
SODIUM SERPL-SCNC: 140 MMOL/L (ref 136–145)

## 2022-11-04 PROCEDURE — 1101F PT FALLS ASSESS-DOCD LE1/YR: CPT | Mod: CPTII,S$GLB,, | Performed by: INTERNAL MEDICINE

## 2022-11-04 PROCEDURE — 1159F PR MEDICATION LIST DOCUMENTED IN MEDICAL RECORD: ICD-10-PCS | Mod: CPTII,S$GLB,, | Performed by: INTERNAL MEDICINE

## 2022-11-04 PROCEDURE — 1126F PR PAIN SEVERITY QUANTIFIED, NO PAIN PRESENT: ICD-10-PCS | Mod: CPTII,S$GLB,, | Performed by: INTERNAL MEDICINE

## 2022-11-04 PROCEDURE — 1101F PR PT FALLS ASSESS DOC 0-1 FALLS W/OUT INJ PAST YR: ICD-10-PCS | Mod: CPTII,S$GLB,, | Performed by: INTERNAL MEDICINE

## 2022-11-04 PROCEDURE — 36415 COLL VENOUS BLD VENIPUNCTURE: CPT | Performed by: INTERNAL MEDICINE

## 2022-11-04 PROCEDURE — 1126F AMNT PAIN NOTED NONE PRSNT: CPT | Mod: CPTII,S$GLB,, | Performed by: INTERNAL MEDICINE

## 2022-11-04 PROCEDURE — 99999 PR PBB SHADOW E&M-EST. PATIENT-LVL III: CPT | Mod: PBBFAC,,, | Performed by: INTERNAL MEDICINE

## 2022-11-04 PROCEDURE — 3288F FALL RISK ASSESSMENT DOCD: CPT | Mod: CPTII,S$GLB,, | Performed by: INTERNAL MEDICINE

## 2022-11-04 PROCEDURE — 1160F PR REVIEW ALL MEDS BY PRESCRIBER/CLIN PHARMACIST DOCUMENTED: ICD-10-PCS | Mod: CPTII,S$GLB,, | Performed by: INTERNAL MEDICINE

## 2022-11-04 PROCEDURE — 80053 COMPREHEN METABOLIC PANEL: CPT | Performed by: INTERNAL MEDICINE

## 2022-11-04 PROCEDURE — 99214 OFFICE O/P EST MOD 30 MIN: CPT | Mod: S$GLB,,, | Performed by: INTERNAL MEDICINE

## 2022-11-04 PROCEDURE — 1160F RVW MEDS BY RX/DR IN RCRD: CPT | Mod: CPTII,S$GLB,, | Performed by: INTERNAL MEDICINE

## 2022-11-04 PROCEDURE — 99999 PR PBB SHADOW E&M-EST. PATIENT-LVL III: ICD-10-PCS | Mod: PBBFAC,,, | Performed by: INTERNAL MEDICINE

## 2022-11-04 PROCEDURE — 3288F PR FALLS RISK ASSESSMENT DOCUMENTED: ICD-10-PCS | Mod: CPTII,S$GLB,, | Performed by: INTERNAL MEDICINE

## 2022-11-04 PROCEDURE — 99214 PR OFFICE/OUTPT VISIT, EST, LEVL IV, 30-39 MIN: ICD-10-PCS | Mod: S$GLB,,, | Performed by: INTERNAL MEDICINE

## 2022-11-04 PROCEDURE — 3008F BODY MASS INDEX DOCD: CPT | Mod: CPTII,S$GLB,, | Performed by: INTERNAL MEDICINE

## 2022-11-04 PROCEDURE — 3078F DIAST BP <80 MM HG: CPT | Mod: CPTII,S$GLB,, | Performed by: INTERNAL MEDICINE

## 2022-11-04 PROCEDURE — 3074F SYST BP LT 130 MM HG: CPT | Mod: CPTII,S$GLB,, | Performed by: INTERNAL MEDICINE

## 2022-11-04 PROCEDURE — 3074F PR MOST RECENT SYSTOLIC BLOOD PRESSURE < 130 MM HG: ICD-10-PCS | Mod: CPTII,S$GLB,, | Performed by: INTERNAL MEDICINE

## 2022-11-04 PROCEDURE — 3008F PR BODY MASS INDEX (BMI) DOCUMENTED: ICD-10-PCS | Mod: CPTII,S$GLB,, | Performed by: INTERNAL MEDICINE

## 2022-11-04 PROCEDURE — 3078F PR MOST RECENT DIASTOLIC BLOOD PRESSURE < 80 MM HG: ICD-10-PCS | Mod: CPTII,S$GLB,, | Performed by: INTERNAL MEDICINE

## 2022-11-04 PROCEDURE — 1159F MED LIST DOCD IN RCRD: CPT | Mod: CPTII,S$GLB,, | Performed by: INTERNAL MEDICINE

## 2022-11-04 NOTE — PROGRESS NOTES
"Subjective:       Patient ID: iDann Wasserman is a 67 y.o. female.    Chief Complaint: Follow-up (6 mth follow up )    HPI  67 y.o. female here for follow up of    HLD, not on meds   recent panel:    Hdl 79    The 10-year ASCVD risk score (Nilesh BARRETO, et al., 2019) is: 6.8%    Values used to calculate the score:      Age: 67 years      Sex: Female      Is Non- : No      Diabetic: No      Tobacco smoker: No      Systolic Blood Pressure: 126 mmHg      Is BP treated: No      HDL Cholesterol: 79 mg/dL      Total Cholesterol: 207 mg/dL    Mmg aug 2022 wnl  Dexa June 2022 osteopenia, bisphosphonate not recommended    Feeling well GI wise. Watching what she eats. Has switched to almond milk from regular.   Review of Systems   Constitutional:  Negative for fever.   HENT: Negative.  Negative for hearing loss.    Eyes: Negative.  Negative for visual disturbance.   Respiratory:  Negative for shortness of breath.    Cardiovascular:  Negative for chest pain and leg swelling.   Gastrointestinal:  Negative for abdominal pain, diarrhea, nausea and vomiting.   Genitourinary: Negative.    Musculoskeletal:  Negative for arthralgias and myalgias.   Skin:  Negative for rash.   Neurological:  Negative for weakness and numbness.   Psychiatric/Behavioral: Negative.       Objective:   /78 (BP Location: Left arm, Patient Position: Sitting, BP Method: Medium (Manual))   Pulse 72   Ht 5' 2" (1.575 m)   Wt 58.3 kg (128 lb 8.5 oz)   SpO2 98%   BMI 23.51 kg/m²      Physical Exam  Vitals reviewed.   Constitutional:       Appearance: She is well-developed.   HENT:      Head: Normocephalic and atraumatic.   Eyes:      Conjunctiva/sclera: Conjunctivae normal.      Pupils: Pupils are equal, round, and reactive to light.   Neck:      Thyroid: No thyromegaly.   Cardiovascular:      Rate and Rhythm: Normal rate and regular rhythm.      Heart sounds: Normal heart sounds. No murmur heard.  Pulmonary:    "   Effort: Pulmonary effort is normal. No respiratory distress.      Breath sounds: Normal breath sounds. No wheezing.   Abdominal:      General: There is no distension.      Palpations: Abdomen is soft.      Tenderness: There is no abdominal tenderness. There is no rebound.   Musculoskeletal:         General: No swelling or deformity. Normal range of motion.      Cervical back: Neck supple.   Lymphadenopathy:      Cervical: No cervical adenopathy.   Skin:     General: Skin is warm and dry.      Findings: No rash.   Neurological:      Mental Status: She is alert and oriented to person, place, and time.   Psychiatric:         Thought Content: Thought content normal.         Judgment: Judgment normal.       Assessment:       1. Borderline hyperlipidemia        Plan:       Diann was seen today for follow-up.    Diagnoses and all orders for this visit:    Borderline hyperlipidemia  -     Comprehensive Metabolic Panel; Future  -     Lipid Panel; Future  -     CBC Auto Differential; Future      Cmp today; labs in a year with follow up afterwards  Covid, flu, tdap, shingrx recommended - declined by pt  Colonoscopy done 4/2016; repeat 10 years

## 2022-11-22 ENCOUNTER — OFFICE VISIT (OUTPATIENT)
Dept: URGENT CARE | Facility: CLINIC | Age: 67
End: 2022-11-22
Payer: MEDICARE

## 2022-11-22 VITALS
SYSTOLIC BLOOD PRESSURE: 145 MMHG | OXYGEN SATURATION: 99 % | HEART RATE: 87 BPM | RESPIRATION RATE: 15 BRPM | BODY MASS INDEX: 23.19 KG/M2 | TEMPERATURE: 99 F | DIASTOLIC BLOOD PRESSURE: 81 MMHG | HEIGHT: 62 IN | WEIGHT: 126 LBS

## 2022-11-22 DIAGNOSIS — U07.1 COVID: Primary | ICD-10-CM

## 2022-11-22 DIAGNOSIS — G44.219 EPISODIC TENSION-TYPE HEADACHE, NOT INTRACTABLE: ICD-10-CM

## 2022-11-22 LAB
CTP QC/QA: YES
CTP QC/QA: YES
POC MOLECULAR INFLUENZA A AGN: NEGATIVE
POC MOLECULAR INFLUENZA B AGN: NEGATIVE
SARS-COV-2 RDRP RESP QL NAA+PROBE: POSITIVE

## 2022-11-22 PROCEDURE — 1160F RVW MEDS BY RX/DR IN RCRD: CPT | Mod: CPTII,S$GLB,, | Performed by: NURSE PRACTITIONER

## 2022-11-22 PROCEDURE — 96372 PR INJECTION,THERAP/PROPH/DIAG2ST, IM OR SUBCUT: ICD-10-PCS | Mod: S$GLB,,, | Performed by: FAMILY MEDICINE

## 2022-11-22 PROCEDURE — 1125F AMNT PAIN NOTED PAIN PRSNT: CPT | Mod: CPTII,S$GLB,, | Performed by: NURSE PRACTITIONER

## 2022-11-22 PROCEDURE — 87635: ICD-10-PCS | Mod: QW,S$GLB,, | Performed by: NURSE PRACTITIONER

## 2022-11-22 PROCEDURE — 99203 OFFICE O/P NEW LOW 30 MIN: CPT | Mod: 25,S$GLB,, | Performed by: NURSE PRACTITIONER

## 2022-11-22 PROCEDURE — 3008F BODY MASS INDEX DOCD: CPT | Mod: CPTII,S$GLB,, | Performed by: NURSE PRACTITIONER

## 2022-11-22 PROCEDURE — 1159F PR MEDICATION LIST DOCUMENTED IN MEDICAL RECORD: ICD-10-PCS | Mod: CPTII,S$GLB,, | Performed by: NURSE PRACTITIONER

## 2022-11-22 PROCEDURE — 87502 INFLUENZA DNA AMP PROBE: CPT | Mod: QW,S$GLB,, | Performed by: NURSE PRACTITIONER

## 2022-11-22 PROCEDURE — 87635 SARS-COV-2 COVID-19 AMP PRB: CPT | Mod: QW,S$GLB,, | Performed by: NURSE PRACTITIONER

## 2022-11-22 PROCEDURE — 3077F PR MOST RECENT SYSTOLIC BLOOD PRESSURE >= 140 MM HG: ICD-10-PCS | Mod: CPTII,S$GLB,, | Performed by: NURSE PRACTITIONER

## 2022-11-22 PROCEDURE — 3079F PR MOST RECENT DIASTOLIC BLOOD PRESSURE 80-89 MM HG: ICD-10-PCS | Mod: CPTII,S$GLB,, | Performed by: NURSE PRACTITIONER

## 2022-11-22 PROCEDURE — 3079F DIAST BP 80-89 MM HG: CPT | Mod: CPTII,S$GLB,, | Performed by: NURSE PRACTITIONER

## 2022-11-22 PROCEDURE — 87502 POCT INFLUENZA A/B MOLECULAR: ICD-10-PCS | Mod: QW,S$GLB,, | Performed by: NURSE PRACTITIONER

## 2022-11-22 PROCEDURE — 1159F MED LIST DOCD IN RCRD: CPT | Mod: CPTII,S$GLB,, | Performed by: NURSE PRACTITIONER

## 2022-11-22 PROCEDURE — 3077F SYST BP >= 140 MM HG: CPT | Mod: CPTII,S$GLB,, | Performed by: NURSE PRACTITIONER

## 2022-11-22 PROCEDURE — 99203 PR OFFICE/OUTPT VISIT, NEW, LEVL III, 30-44 MIN: ICD-10-PCS | Mod: 25,S$GLB,, | Performed by: NURSE PRACTITIONER

## 2022-11-22 PROCEDURE — 1125F PR PAIN SEVERITY QUANTIFIED, PAIN PRESENT: ICD-10-PCS | Mod: CPTII,S$GLB,, | Performed by: NURSE PRACTITIONER

## 2022-11-22 PROCEDURE — 3008F PR BODY MASS INDEX (BMI) DOCUMENTED: ICD-10-PCS | Mod: CPTII,S$GLB,, | Performed by: NURSE PRACTITIONER

## 2022-11-22 PROCEDURE — 96372 THER/PROPH/DIAG INJ SC/IM: CPT | Mod: S$GLB,,, | Performed by: FAMILY MEDICINE

## 2022-11-22 PROCEDURE — 1160F PR REVIEW ALL MEDS BY PRESCRIBER/CLIN PHARMACIST DOCUMENTED: ICD-10-PCS | Mod: CPTII,S$GLB,, | Performed by: NURSE PRACTITIONER

## 2022-11-22 RX ORDER — KETOROLAC TROMETHAMINE 30 MG/ML
30 INJECTION, SOLUTION INTRAMUSCULAR; INTRAVENOUS
Status: COMPLETED | OUTPATIENT
Start: 2022-11-22 | End: 2022-11-22

## 2022-11-22 RX ADMIN — KETOROLAC TROMETHAMINE 30 MG: 30 INJECTION, SOLUTION INTRAMUSCULAR; INTRAVENOUS at 05:11

## 2022-11-22 NOTE — PROGRESS NOTES
"Subjective:       Patient ID: Diann Wasserman is a 67 y.o. female.    Vitals:  height is 5' 2" (1.575 m) and weight is 57.2 kg (126 lb). Her oral temperature is 99.3 °F (37.4 °C). Her blood pressure is 145/81 (abnormal) and her pulse is 87. Her respiration is 15 and oxygen saturation is 99%.     Chief Complaint: Headache    68 y/o female presents to  today with c/o significant headache that began yesterday. She is also experiencing facial pain-likely from sinus pressure. Pt reports a low grade fever, yesterday, as well. She has taken tylenol with mild relief. Denies cough, chest pain, SOB, wheeze, n/v/d.     Headache   This is a new problem. The current episode started yesterday. The problem occurs constantly. The problem has been gradually worsening. The pain is located in the Frontal region. The pain radiates to the face. The pain quality is not similar to prior headaches. The quality of the pain is described as aching. The pain is at a severity of 8/10. The pain is moderate. Associated symptoms include a fever and sinus pressure. Pertinent negatives include no abnormal behavior, anorexia, drainage, ear pain, eye pain, hearing loss, insomnia, muscle aches, scalp tenderness, tingling or tinnitus. Nothing aggravates the symptoms. She has tried acetaminophen for the symptoms. The treatment provided moderate relief. There is no history of migraine headaches, migraines in the family, obesity, pseudotumor cerebri, sinus disease or TMJ.     Constitution: Positive for fever.   HENT:  Positive for sinus pressure. Negative for ear pain, tinnitus and hearing loss.    Eyes:  Negative for eye pain.   Neurological:  Positive for headaches. Negative for history of migraines.   Psychiatric/Behavioral:  The patient does not have insomnia.      Objective:      Physical Exam   Constitutional: She is oriented to person, place, and time. She appears well-developed. She is cooperative.  Non-toxic appearance. She does not " appear ill. No distress.   HENT:   Head: Normocephalic.   Ears:   Right Ear: Hearing, tympanic membrane, external ear and ear canal normal.   Left Ear: Hearing, tympanic membrane, external ear and ear canal normal.   Nose: Congestion present. No mucosal edema, rhinorrhea or nasal deformity. No epistaxis. Right sinus exhibits maxillary sinus tenderness and frontal sinus tenderness. Left sinus exhibits maxillary sinus tenderness and frontal sinus tenderness.   Mouth/Throat: Uvula is midline, oropharynx is clear and moist and mucous membranes are normal. Mucous membranes are moist. No trismus in the jaw. Normal dentition. No uvula swelling. No oropharyngeal exudate, posterior oropharyngeal edema or posterior oropharyngeal erythema. Oropharynx is clear.   Eyes: Lids are normal. No scleral icterus.   Neck: Trachea normal and phonation normal. Neck supple. No edema present. No erythema present. No neck rigidity present.   Cardiovascular: Normal rate, regular rhythm and normal heart sounds.   Pulmonary/Chest: Effort normal and breath sounds normal. No respiratory distress. She has no decreased breath sounds. She has no rhonchi.   Abdominal: Normal appearance.   Musculoskeletal: Normal range of motion.         General: No deformity. Normal range of motion.   Neurological: She is alert and oriented to person, place, and time. She exhibits normal muscle tone. Coordination normal.   Skin: Skin is warm, dry, intact, not diaphoretic and not pale.   Psychiatric: Her speech is normal and behavior is normal. Judgment and thought content normal.   Nursing note and vitals reviewed.      Results for orders placed or performed in visit on 11/22/22   POCT Influenza A/B MOLECULAR   Result Value Ref Range    POC Molecular Influenza A Ag Negative Negative, Not Reported    POC Molecular Influenza B Ag Negative Negative, Not Reported     Acceptable Yes    POCT COVID-19 Rapid Screening   Result Value Ref Range    POC Rapid  COVID Positive (A) Negative     Acceptable Yes       Assessment:       1. COVID    2. Episodic tension-type headache, not intractable            Plan:       Declines paxlovid at this time. Should you change your mind, please call the clinic and we can send it to your pharmacy     COVID  -     POCT Influenza A/B MOLECULAR  -     POCT COVID-19 Rapid Screening    Episodic tension-type headache, not intractable  -     ketorolac injection 30 mg       Patient Instructions   Oral fluids  Rest  Steam (hot showers, hot tea)  Blow nose often  Droplet and contact precautions  Self quarantine x 5 days-ok to come out of quarantine as long as symptoms are improving on day 6  Continue to wear mask for 10 days  Follow up with worsening symptoms  Seek ER care with symptoms such as wheeze, respiratory distress, lethargy, dehydration

## 2023-02-23 ENCOUNTER — OFFICE VISIT (OUTPATIENT)
Dept: OPTOMETRY | Facility: CLINIC | Age: 68
End: 2023-02-23
Payer: MEDICARE

## 2023-02-23 DIAGNOSIS — H52.03 HYPEROPIA OF BOTH EYES WITH ASTIGMATISM: ICD-10-CM

## 2023-02-23 DIAGNOSIS — H25.13 NUCLEAR SCLEROSIS OF BOTH EYES: Primary | ICD-10-CM

## 2023-02-23 DIAGNOSIS — D31.42 IRIS NEVUS, LEFT: ICD-10-CM

## 2023-02-23 DIAGNOSIS — H52.4 PRESBYOPIA OF BOTH EYES: ICD-10-CM

## 2023-02-23 DIAGNOSIS — H52.203 HYPEROPIA OF BOTH EYES WITH ASTIGMATISM: ICD-10-CM

## 2023-02-23 PROCEDURE — 92014 COMPRE OPH EXAM EST PT 1/>: CPT | Mod: S$GLB,,, | Performed by: OPTOMETRIST

## 2023-02-23 PROCEDURE — 1101F PT FALLS ASSESS-DOCD LE1/YR: CPT | Mod: CPTII,S$GLB,, | Performed by: OPTOMETRIST

## 2023-02-23 PROCEDURE — 1101F PR PT FALLS ASSESS DOC 0-1 FALLS W/OUT INJ PAST YR: ICD-10-PCS | Mod: CPTII,S$GLB,, | Performed by: OPTOMETRIST

## 2023-02-23 PROCEDURE — 92014 PR EYE EXAM, EST PATIENT,COMPREHESV: ICD-10-PCS | Mod: S$GLB,,, | Performed by: OPTOMETRIST

## 2023-02-23 PROCEDURE — 1126F AMNT PAIN NOTED NONE PRSNT: CPT | Mod: CPTII,S$GLB,, | Performed by: OPTOMETRIST

## 2023-02-23 PROCEDURE — 99999 PR PBB SHADOW E&M-EST. PATIENT-LVL III: CPT | Mod: PBBFAC,,, | Performed by: OPTOMETRIST

## 2023-02-23 PROCEDURE — 92015 DETERMINE REFRACTIVE STATE: CPT | Mod: S$GLB,,, | Performed by: OPTOMETRIST

## 2023-02-23 PROCEDURE — 3288F PR FALLS RISK ASSESSMENT DOCUMENTED: ICD-10-PCS | Mod: CPTII,S$GLB,, | Performed by: OPTOMETRIST

## 2023-02-23 PROCEDURE — 92015 PR REFRACTION: ICD-10-PCS | Mod: S$GLB,,, | Performed by: OPTOMETRIST

## 2023-02-23 PROCEDURE — 99999 PR PBB SHADOW E&M-EST. PATIENT-LVL III: ICD-10-PCS | Mod: PBBFAC,,, | Performed by: OPTOMETRIST

## 2023-02-23 PROCEDURE — 3288F FALL RISK ASSESSMENT DOCD: CPT | Mod: CPTII,S$GLB,, | Performed by: OPTOMETRIST

## 2023-02-23 PROCEDURE — 1159F PR MEDICATION LIST DOCUMENTED IN MEDICAL RECORD: ICD-10-PCS | Mod: CPTII,S$GLB,, | Performed by: OPTOMETRIST

## 2023-02-23 PROCEDURE — 1126F PR PAIN SEVERITY QUANTIFIED, NO PAIN PRESENT: ICD-10-PCS | Mod: CPTII,S$GLB,, | Performed by: OPTOMETRIST

## 2023-02-23 PROCEDURE — 1159F MED LIST DOCD IN RCRD: CPT | Mod: CPTII,S$GLB,, | Performed by: OPTOMETRIST

## 2023-02-23 NOTE — PATIENT INSTRUCTIONS
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    Hyperopia with astigmatism in each  eye, and presbyopia consistent with age.  Best-corrected VA of 20/20 in each eye.  Spectacle lens Rx issued.    Wears SCLs only occasionally.  Came in without CLs in.  Reports no problems with CLs, and showing no adverse effects of CL wear in either eye.  New (duplicate) contact lens Rx issued for Biofinity Toric SCLs.    Iris nevi of the left eye, associated with slightly irregular pupil.  Iris nevi noted previously, without mention of irregular pupil.      Suggest consult regarding iris nevi in the left eye, with secondary irregular pupil.    Generate referral.    Repeat refraction in one year.

## 2023-02-27 NOTE — PROGRESS NOTES
[facility]     eye exam            Comments: Gneral eye exam and refraction   Lenses scratched, but no other problems noted.  Wears glasses full-time, except when she wears her SCLs.  But only wears   SCLs occasionally/socially.  No problems reoported with the CLs           Comments    Patient's age: 67 y.o. WF  Occupation: retired/homemaker   Approximate date of last eye examination:  01/10/2022  Name of last eye doctor seen:     City/State: [facility]   Wears glasses? Yes      If yes, wears  Full-time or part-time?  Full-time   Present glasses are: Bifocal, SV Distance, SV Reading?  Bifocal   Approximate age of present glasses:  1 year    Got new glasses following last exam, or subsequently?:  yes    Any problem with VA with glasses?  no  Wears CLs?:  yes           If yes:              Type of CL worn:                Wears full-time or part-time:  part-time                Sleeps with contact lenses:  no               CL Solution used:  optic- free                How often replace CLs:  not sure               Any problem with VA with CLs?  no               Headaches?  no  Eye pain/discomfort?  no                                                                                     Flashes?  no  Floaters?  no  Diplopia/Double vision?  no  Patient's Ocular History:         Any eye surgeries? no         Any eye injury?  no         Any treatment for eye disease?  no  Family history of eye disease?  no  Significant patient medical history:         1. Diabetes?  no       If yes, IDDM or NIDDM?   N/a   2. HBP?  no                ! OTC eyedrops currently using:  none   ! Prescription eye meds currently using:  no   ! Any history of allergy/adverse reaction to any eye meds used   previously?  no    ! Any history of allergy/adverse reaction to eyedrops used during prior   eye exam(s)? no    ! Any history of allergy/adverse reaction to Novacaine or similar meds?   no   ! Any history of allergy/adverse reaction to Epinephrine or  "similar meds?   no    ! Patient okay with use of anesthetic eyedrops to check eye pressure?    yes         ! Patient okay with use of eyedrops to dilate pupils today?  yes   !  Allergies/Medications/Medical History/Family History reviewed today?    yes      PD =   63/59  Desired reading distance =  17.5"                                                                        Last edited by Felix Lynch, OD on 2/23/2023 10:13 AM.            Assessment /Plan     For exam results, see Encounter Report.    1. Nuclear sclerosis of both eyes        2. Iris nevus, left  Ambulatory referral/consult to Ophthalmology      3. Hyperopia of both eyes with astigmatism        4. Presbyopia of both eyes                         Early nuclear sclerosis of lens of both eyes, and early peripheral cortical cataract in the right eye.    Hyperopia with astigmatism in each  eye, and presbyopia consistent with age.  Best-corrected VA of 20/20 in each eye.  Spectacle lens Rx issued.    Wears SCLs only occasionally.  Came in without CLs in.  Reports no problems with CLs, and showing no adverse effects of CL wear in either eye.  New (duplicate) contact lens Rx issued for [employer] Toric SCLs.    Iris nevi of the left eye, associated with slightly irregular pupil.  Iris nevi noted previously, without mention of irregular pupil.      Suggest consult regarding iris nevi in the left eye, with secondary irregular pupil.    Generate referral.    Repeat refraction in one year.     "

## 2023-03-06 ENCOUNTER — PATIENT MESSAGE (OUTPATIENT)
Dept: OPTOMETRY | Facility: CLINIC | Age: 68
End: 2023-03-06
Payer: MEDICARE

## 2023-04-12 ENCOUNTER — TELEPHONE (OUTPATIENT)
Dept: OPHTHALMOLOGY | Facility: CLINIC | Age: 68
End: 2023-04-12
Payer: MEDICARE

## 2023-04-12 NOTE — TELEPHONE ENCOUNTER
----- Message from Corine Nicolas sent at 4/12/2023  4:18 PM CDT -----    ----- Message -----  From: Courtney Cherry MD  Sent: 4/12/2023   2:00 PM CDT  To: Domonique Jones MA, [facility] Staff    Ciara this is the iris nevus pt. -  change provider.

## 2023-05-03 ENCOUNTER — OFFICE VISIT (OUTPATIENT)
Dept: OPHTHALMOLOGY | Facility: CLINIC | Age: 68
End: 2023-05-03
Payer: MEDICARE

## 2023-05-03 DIAGNOSIS — H21.9 LESION OF IRIS: ICD-10-CM

## 2023-05-03 DIAGNOSIS — D22.9 ATYPICAL NEVUS: Primary | ICD-10-CM

## 2023-05-03 PROCEDURE — 92014 PR EYE EXAM, EST PATIENT,COMPREHESV: ICD-10-PCS | Mod: S$GLB,,, | Performed by: OPHTHALMOLOGY

## 2023-05-03 PROCEDURE — 92014 COMPRE OPH EXAM EST PT 1/>: CPT | Mod: S$GLB,,, | Performed by: OPHTHALMOLOGY

## 2023-05-03 PROCEDURE — 99213 OFFICE O/P EST LOW 20 MIN: CPT | Performed by: OPHTHALMOLOGY

## 2023-05-03 NOTE — PROGRESS NOTES
HPI     Concerns About Ocular Health            Comments: Iris Nevi OS          Comments    Patient here for Iris Nevi OS per Dr. Lynch  Patient states no vision complaints.  No drops.            Last edited by Masha Hanson MA on 5/3/2023  9:39 AM.            Assessment /Plan     For exam results, see Encounter Report.    Atypical nevus    Lesion of iris      Atypical appearing iris pigmented lesion with ectopion uvea, from 1-5:00 OS  Seems larger and darker over years, so overall increased concern for malignancy.  Re[facility].   [facility]

## 2023-05-08 ENCOUNTER — PES CALL (OUTPATIENT)
Dept: ADMINISTRATIVE | Facility: CLINIC | Age: 68
End: 2023-05-08
Payer: MEDICARE

## 2023-05-11 ENCOUNTER — PATIENT MESSAGE (OUTPATIENT)
Dept: OPTOMETRY | Facility: CLINIC | Age: 68
End: 2023-05-11
Payer: MEDICARE

## 2023-05-24 ENCOUNTER — PATIENT MESSAGE (OUTPATIENT)
Dept: OPHTHALMOLOGY | Facility: CLINIC | Age: 68
End: 2023-05-24
Payer: MEDICARE

## 2023-05-30 ENCOUNTER — OFFICE VISIT (OUTPATIENT)
Dept: DERMATOLOGY | Facility: CLINIC | Age: 68
End: 2023-05-30
Payer: MEDICARE

## 2023-05-30 DIAGNOSIS — L57.0 AK (ACTINIC KERATOSIS): Primary | ICD-10-CM

## 2023-05-30 DIAGNOSIS — Z12.83 SCREENING EXAM FOR SKIN CANCER: ICD-10-CM

## 2023-05-30 DIAGNOSIS — L82.1 SK (SEBORRHEIC KERATOSIS): ICD-10-CM

## 2023-05-30 DIAGNOSIS — L81.4 LENTIGO: ICD-10-CM

## 2023-05-30 DIAGNOSIS — D22.9 MULTIPLE BENIGN NEVI: ICD-10-CM

## 2023-05-30 PROCEDURE — 99999 PR PBB SHADOW E&M-EST. PATIENT-LVL III: CPT | Mod: PBBFAC,,, | Performed by: DERMATOLOGY

## 2023-05-30 PROCEDURE — 1126F PR PAIN SEVERITY QUANTIFIED, NO PAIN PRESENT: ICD-10-PCS | Mod: CPTII,S$GLB,, | Performed by: DERMATOLOGY

## 2023-05-30 PROCEDURE — 1101F PR PT FALLS ASSESS DOC 0-1 FALLS W/OUT INJ PAST YR: ICD-10-PCS | Mod: CPTII,S$GLB,, | Performed by: DERMATOLOGY

## 2023-05-30 PROCEDURE — 17000 PR DESTRUCTION(LASER SURGERY,CRYOSURGERY,CHEMOSURGERY),PREMALIGNANT LESIONS,FIRST LESION: ICD-10-PCS | Mod: S$GLB,,, | Performed by: DERMATOLOGY

## 2023-05-30 PROCEDURE — 99203 OFFICE O/P NEW LOW 30 MIN: CPT | Mod: 25,S$GLB,, | Performed by: DERMATOLOGY

## 2023-05-30 PROCEDURE — 3288F PR FALLS RISK ASSESSMENT DOCUMENTED: ICD-10-PCS | Mod: CPTII,S$GLB,, | Performed by: DERMATOLOGY

## 2023-05-30 PROCEDURE — 1159F MED LIST DOCD IN RCRD: CPT | Mod: CPTII,S$GLB,, | Performed by: DERMATOLOGY

## 2023-05-30 PROCEDURE — 17000 DESTRUCT PREMALG LESION: CPT | Mod: S$GLB,,, | Performed by: DERMATOLOGY

## 2023-05-30 PROCEDURE — 3288F FALL RISK ASSESSMENT DOCD: CPT | Mod: CPTII,S$GLB,, | Performed by: DERMATOLOGY

## 2023-05-30 PROCEDURE — 17003 DESTRUCTION, PREMALIGNANT LESIONS; SECOND THROUGH 14 LESIONS: ICD-10-PCS | Mod: S$GLB,,, | Performed by: DERMATOLOGY

## 2023-05-30 PROCEDURE — 17003 DESTRUCT PREMALG LES 2-14: CPT | Mod: S$GLB,,, | Performed by: DERMATOLOGY

## 2023-05-30 PROCEDURE — 1160F PR REVIEW ALL MEDS BY PRESCRIBER/CLIN PHARMACIST DOCUMENTED: ICD-10-PCS | Mod: CPTII,S$GLB,, | Performed by: DERMATOLOGY

## 2023-05-30 PROCEDURE — 99999 PR PBB SHADOW E&M-EST. PATIENT-LVL III: ICD-10-PCS | Mod: PBBFAC,,, | Performed by: DERMATOLOGY

## 2023-05-30 PROCEDURE — 99203 PR OFFICE/OUTPT VISIT, NEW, LEVL III, 30-44 MIN: ICD-10-PCS | Mod: 25,S$GLB,, | Performed by: DERMATOLOGY

## 2023-05-30 PROCEDURE — 1160F RVW MEDS BY RX/DR IN RCRD: CPT | Mod: CPTII,S$GLB,, | Performed by: DERMATOLOGY

## 2023-05-30 PROCEDURE — 1101F PT FALLS ASSESS-DOCD LE1/YR: CPT | Mod: CPTII,S$GLB,, | Performed by: DERMATOLOGY

## 2023-05-30 PROCEDURE — 1159F PR MEDICATION LIST DOCUMENTED IN MEDICAL RECORD: ICD-10-PCS | Mod: CPTII,S$GLB,, | Performed by: DERMATOLOGY

## 2023-05-30 PROCEDURE — 1126F AMNT PAIN NOTED NONE PRSNT: CPT | Mod: CPTII,S$GLB,, | Performed by: DERMATOLOGY

## 2023-05-30 NOTE — PROGRESS NOTES
Subjective:      Patient ID:  Diann Wasserman is a 67 y.o. female who presents for   Chief Complaint   Patient presents with    Skin Check     UBSE    Lesion     R upper back     Here for Upper Body Skin Exam    Last seen by dermatology Dr. [facility] 2001    No h/o nmsc or mm      Pt reports a moderate to severe amount of lifetime sun exposure.    Patient with specific complaint of lesion(s)  Location: R upper back  Duration: 1+ year  Symptoms: none  Relieving factors/Previous treatments: none    Review of Systems   Skin:  Positive for daily sunscreen use, activity-related sunscreen use and wears hat (activities). Negative for recent sunburn.   Hematologic/Lymphatic: Does not bruise/bleed easily.     Objective:   Physical Exam   Constitutional: She appears well-developed and well-nourished. No distress.   Neurological: She is alert and oriented to person, place, and time. She is not disoriented.   Psychiatric: She has a normal mood and affect.   Skin:   Areas Examined (abnormalities noted in diagram):   Head / Face Inspection Performed  Neck Inspection Performed  Chest / Axilla Inspection Performed  Back Inspection Performed  RUE Inspected  LUE Inspection Performed               Diagram Legend     Erythematous scaling macule/papule c/w actinic keratosis       Vascular papule c/w angioma      Pigmented verrucoid papule/plaque c/w seborrheic keratosis      Yellow umbilicated papule c/w sebaceous hyperplasia      Irregularly shaped tan macule c/w lentigo     1-2 mm smooth white papules consistent with Milia      Movable subcutaneous cyst with punctum c/w epidermal inclusion cyst      Subcutaneous movable cyst c/w pilar cyst      Firm pink to brown papule c/w dermatofibroma      Pedunculated fleshy papule(s) c/w skin tag(s)      Evenly pigmented macule c/w junctional nevus     Mildly variegated pigmented, slightly irregular-bordered macule c/w mildly atypical nevus      Flesh colored to evenly pigmented papule  c/w intradermal nevus       Pink pearly papule/plaque c/w basal cell carcinoma      Erythematous hyperkeratotic cursted plaque c/w SCC      Surgical scar with no sign of skin cancer recurrence      Open and closed comedones      Inflammatory papules and pustules      Verrucoid papule consistent consistent with wart     Erythematous eczematous patches and plaques     Dystrophic onycholytic nail with subungual debris c/w onychomycosis     Umbilicated papule    Erythematous-base heme-crusted tan verrucoid plaque consistent with inflamed seborrheic keratosis     Erythematous Silvery Scaling Plaque c/w Psoriasis     See annotation      Assessment / Plan:        AK (actinic keratosis)  Cryosurgery Procedure Note    Verbal consent from the patient is obtained including, but not limited to, risk of hypopigmentation/hyperpigmentation, scar, recurrence of lesion. The patient is aware of the precancerous quality and need for treatment of these lesions. Liquid nitrogen cryosurgery is applied to the 6 actinic keratoses, as detailed in the physical exam, to produce a freeze injury. The patient is aware that blisters may form and is instructed on wound care with gentle cleansing and use of vaseline ointment to keep moist until healed. The patient is supplied a handout on cryosurgery and is instructed to call if lesions do not completely resolve.      SK (seborrheic keratosis)  These are benign inherited growths without a malignant potential. Reassurance given to patient. No treatment is necessary.       Lentigo  This is a benign hyperpigmented sun induced lesion. Recommend daily sun protection/avoidance and use of at least SPF 30, broad spectrum sunscreen (OTC drug) will reduce the number of new lesions. Treatment of these benign lesions are considered cosmetic.      Multiple benign nevi   - minor problem and chronic.   Reassurance given to patient. No treatment necessary.       Screening exam for skin cancer  Upper body skin  examination performed today including at least 6 points as noted in physical examination. No lesions suspicious for malignancy noted.    Recommend daily sun protection/avoidance and use of at least SPF 30, broad spectrum sunscreen (OTC drug).                No follow-ups on file.

## 2023-05-30 NOTE — PATIENT INSTRUCTIONS

## 2023-06-15 ENCOUNTER — PES CALL (OUTPATIENT)
Dept: ADMINISTRATIVE | Facility: CLINIC | Age: 68
End: 2023-06-15
Payer: MEDICARE

## 2023-06-15 ENCOUNTER — OFFICE VISIT (OUTPATIENT)
Dept: PRIMARY CARE CLINIC | Facility: CLINIC | Age: 68
End: 2023-06-15
Payer: MEDICARE

## 2023-06-15 VITALS
HEART RATE: 66 BPM | TEMPERATURE: 98 F | OXYGEN SATURATION: 97 % | SYSTOLIC BLOOD PRESSURE: 130 MMHG | DIASTOLIC BLOOD PRESSURE: 70 MMHG | HEIGHT: 62 IN | BODY MASS INDEX: 24.54 KG/M2 | WEIGHT: 133.38 LBS

## 2023-06-15 DIAGNOSIS — J01.10 ACUTE NON-RECURRENT FRONTAL SINUSITIS: Primary | ICD-10-CM

## 2023-06-15 PROCEDURE — 3008F BODY MASS INDEX DOCD: CPT | Mod: CPTII,S$GLB,, | Performed by: INTERNAL MEDICINE

## 2023-06-15 PROCEDURE — 3008F PR BODY MASS INDEX (BMI) DOCUMENTED: ICD-10-PCS | Mod: CPTII,S$GLB,, | Performed by: INTERNAL MEDICINE

## 2023-06-15 PROCEDURE — 1125F PR PAIN SEVERITY QUANTIFIED, PAIN PRESENT: ICD-10-PCS | Mod: CPTII,S$GLB,, | Performed by: INTERNAL MEDICINE

## 2023-06-15 PROCEDURE — 99999 PR PBB SHADOW E&M-EST. PATIENT-LVL III: ICD-10-PCS | Mod: PBBFAC,,, | Performed by: INTERNAL MEDICINE

## 2023-06-15 PROCEDURE — 3075F PR MOST RECENT SYSTOLIC BLOOD PRESS GE 130-139MM HG: ICD-10-PCS | Mod: CPTII,S$GLB,, | Performed by: INTERNAL MEDICINE

## 2023-06-15 PROCEDURE — 3078F PR MOST RECENT DIASTOLIC BLOOD PRESSURE < 80 MM HG: ICD-10-PCS | Mod: CPTII,S$GLB,, | Performed by: INTERNAL MEDICINE

## 2023-06-15 PROCEDURE — 1159F PR MEDICATION LIST DOCUMENTED IN MEDICAL RECORD: ICD-10-PCS | Mod: CPTII,S$GLB,, | Performed by: INTERNAL MEDICINE

## 2023-06-15 PROCEDURE — 1160F RVW MEDS BY RX/DR IN RCRD: CPT | Mod: CPTII,S$GLB,, | Performed by: INTERNAL MEDICINE

## 2023-06-15 PROCEDURE — 1159F MED LIST DOCD IN RCRD: CPT | Mod: CPTII,S$GLB,, | Performed by: INTERNAL MEDICINE

## 2023-06-15 PROCEDURE — 99214 OFFICE O/P EST MOD 30 MIN: CPT | Mod: S$GLB,,, | Performed by: INTERNAL MEDICINE

## 2023-06-15 PROCEDURE — 1125F AMNT PAIN NOTED PAIN PRSNT: CPT | Mod: CPTII,S$GLB,, | Performed by: INTERNAL MEDICINE

## 2023-06-15 PROCEDURE — 3078F DIAST BP <80 MM HG: CPT | Mod: CPTII,S$GLB,, | Performed by: INTERNAL MEDICINE

## 2023-06-15 PROCEDURE — 99999 PR PBB SHADOW E&M-EST. PATIENT-LVL III: CPT | Mod: PBBFAC,,, | Performed by: INTERNAL MEDICINE

## 2023-06-15 PROCEDURE — 3075F SYST BP GE 130 - 139MM HG: CPT | Mod: CPTII,S$GLB,, | Performed by: INTERNAL MEDICINE

## 2023-06-15 PROCEDURE — 99214 PR OFFICE/OUTPT VISIT, EST, LEVL IV, 30-39 MIN: ICD-10-PCS | Mod: S$GLB,,, | Performed by: INTERNAL MEDICINE

## 2023-06-15 PROCEDURE — 1160F PR REVIEW ALL MEDS BY PRESCRIBER/CLIN PHARMACIST DOCUMENTED: ICD-10-PCS | Mod: CPTII,S$GLB,, | Performed by: INTERNAL MEDICINE

## 2023-06-15 RX ORDER — DOXYCYCLINE HYCLATE 100 MG
100 TABLET ORAL DAILY
Qty: 7 TABLET | Refills: 0 | Status: SHIPPED | OUTPATIENT
Start: 2023-06-15 | End: 2023-11-06

## 2023-06-15 RX ORDER — BROMPHENIRAMINE MALEATE, PSEUDOEPHEDRINE HYDROCHLORIDE, AND DEXTROMETHORPHAN HYDROBROMIDE 2; 30; 10 MG/5ML; MG/5ML; MG/5ML
5 SYRUP ORAL 4 TIMES DAILY PRN
Qty: 118 ML | Refills: 0 | Status: SHIPPED | OUTPATIENT
Start: 2023-06-15 | End: 2023-06-25

## 2023-06-15 NOTE — PROGRESS NOTES
[facility] Note    Chief Complaint      Chief Complaint   Patient presents with    Sinus Problem     Started on Safia 3     Sore Throat    Otalgia    Neck Pain       History of Present Illness      Diann Wasserman is a 67 y.o. female who presents today for   Chief Complaint   Patient presents with    Sinus Problem     Started on Safia 3     Sore Throat    Otalgia    Neck Pain   .  Patient comes to appointment here for acute visit related to above .started 2 weeks ago with swollen neck glands , sinus pain , post nasal drip and fever . She states the fever is gone but the sore throat and sinus drip and pain have persisted . She is now complaining of ear pain as well.    Problem List Items Addressed This Visit          ENT    Acute non-recurrent frontal sinusitis - Primary    Overview     pcn allergic   zpak   bromphed 5ml po q 6 cough congestion              Past Medical History:  Past Medical History:   Diagnosis Date    Arthritis     Cataract     Iris nevus, left     Right hip pain        Past Surgical History:  Past Surgical History:   Procedure Laterality Date    BREAST SURGERY Bilateral     breast lift    CHOLECYSTECTOMY  2012    COLONOSCOPY N/A 04/06/2016    Procedure: COLONOSCOPY;  Surgeon: Luis Bogran-Reyes, MD;  Location: [facility];  Service: Endoscopy;  Laterality: N/A;    COSMETIC SURGERY  1985    bilateral breast lift    HIP SURGERY  11/2014    right total    JOINT REPLACEMENT      TOTAL REDUCTION MAMMOPLASTY      TUBAL LIGATION         Family History:  family history includes Aortic aneurysm in her father; Breast cancer in her cousin; Breast cancer (age of onset: 40) in her maternal aunt; Hypertension in her father; Hypothyroidism in her mother; No Known Problems in her brother and sister; Stroke in her father.    Social History:  Social History     Socioeconomic History    Marital status:     Years of education: 10   Tobacco Use    Smoking status: Former      Packs/day: 0.25     Years: 1.00     Pack years: 0.25     Types: Cigarettes     Start date: 1969     Quit date: 1971     Years since quittin.1    Smokeless tobacco: Former    Tobacco comments:     less than 1ppd x 3 years   Substance and Sexual Activity    Alcohol use: Yes     Alcohol/week: 5.0 standard drinks     Types: 3 Glasses of wine, 2 Drinks containing 0.5 oz of alcohol per week     Comment: one bottle of wine on weekends    Drug use: No    Sexual activity: Yes     Partners: Male     Birth control/protection: See Surgical Hx     Social Determinants of Health     Financial Resource Strain: Unknown    Difficulty of Paying Living Expenses: Patient refused   Food Insecurity: Unknown    Worried About Running Out of Food in the Last Year: Patient refused    Ran Out of Food in the Last Year: Patient refused   Transportation Needs: Unknown    Lack of Transportation (Medical): Patient refused    Lack of Transportation (Non-Medical): Patient refused   Physical Activity: Sufficiently Active    Days of Exercise per Week: 5 days    Minutes of Exercise per Session: 30 min   Stress: No Stress Concern Present    Feeling of Stress : Not at all   Social Connections: Moderately Isolated    Frequency of Communication with Friends and Family: Three times a week    Frequency of Social Gatherings with Friends and Family: More than three times a week    Attends Tenriism Services: Never    Active Member of Clubs or Organizations: No    Attends Club or Organization Meetings: Never    Marital Status:    Housing Stability: Unknown    Unable to Pay for Housing in the Last Year: Patient refused    Unstable Housing in the Last Year: Patient refused       Review of Systems:   Review of Systems   Constitutional:  Negative for malaise/fatigue.   HENT:  Positive for congestion, sinus pain and sore throat.    Respiratory:  Positive for cough.    Cardiovascular:  Negative for chest pain.   Gastrointestinal:  Negative for  nausea and vomiting.   Neurological:  Negative for headaches.       Medications:  Outpatient Encounter Medications as of 6/15/2023   Medication Sig Dispense Refill    brompheniramine-pseudoeph-DM (BROMFED DM) 2-30-10 mg/5 mL Syrp Take 5 mLs by mouth 4 (four) times daily as needed. 118 mL 0    doxycycline (VIBRA-TABS) 100 MG tablet Take 1 tablet (100 mg total) by mouth once daily. 7 tablet 0    multivit-min/iron/folic/[id number] (HAIR, SKIN AND NAILS ADVANCED ORAL) Take by mouth Daily.      multivitamin (THERAGRAN) per tablet Take 1 tablet by mouth once daily.      TURMERIC ORAL Take by mouth Daily.       No facility-administered encounter medications on file as of 6/15/2023.        Allergies:  Review of patient's allergies indicates:   Allergen Reactions    Erythromycin Rash    Penicillins Rash and Other (See Comments)     Redness         Physical Exam         Vitals:    06/15/23 1329   BP: 130/70   Pulse: 66   Temp: 98.4 °F (36.9 °C)         Physical Exam  Constitutional:       General: She is not in acute distress.     Appearance: Normal appearance. She is well-developed. She is not ill-appearing.   Eyes:      Pupils: Pupils are equal, round, and reactive to light.   Neck:      Thyroid: No thyromegaly.   Cardiovascular:      Rate and Rhythm: Normal rate.      Heart sounds: Normal heart sounds. No murmur heard.    No friction rub. No gallop.   Pulmonary:      Breath sounds: Normal breath sounds.   Abdominal:      General: Bowel sounds are normal.      Palpations: Abdomen is soft.   Musculoskeletal:         General: Normal range of motion.      Cervical back: Normal range of motion.   Lymphadenopathy:      Cervical: No cervical adenopathy.   Skin:     General: Skin is warm.      Findings: No rash.   Neurological:      Mental Status: She is alert and oriented to person, place, and time.      Cranial Nerves: No cranial nerve deficit.   Psychiatric:         Behavior: Behavior normal.        Laboratory:  CBC:  No results  for input(s): WBC, RBC, HGB, HCT, PLT, MCV, MCH, MCHC in the last 2160 hours.  CMP:  No results for input(s): GLU, CALCIUM, ALBUMIN, PROT, NA, K, CO2, CL, BUN, ALKPHOS, ALT, AST, BILITOT in the last 2160 hours.    Invalid input(s): CREATININ  URINALYSIS:  No results for input(s): COLORU, CLARITYU, SPECGRAV, PHUR, PROTEINUA, GLUCOSEU, BILIRUBINCON, BLOODU, WBCU, RBCU, BACTERIA, MUCUS, NITRITE, LEUKOCYTESUR, UROBILINOGEN, HYALINECASTS in the last 2160 hours.   LIPIDS:  No results for input(s): TSH, HDL, CHOL, TRIG, LDLCALC, CHOLHDL, NONHDLCHOL, TOTALCHOLEST in the last 2160 hours.  TSH:  No results for input(s): TSH in the last 2160 hours.  A1C:  No results for input(s): HGBA1C in the last 2160 hours.    Radiology:        Assessment:     Diann Wasserman is a 67 y.o.female with:    Acute non-recurrent frontal sinusitis  -     brompheniramine-pseudoeph-DM (BROMFED DM) 2-30-10 mg/5 mL Syrp; Take 5 mLs by mouth 4 (four) times daily as needed.  Dispense: 118 mL; Refill: 0  -     doxycycline (VIBRA-TABS) 100 MG tablet; Take 1 tablet (100 mg total) by mouth once daily.  Dispense: 7 tablet; Refill: 0          Plan:     Problem List Items Addressed This Visit          ENT    Acute non-recurrent frontal sinusitis - Primary    Overview     pcn allergic   zpak   bromphed 5ml po q 6 cough congestion            As above, continue current medications and maintain follow up with specialists.  Return to clinic prn       [facility]agn[facility]

## 2023-06-27 NOTE — ED NOTES
PA at bedside to explain test results, POC and dc instrx to pt   [Use of Plain Language] : use of plain language [Adequate] : adequate [None] : none

## 2023-09-18 PROBLEM — J01.10 ACUTE NON-RECURRENT FRONTAL SINUSITIS: Status: RESOLVED | Noted: 2023-06-15 | Resolved: 2023-09-18

## 2023-10-30 ENCOUNTER — LAB VISIT (OUTPATIENT)
Dept: LAB | Facility: HOSPITAL | Age: 68
End: 2023-10-30
Attending: INTERNAL MEDICINE
Payer: MEDICARE

## 2023-10-30 DIAGNOSIS — E78.5 BORDERLINE HYPERLIPIDEMIA: ICD-10-CM

## 2023-10-30 LAB
ALBUMIN SERPL BCP-MCNC: 4.2 G/DL (ref 3.5–5.2)
ALP SERPL-CCNC: 59 U/L (ref 55–135)
ALT SERPL W/O P-5'-P-CCNC: 18 U/L (ref 10–44)
ANION GAP SERPL CALC-SCNC: 4 MMOL/L (ref 8–16)
AST SERPL-CCNC: 17 U/L (ref 10–40)
BASOPHILS # BLD AUTO: 0.05 K/UL (ref 0–0.2)
BASOPHILS NFR BLD: 0.8 % (ref 0–1.9)
BILIRUB SERPL-MCNC: 0.4 MG/DL (ref 0.1–1)
BUN SERPL-MCNC: 10 MG/DL (ref 8–23)
CALCIUM SERPL-MCNC: 9.7 MG/DL (ref 8.7–10.5)
CHLORIDE SERPL-SCNC: 107 MMOL/L (ref 95–110)
CHOLEST SERPL-MCNC: 245 MG/DL (ref 120–199)
CHOLEST/HDLC SERPL: 3.2 {RATIO} (ref 2–5)
CO2 SERPL-SCNC: 30 MMOL/L (ref 23–29)
CREAT SERPL-MCNC: 0.7 MG/DL (ref 0.5–1.4)
DIFFERENTIAL METHOD: ABNORMAL
EOSINOPHIL # BLD AUTO: 0.1 K/UL (ref 0–0.5)
EOSINOPHIL NFR BLD: 1.3 % (ref 0–8)
ERYTHROCYTE [DISTWIDTH] IN BLOOD BY AUTOMATED COUNT: 12.2 % (ref 11.5–14.5)
EST. GFR  (NO RACE VARIABLE): >60 ML/MIN/1.73 M^2
GLUCOSE SERPL-MCNC: 92 MG/DL (ref 70–110)
HCT VFR BLD AUTO: 39.5 % (ref 37–48.5)
HDLC SERPL-MCNC: 76 MG/DL (ref 40–75)
HDLC SERPL: 31 % (ref 20–50)
HGB BLD-MCNC: 12.5 G/DL (ref 12–16)
IMM GRANULOCYTES # BLD AUTO: 0.01 K/UL (ref 0–0.04)
IMM GRANULOCYTES NFR BLD AUTO: 0.2 % (ref 0–0.5)
LDLC SERPL CALC-MCNC: 151.4 MG/DL (ref 63–159)
LYMPHOCYTES # BLD AUTO: 2.5 K/UL (ref 1–4.8)
LYMPHOCYTES NFR BLD: 41.6 % (ref 18–48)
MCH RBC QN AUTO: 29.2 PG (ref 27–31)
MCHC RBC AUTO-ENTMCNC: 31.6 G/DL (ref 32–36)
MCV RBC AUTO: 92 FL (ref 82–98)
MONOCYTES # BLD AUTO: 0.5 K/UL (ref 0.3–1)
MONOCYTES NFR BLD: 7.7 % (ref 4–15)
NEUTROPHILS # BLD AUTO: 2.9 K/UL (ref 1.8–7.7)
NEUTROPHILS NFR BLD: 48.4 % (ref 38–73)
NONHDLC SERPL-MCNC: 169 MG/DL
NRBC BLD-RTO: 0 /100 WBC
PLATELET # BLD AUTO: 290 K/UL (ref 150–450)
PMV BLD AUTO: 9.9 FL (ref 9.2–12.9)
POTASSIUM SERPL-SCNC: 4.3 MMOL/L (ref 3.5–5.1)
PROT SERPL-MCNC: 7.5 G/DL (ref 6–8.4)
RBC # BLD AUTO: 4.28 M/UL (ref 4–5.4)
SODIUM SERPL-SCNC: 141 MMOL/L (ref 136–145)
TRIGL SERPL-MCNC: 88 MG/DL (ref 30–150)
WBC # BLD AUTO: 6.01 K/UL (ref 3.9–12.7)

## 2023-10-30 PROCEDURE — 36415 COLL VENOUS BLD VENIPUNCTURE: CPT | Performed by: INTERNAL MEDICINE

## 2023-10-30 PROCEDURE — 85025 COMPLETE CBC W/AUTO DIFF WBC: CPT | Performed by: INTERNAL MEDICINE

## 2023-10-30 PROCEDURE — 80061 LIPID PANEL: CPT | Performed by: INTERNAL MEDICINE

## 2023-10-30 PROCEDURE — 80053 COMPREHEN METABOLIC PANEL: CPT | Performed by: INTERNAL MEDICINE

## 2023-11-06 ENCOUNTER — OFFICE VISIT (OUTPATIENT)
Dept: INTERNAL MEDICINE | Facility: CLINIC | Age: 68
End: 2023-11-06
Payer: MEDICARE

## 2023-11-06 VITALS
OXYGEN SATURATION: 96 % | WEIGHT: 132.69 LBS | SYSTOLIC BLOOD PRESSURE: 135 MMHG | HEART RATE: 66 BPM | HEIGHT: 62 IN | BODY MASS INDEX: 24.42 KG/M2 | DIASTOLIC BLOOD PRESSURE: 80 MMHG

## 2023-11-06 DIAGNOSIS — E78.5 HYPERLIPIDEMIA, UNSPECIFIED HYPERLIPIDEMIA TYPE: Primary | ICD-10-CM

## 2023-11-06 DIAGNOSIS — Z12.31 ENCOUNTER FOR SCREENING MAMMOGRAM FOR BREAST CANCER: ICD-10-CM

## 2023-11-06 DIAGNOSIS — M25.531 RIGHT WRIST PAIN: ICD-10-CM

## 2023-11-06 PROCEDURE — 1160F RVW MEDS BY RX/DR IN RCRD: CPT | Mod: CPTII,S$GLB,, | Performed by: INTERNAL MEDICINE

## 2023-11-06 PROCEDURE — 3075F SYST BP GE 130 - 139MM HG: CPT | Mod: CPTII,S$GLB,, | Performed by: INTERNAL MEDICINE

## 2023-11-06 PROCEDURE — 3075F PR MOST RECENT SYSTOLIC BLOOD PRESS GE 130-139MM HG: ICD-10-PCS | Mod: CPTII,S$GLB,, | Performed by: INTERNAL MEDICINE

## 2023-11-06 PROCEDURE — 3008F PR BODY MASS INDEX (BMI) DOCUMENTED: ICD-10-PCS | Mod: CPTII,S$GLB,, | Performed by: INTERNAL MEDICINE

## 2023-11-06 PROCEDURE — 99999 PR PBB SHADOW E&M-EST. PATIENT-LVL IV: CPT | Mod: PBBFAC,,, | Performed by: INTERNAL MEDICINE

## 2023-11-06 PROCEDURE — 99999 PR PBB SHADOW E&M-EST. PATIENT-LVL IV: ICD-10-PCS | Mod: PBBFAC,,, | Performed by: INTERNAL MEDICINE

## 2023-11-06 PROCEDURE — 3008F BODY MASS INDEX DOCD: CPT | Mod: CPTII,S$GLB,, | Performed by: INTERNAL MEDICINE

## 2023-11-06 PROCEDURE — 3288F PR FALLS RISK ASSESSMENT DOCUMENTED: ICD-10-PCS | Mod: CPTII,S$GLB,, | Performed by: INTERNAL MEDICINE

## 2023-11-06 PROCEDURE — 99214 PR OFFICE/OUTPT VISIT, EST, LEVL IV, 30-39 MIN: ICD-10-PCS | Mod: S$GLB,,, | Performed by: INTERNAL MEDICINE

## 2023-11-06 PROCEDURE — 1125F AMNT PAIN NOTED PAIN PRSNT: CPT | Mod: CPTII,S$GLB,, | Performed by: INTERNAL MEDICINE

## 2023-11-06 PROCEDURE — 1159F PR MEDICATION LIST DOCUMENTED IN MEDICAL RECORD: ICD-10-PCS | Mod: CPTII,S$GLB,, | Performed by: INTERNAL MEDICINE

## 2023-11-06 PROCEDURE — 1159F MED LIST DOCD IN RCRD: CPT | Mod: CPTII,S$GLB,, | Performed by: INTERNAL MEDICINE

## 2023-11-06 PROCEDURE — 1125F PR PAIN SEVERITY QUANTIFIED, PAIN PRESENT: ICD-10-PCS | Mod: CPTII,S$GLB,, | Performed by: INTERNAL MEDICINE

## 2023-11-06 PROCEDURE — 99214 OFFICE O/P EST MOD 30 MIN: CPT | Mod: S$GLB,,, | Performed by: INTERNAL MEDICINE

## 2023-11-06 PROCEDURE — 1101F PT FALLS ASSESS-DOCD LE1/YR: CPT | Mod: CPTII,S$GLB,, | Performed by: INTERNAL MEDICINE

## 2023-11-06 PROCEDURE — 3288F FALL RISK ASSESSMENT DOCD: CPT | Mod: CPTII,S$GLB,, | Performed by: INTERNAL MEDICINE

## 2023-11-06 PROCEDURE — 1101F PR PT FALLS ASSESS DOC 0-1 FALLS W/OUT INJ PAST YR: ICD-10-PCS | Mod: CPTII,S$GLB,, | Performed by: INTERNAL MEDICINE

## 2023-11-06 PROCEDURE — 1160F PR REVIEW ALL MEDS BY PRESCRIBER/CLIN PHARMACIST DOCUMENTED: ICD-10-PCS | Mod: CPTII,S$GLB,, | Performed by: INTERNAL MEDICINE

## 2023-11-06 PROCEDURE — 3079F PR MOST RECENT DIASTOLIC BLOOD PRESSURE 80-89 MM HG: ICD-10-PCS | Mod: CPTII,S$GLB,, | Performed by: INTERNAL MEDICINE

## 2023-11-06 PROCEDURE — 3079F DIAST BP 80-89 MM HG: CPT | Mod: CPTII,S$GLB,, | Performed by: INTERNAL MEDICINE

## 2023-11-06 RX ORDER — ATORVASTATIN CALCIUM 40 MG/1
40 TABLET, FILM COATED ORAL DAILY
Qty: 90 TABLET | Refills: 3 | Status: SHIPPED | OUTPATIENT
Start: 2023-11-06 | End: 2024-02-06

## 2023-11-06 NOTE — PROGRESS NOTES
"Subjective:       Patient ID: Diann Wasserman is a 68 y.o. female.    Chief Complaint: Annual Exam    HPI  Diann Wasserman is a 68 y.o. 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 Really bothering her a lot.   Review of Systems   Constitutional:  Negative for fever.   Respiratory:  Negative for shortness of breath.    Cardiovascular:  Negative for chest pain.   Musculoskeletal:  Positive for arthralgias.        Right wrist pain   Skin: Negative.        Objective:   /80 (BP Location: Left arm, Patient Position: Sitting, BP Method: Medium (Manual))   Pulse 66   Ht 5' 2" (1.575 m)   Wt 60.2 kg (132 lb 11.5 oz)   SpO2 96%   BMI 24.27 kg/m²      Physical Exam  Constitutional:       General: She is not in acute distress.     Appearance: She is well-developed. She is not diaphoretic.   HENT:      Head: Normocephalic and atraumatic.   Cardiovascular:      Rate and Rhythm: Normal rate and regular rhythm.   Pulmonary:      Effort: Pulmonary effort is normal. No respiratory distress.      Breath sounds: No wheezing or rales.   Abdominal:      General: There is no distension.      Palpations: Abdomen is soft.      Tenderness: There is no abdominal tenderness.   Musculoskeletal:      Comments: Ttp right wrist over snuffbox   Lymphadenopathy:      Cervical: No cervical adenopathy.   Skin:     General: Skin is warm and dry.   Neurological:      Mental Status: She is alert and oriented to person, " place, and time.   Psychiatric:         Behavior: Behavior normal.         Assessment:       1. Hyperlipidemia, unspecified hyperlipidemia type    2. Encounter for screening mammogram for breast cancer    3. Right wrist pain        Plan:       1. Hyperlipidemia, unspecified hyperlipidemia type  -     START atorvastatin (LIPITOR) 40 MG tablet; Take 1 tablet (40 mg total) by mouth once daily.  Dispense: 90 tablet; Refill: 3  -    LABS 3 MO:   Lipid Panel; Future; Expected date: 11/06/2023  -     COMPREHENSIVE METABOLIC PANEL; Future; Expected date: 11/06/2023    2. Encounter for screening mammogram for breast cancer  -     Mammo Digital Screening Bilat w/ Jerome; Future; Expected date: 11/06/2023    3. Right wrist pain  -     CT Wrist Without Contrast Right; Future; Expected date: 11/06/2023  -     Ambulatory referral to Orthopedics; Future; Expected date: 11/13/2023           Health Maintenance Due   Topic    Mammogram       Mmg  Start statin  Labs 3 months  Vaccines discussed and declines at this time

## 2023-11-08 ENCOUNTER — HOSPITAL ENCOUNTER (OUTPATIENT)
Dept: RADIOLOGY | Facility: HOSPITAL | Age: 68
Discharge: HOME | End: 2023-11-08
Attending: INTERNAL MEDICINE
Payer: MEDICARE

## 2023-11-08 DIAGNOSIS — Z12.31 ENCOUNTER FOR SCREENING MAMMOGRAM FOR BREAST CANCER: ICD-10-CM

## 2023-11-08 PROCEDURE — 77067 MAMMO DIGITAL SCREENING BILAT WITH TOMO: ICD-10-PCS | Mod: 26,,, | Performed by: RADIOLOGY

## 2023-11-08 PROCEDURE — 77067 SCR MAMMO BI INCL CAD: CPT | Mod: 26,,, | Performed by: RADIOLOGY

## 2023-11-08 PROCEDURE — 77063 MAMMO DIGITAL SCREENING BILAT WITH TOMO: ICD-10-PCS | Mod: 26,,, | Performed by: RADIOLOGY

## 2023-11-08 PROCEDURE — 77067 SCR MAMMO BI INCL CAD: CPT | Mod: TC

## 2023-11-08 PROCEDURE — 77063 BREAST TOMOSYNTHESIS BI: CPT | Mod: 26,,, | Performed by: RADIOLOGY

## 2023-11-10 ENCOUNTER — HOSPITAL ENCOUNTER (OUTPATIENT)
Dept: RADIOLOGY | Facility: HOSPITAL | Age: 68
Discharge: HOME | End: 2023-11-10
Attending: INTERNAL MEDICINE
Payer: MEDICARE

## 2023-11-10 DIAGNOSIS — M25.531 RIGHT WRIST PAIN: ICD-10-CM

## 2023-11-10 PROCEDURE — 73200 CT UPPER EXTREMITY W/O DYE: CPT | Mod: TC,RT

## 2023-11-10 PROCEDURE — 73200 CT WRIST WITHOUT CONTRAST RIGHT: ICD-10-PCS | Mod: 26,RT,, | Performed by: RADIOLOGY

## 2023-11-10 PROCEDURE — 73200 CT UPPER EXTREMITY W/O DYE: CPT | Mod: 26,RT,, | Performed by: RADIOLOGY

## 2023-12-08 DIAGNOSIS — M79.641 RIGHT HAND PAIN: Primary | ICD-10-CM

## 2023-12-20 ENCOUNTER — HOSPITAL ENCOUNTER (OUTPATIENT)
Dept: RADIOLOGY | Facility: OTHER | Age: 68
Discharge: HOME | End: 2023-12-20
Attending: ORTHOPAEDIC SURGERY
Payer: MEDICARE

## 2023-12-20 ENCOUNTER — OFFICE VISIT (OUTPATIENT)
Dept: ORTHOPEDICS | Facility: CLINIC | Age: 68
End: 2023-12-20
Payer: MEDICARE

## 2023-12-20 DIAGNOSIS — M25.531 CHRONIC PAIN OF RIGHT WRIST: ICD-10-CM

## 2023-12-20 DIAGNOSIS — G89.29 CHRONIC PAIN OF RIGHT WRIST: ICD-10-CM

## 2023-12-20 DIAGNOSIS — M65.4 TENOSYNOVITIS, DE QUERVAIN: ICD-10-CM

## 2023-12-20 DIAGNOSIS — M79.641 RIGHT HAND PAIN: ICD-10-CM

## 2023-12-20 DIAGNOSIS — M19.131 SCAPHOLUNATE ADVANCED COLLAPSE OF RIGHT WRIST: Primary | ICD-10-CM

## 2023-12-20 PROCEDURE — 20550 NJX 1 TENDON SHEATH/LIGAMENT: CPT | Mod: 51,XS,RT,S$GLB | Performed by: ORTHOPAEDIC SURGERY

## 2023-12-20 PROCEDURE — 99214 OFFICE O/P EST MOD 30 MIN: CPT | Mod: 25,S$GLB,, | Performed by: ORTHOPAEDIC SURGERY

## 2023-12-20 PROCEDURE — 1125F AMNT PAIN NOTED PAIN PRSNT: CPT | Mod: CPTII,S$GLB,, | Performed by: ORTHOPAEDIC SURGERY

## 2023-12-20 PROCEDURE — 3288F FALL RISK ASSESSMENT DOCD: CPT | Mod: CPTII,S$GLB,, | Performed by: ORTHOPAEDIC SURGERY

## 2023-12-20 PROCEDURE — 1159F MED LIST DOCD IN RCRD: CPT | Mod: CPTII,S$GLB,, | Performed by: ORTHOPAEDIC SURGERY

## 2023-12-20 PROCEDURE — 1101F PT FALLS ASSESS-DOCD LE1/YR: CPT | Mod: CPTII,S$GLB,, | Performed by: ORTHOPAEDIC SURGERY

## 2023-12-20 PROCEDURE — 73130 X-RAY EXAM OF HAND: CPT | Mod: 26,RT,, | Performed by: RADIOLOGY

## 2023-12-20 PROCEDURE — 73130 X-RAY EXAM OF HAND: CPT | Mod: TC,FY,RT

## 2023-12-20 PROCEDURE — 1160F RVW MEDS BY RX/DR IN RCRD: CPT | Mod: CPTII,S$GLB,, | Performed by: ORTHOPAEDIC SURGERY

## 2023-12-20 PROCEDURE — 73130 XR HAND COMPLETE 3 VIEW RIGHT: ICD-10-PCS | Mod: 26,RT,, | Performed by: RADIOLOGY

## 2023-12-20 PROCEDURE — 20605 DRAIN/INJ JOINT/BURSA W/O US: CPT | Mod: RT,S$GLB,, | Performed by: ORTHOPAEDIC SURGERY

## 2023-12-20 PROCEDURE — 99999 PR PBB SHADOW E&M-EST. PATIENT-LVL III: CPT | Mod: PBBFAC,,, | Performed by: ORTHOPAEDIC SURGERY

## 2023-12-20 RX ADMIN — METHYLPREDNISOLONE ACETATE 40 MG: 40 INJECTION, SUSPENSION INTRA-ARTICULAR; INTRALESIONAL; INTRAMUSCULAR; SOFT TISSUE at 10:12

## 2023-12-20 NOTE — PROGRESS NOTES
[facility]  History & Physical  Orthopedics    SUBJECTIVE:        Chief Complaint:   Chief Complaint   Patient presents with    Right Hand - Pain       Referring Provider: Lynne Mccormack MD     History of Present Illness:  Patient is a 68 y.o. right hand dominant female who presents today with complaints of right dorsal wrist pain and radial wrist pain. Patient states that initial injury was about 4 years ago when she fell, did not seek medical treatment at that time.  2 years ago, she fell and saw Dr. Davis Martinez. She was diagnosed with a scapholunate dissociation and collapse. She fell again about 1 year ago and went to ER; was discharged as no fracture was seen on xray.     The patient is a/an raised 6 children.    Patient is here today c/o increased pain and states that bracing is not helping any longer. She denies any injections in the past or any surgery. Pain is affecting ADLs; doing dishes, etc.      The patient denies any fevers, chills, N/V, D/C and presents for evaluation.       Past Medical History:   Diagnosis Date    Arthritis     Cataract     Iris nevus, left     Right hip pain      Past Surgical History:   Procedure Laterality Date    BREAST SURGERY Bilateral     breast lift    CHOLECYSTECTOMY  2012    COLONOSCOPY N/A 04/06/2016    Procedure: COLONOSCOPY;  Surgeon: Luis Bogran-Reyes, MD;  Location: [facility];  Service: Endoscopy;  Laterality: N/A;    COSMETIC SURGERY  1985    bilateral breast lift    HIP SURGERY  11/2014    right total    JOINT REPLACEMENT      TOTAL REDUCTION MAMMOPLASTY      TUBAL LIGATION       Review of patient's allergies indicates:   Allergen Reactions    Erythromycin Rash    Penicillins Rash and Other (See Comments)     Redness     Social History     Social History Narrative    Not on file     Family History   Problem Relation Age of Onset    Hypothyroidism Mother     Hypertension Father     Aortic aneurysm Father     Stroke Father     No Known Problems  Sister     No Known Problems Brother     Breast cancer Maternal Aunt 40    Breast cancer Cousin          Current Outpatient Medications:     atorvastatin (LIPITOR) 40 MG tablet, Take 1 tablet (40 mg total) by mouth once daily., Disp: 90 tablet, Rfl: 3    multivit-min/iron/folic/faf419 (HAIR, SKIN AND NAILS ADVANCED ORAL), Take by mouth Daily., Disp: , Rfl:     multivitamin (THERAGRAN) per tablet, Take 1 tablet by mouth once daily., Disp: , Rfl:     TURMERIC ORAL, Take by mouth Daily., Disp: , Rfl:     ROS    Review of Systems:  Constitutional: no fever or chills  Eyes: no visual changes  ENT: no nasal congestion or sore throat  Respiratory: no cough or shortness of breath  Cardiovascular: no chest pain  Gastrointestinal: no nausea or vomiting, tolerating diet  Musculoskeletal: pain and soreness    OBJECTIVE:      Vital Signs (Most Recent):  There were no vitals filed for this visit.  There is no height or weight on file to calculate BMI.    Physical Exam    Physical Exam:  Constitutional: The patient appears well-developed and well-nourished. No distress.   Head: Normocephalic and atraumatic.   Nose: Nose normal.   Eyes: Conjunctivae and EOM are normal.   Neck: No tracheal deviation present.   Cardiovascular: Normal rate and intact distal pulses.    Pulmonary/Chest: Effort normal. No respiratory distress.   Abdominal: There is no guarding.   Lymphatic: Negative for adenopathy   Neurological: The patient is alert.   Psychiatric: The patient has a normal mood and affect.     Bilateral Hand/Wrist Examination:    Observation/Inspection:  Swelling  Right wrist    Deformity  none  Discoloration  none     Scars   none    Atrophy  none    HAND/WRIST EXAMINATION:    Patient has about 40° of flexion and about 45° of extension of the right wrist, patient has normal range of motion of the left wrist.  Patient has some CMC grind and also has a positive Finkelstein's on the right hand.    Neurovascular Exam:  Digits WWP, brisk CR  < 3s throughout  NVI motor/LTS to M/R/U nerves, radial pulse 2+  2+ biceps and brachioradialis reflexes    Diagnostic Results:     X-rays AP, lateral and oblique right hand taken today are independently reviewed by me and shows SLAC wrist changes, Eaton stage II basilar thumb and IP joint arthritis.       CT wrist without contrast right 11.10.23 Impression:  1. 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  2. 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  3. Osseous excrescence at the dorsal aspect of the scaphoid with mass effect and displacement of the extensor carpi radialis brevis, possibly sequela of remote trauma (humpback deformity) or degenerative in nature.    ASSESSMENT/PLAN:      68 y.o. yo female with   Encounter Diagnoses   Name Primary?    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 I have given a right wrist brace for comfort.    -I have offered her a selective injection. I have explained the risks, benefits, and alternatives of the procedure in detail.  The patient voices understanding and all questions have been answered. The patient agrees to proceed as planned. So after a sterile prep of the skin in the normal fashion the right 1st dorsal extensor compartment and right wrist dorsally was injected using a 25 gauge needle with a combination of 1cc 1% plain lidocaine and 40 mg of methylprednisolone.  The patient is cautioned and immediate relief of pain is secondary to the local anesthetic and will be temporary.  After the anesthetic wears off there may be a increase in pain that may last for a few hours or a few days and they should use ice to help  alleviate this flair up of pain. Patient tolerated the procedure well.    - patient follow up virtually in 2-4 weeks to follow up progression    The patient's pathophysiology was explained in detail with reference to x-rays, models, other visual aids as appropriate.  Treatment options were discussed in detail.  Questions were invited and answered to the patient's satisfaction. I reviewed Primary care , and other specialty's notes to better coordinate patient's care.          Lynne Mccormack MD    Please be aware that this note has been generated with the assistance of MModal voice-to-text.  Please excuse any spelling or grammatical errors.

## 2024-01-07 RX ORDER — METHYLPREDNISOLONE ACETATE 40 MG/ML
40 INJECTION, SUSPENSION INTRA-ARTICULAR; INTRALESIONAL; INTRAMUSCULAR; SOFT TISSUE
Status: DISCONTINUED | OUTPATIENT
Start: 2023-12-20 | End: 2024-01-07 | Stop reason: HOSPADM

## 2024-01-07 NOTE — PROCEDURES
Tendon Sheath    Date/Time: 12/20/2023 10:00 AM    Performed by: Lynne Mccormack MD  Authorized by: Lynne Mccormack MD    Consent Done?:  Yes (Verbal)  Indications:  Pain  Prep: patient was prepped and draped in usual sterile fashion      Local anesthesia used?: Yes    Anesthesia:  Local infiltration  Local anesthetic:  Lidocaine 1% without epinephrine  Anesthetic total (ml):  1    Location:  Wrist  Site:  R first doral compartment  Needle size:  25 G  Approach:  Radial  Medications:  40 mg methylPREDNISolone acetate 40 mg/mL  Patient tolerance:  Patient tolerated the procedure well with no immediate complications  Intermediate Joint Aspiration/Injection: R radiocarpal    Date/Time: 12/20/2023 10:00 AM    Performed by: Lynne Mccormack MD  Authorized by: Lynne Mccormack MD    Consent Done?:  Yes (Verbal)  Indications:  Pain  Timeout: Prior to procedure the correct patient, procedure, and site was verified      Location:  Wrist  Site:  R radiocarpal  Prep: Patient was prepped and draped in usual sterile fashion    Needle size:  25 G  Medications:  40 mg methylPREDNISolone acetate 40 mg/mL  Patient tolerance:  Patient tolerated the procedure well with no immediate complications

## 2024-01-10 ENCOUNTER — OFFICE VISIT (OUTPATIENT)
Dept: ORTHOPEDICS | Facility: CLINIC | Age: 69
End: 2024-01-10
Payer: MEDICARE

## 2024-01-10 DIAGNOSIS — M19.131 SCAPHOLUNATE ADVANCED COLLAPSE OF RIGHT WRIST: Primary | ICD-10-CM

## 2024-01-10 DIAGNOSIS — G89.29 CHRONIC PAIN OF RIGHT WRIST: ICD-10-CM

## 2024-01-10 DIAGNOSIS — M25.531 CHRONIC PAIN OF RIGHT WRIST: ICD-10-CM

## 2024-01-10 DIAGNOSIS — M65.4 TENOSYNOVITIS, DE QUERVAIN: ICD-10-CM

## 2024-01-10 PROCEDURE — 99441 PR PHYSICIAN TELEPHONE EVALUATION 5-10 MIN: CPT | Mod: 95,,, | Performed by: ORTHOPAEDIC SURGERY

## 2024-01-10 NOTE — PROGRESS NOTES
Established Patient - Audio Only Telehealth Visit     The patient location is: [place]  The chief complaint leading to consultation is: f/u from right wrist pain  Visit type: Virtual visit with audio only (telephone)  Total time spent with patient: 5       The reason for the audio only service rather than synchronous audio and video virtual visit was related to technical difficulties or patient preference/necessity.     Each patient to whom I provide medical services by telemedicine is:  (1) informed of the relationship between the physician and patient and the respective role of any other health care provider with respect to management of the patient; and (2) notified that they may decline to receive medical services by telemedicine and may withdraw from such care at any time. Patient verbally consented to receive this service via voice-only telephone call.       HPI:  Patient states that the steroid injection at her visit in December resolved her wrist pain.  She states that she has no more pain and she is able to perform daily activities normally.     Assessment and plan:  Continue with conservative care.  Follow-up as needed.                        This service was not originating from a related E/M service provided within the previous 7 days nor will  to an E/M service or procedure within the next 24 hours or my soonest available appointment.  Prevailing standard of care was able to be met in this audio-only visit.

## 2024-01-25 ENCOUNTER — LAB VISIT (OUTPATIENT)
Dept: LAB | Facility: HOSPITAL | Age: 69
End: 2024-01-25
Attending: INTERNAL MEDICINE
Payer: MEDICARE

## 2024-01-25 DIAGNOSIS — E78.5 HYPERLIPIDEMIA, UNSPECIFIED HYPERLIPIDEMIA TYPE: ICD-10-CM

## 2024-01-25 LAB
ALBUMIN SERPL BCP-MCNC: 4.1 G/DL (ref 3.5–5.2)
ALP SERPL-CCNC: 56 U/L (ref 55–135)
ALT SERPL W/O P-5'-P-CCNC: 19 U/L (ref 10–44)
ANION GAP SERPL CALC-SCNC: 8 MMOL/L (ref 8–16)
AST SERPL-CCNC: 17 U/L (ref 10–40)
BILIRUB SERPL-MCNC: 0.3 MG/DL (ref 0.1–1)
BUN SERPL-MCNC: 16 MG/DL (ref 8–23)
CALCIUM SERPL-MCNC: 9.5 MG/DL (ref 8.7–10.5)
CHLORIDE SERPL-SCNC: 105 MMOL/L (ref 95–110)
CHOLEST SERPL-MCNC: 178 MG/DL (ref 120–199)
CHOLEST/HDLC SERPL: 2.2 {RATIO} (ref 2–5)
CO2 SERPL-SCNC: 27 MMOL/L (ref 23–29)
CREAT SERPL-MCNC: 0.6 MG/DL (ref 0.5–1.4)
EST. GFR  (NO RACE VARIABLE): >60 ML/MIN/1.73 M^2
GLUCOSE SERPL-MCNC: 92 MG/DL (ref 70–110)
HDLC SERPL-MCNC: 81 MG/DL (ref 40–75)
HDLC SERPL: 45.5 % (ref 20–50)
LDLC SERPL CALC-MCNC: 83.8 MG/DL (ref 63–159)
NONHDLC SERPL-MCNC: 97 MG/DL
POTASSIUM SERPL-SCNC: 4.3 MMOL/L (ref 3.5–5.1)
PROT SERPL-MCNC: 7.4 G/DL (ref 6–8.4)
SODIUM SERPL-SCNC: 140 MMOL/L (ref 136–145)
TRIGL SERPL-MCNC: 66 MG/DL (ref 30–150)

## 2024-01-25 PROCEDURE — 80053 COMPREHEN METABOLIC PANEL: CPT | Performed by: INTERNAL MEDICINE

## 2024-01-25 PROCEDURE — 80061 LIPID PANEL: CPT | Performed by: INTERNAL MEDICINE

## 2024-01-25 PROCEDURE — 36415 COLL VENOUS BLD VENIPUNCTURE: CPT | Performed by: INTERNAL MEDICINE

## 2024-02-06 ENCOUNTER — OFFICE VISIT (OUTPATIENT)
Dept: INTERNAL MEDICINE | Facility: CLINIC | Age: 69
End: 2024-02-06
Payer: MEDICARE

## 2024-02-06 VITALS
HEART RATE: 71 BPM | OXYGEN SATURATION: 96 % | SYSTOLIC BLOOD PRESSURE: 138 MMHG | DIASTOLIC BLOOD PRESSURE: 70 MMHG | WEIGHT: 132.5 LBS | HEIGHT: 62 IN | BODY MASS INDEX: 24.38 KG/M2

## 2024-02-06 DIAGNOSIS — Z78.0 ASYMPTOMATIC MENOPAUSAL STATE: ICD-10-CM

## 2024-02-06 DIAGNOSIS — E78.5 HYPERLIPIDEMIA, UNSPECIFIED HYPERLIPIDEMIA TYPE: Primary | ICD-10-CM

## 2024-02-06 PROCEDURE — 99999 PR PBB SHADOW E&M-EST. PATIENT-LVL III: CPT | Mod: PBBFAC,,, | Performed by: PHYSICIAN ASSISTANT

## 2024-02-06 PROCEDURE — 99213 OFFICE O/P EST LOW 20 MIN: CPT | Mod: S$GLB,,, | Performed by: PHYSICIAN ASSISTANT

## 2024-02-06 RX ORDER — ATORVASTATIN CALCIUM 20 MG/1
20 TABLET, FILM COATED ORAL DAILY
Qty: 90 TABLET | Refills: 3 | Status: SHIPPED | OUTPATIENT
Start: 2024-02-06 | End: 2025-02-05

## 2024-02-06 NOTE — PATIENT INSTRUCTIONS
Schedule bone density around 10/2024  Annual with Dr. Greco due 11/2024    The Scoop on Statins: Things You Should Know.       Heart disease is the number one cause of death in the [place]. Around 1 in 20 adults aged 20 and older have coronary artery disease [1] and every 33 seconds a person dies from cardiovascular disease in the [place] [2]. The good news is that you can reduce your risk.    What are statins and how do they work?    Statins are a medication that is effective at lowering cholesterol and your risk of heart attack and stroke.     Statins lower low-density lipoprotein (LDL) cholesterol known as the bad cholesterol. Over time, just like the pipes in your house, your arteries or pipes can become filled with waxy cholesterol plaques that can build up and block blood flow to your heart. This can lead to heart attacks and strokes. Statins can draw the cholesterol out of plaques and slow down the production of cholesterol in the liver.       Figure 1: Reproduced from: What Is Atherosclerosis? National Heart, Lung, and Blood [place]. Available at http://www.nhlbi.nih.gov/health/health-topics/topics/atherosclerosis/.    What are the benefits and potential risks of statins?    Statins can decrease the risk of major heart events by 31% and have a 21% decrease in death from heart disease [4]. In addition to lowering bad cholesterol, status increase your HDL or good cholesterol. They also are known to have anti-inflammatory properties, decrease the risk of blood clots, and improve the lining of blood vessels [5].     Statins are safe and effective for most people, but there are some rare side effects that impact a small number of patients. Most patients do not experience any side effects and up to 90% of patients do not experience muscle aches. Statins may also mildly increase glucose levels [6]. If a patient experiences side effects your doctor might try a different statin, lower dose, or  decrease the frequency.     There are some common misconceptions about the potential side effects and risks of taking statins which have been debunked in scientific studies. Common myths include statins having a negative effect on liver health and cognitive health. Scientific studies monitoring thousands of patients have shown that statins do not cause dementia. Use of statins can improve your long-term cognitive and overall health by reducing your risk of stroke and heart disease.     Who are prescribed statins?    Statins are used to treat patients with high cholesterol and many doctors prescribe statins for people at risk of heart disease and stroke. Statins are also recommended for patients with diabetes, those with elevated coronary calcium scores and patients with a history of stroke, heart attack, and other cardiovascular events.     Talk to your doctor about your risk and whether statins might be right for you.    References    1.   Josselyn CW, Amelia AW, Lara ZI, Andrea CAM, Melodie P, Jonatan CL, Bereket CM, Jeanmarie AZ, Jazz AK, Curtis AE, Spencer-Robert Y, Elmonique MSV, Minerva KR, Jonathan-Danny C, Fabricio S, Joey G, Leda DG, Hiremath S, Ho JE, Pamella R, Radha DS, Kendrick D, Eros DA, Ousmane J, Ma J, Magnani JW, Asia ED, Junior ME, Shazia SD, Bhumika NI, Kee R, Timmy M, Jesus GA, Awilda NS, [facility] MP, Lissette EL, Katie SS, Sandy JH, Drew NY, Reese ND, Reese SS, Magalys K, Leland SS; American Heart Association [place] on Epidemiology and Prevention Statistics Committee and Stroke Statistics Subcommittee. Heart Disease and Stroke Statistics-2023 Update: A Report From the American Heart Association. Circulation. 2023 Feb 21;147(8):y20-g984. doi: 10.1161/CIR.2509712235447950. Epub 2023 Jan 25. Erratum in: Circulation. 2023 Feb 21;147(8):e622. Erratum in: Circulation. 2023 Jul 25;148(4):e4. PMID: 63085750.  2. National Center for Health Statistics. Multiple Cause of Death 9447-1887 on [facility]  Database. Accessed October 28, 2023.  3. Bebe Adam R, Katty CARDOSO. Exploitation of Aspergillus terreus for the Production of Natural Statins. J Fungi ([facility]). 2016 Apr 30;2(2):13. doi: 10.3390/bfj2789707. PMID: 57851867; PMCID: APL4760787.  4. Lucio NAWAF, Thai J, Fidelia S. Effect of statins on risk of coronary disease: a meta-analysis of randomized controlled trials. DANYA. 1999 Dec 22-29;282(24):2340-6. doi: 10.1001/danya.282.24.2340. PMID: 38320722.  5. Noble-Lane I, Casal-Aba M, Margareth AL. Statins: pros and cons. Med Clin ([employer]). 2018 May 23;150(10):398-402. doi: 10.1016/j.medcli.2017.11.030. Epub 2017 Dec 29. PMID: 87808080; PMCID: NZV0644734.  6. Scott S, Frandy A, Frandy S. Statin Therapy: Review of Safety and Potential Side Effects. Acta Cardiol Sin. 2016 Nov;32(6):631-639. doi: 10.6515/pwm10418797j. PMID: 69678367; PMCID: MUI0211043.

## 2024-02-06 NOTE — PROGRESS NOTES
Subjective:       Patient ID: Diann Wasserman is a 68 y.o. female.        Chief Complaint: Follow-up    Diann Wasserman is an established patient of [place] Greco MD here today for 3 month f/u visit.    Lipids -   Started lipitor 40 mg 1/2 tablet 11/2023  Doing well on medication  Cholesterol much improved    Lab Results       Component                Value               Date                       CHOL                         178                 01/25/2024                 TRIG                            66                  01/25/2024                 HDL                            81 (H)              01/25/2024                 LDLCALC                   83.8                01/25/2024              Osteopenia -   DEXA 6/2022 f/u 2 years    BMI 24     Stays active but not formally exercising           Review of Systems   Constitutional:  Negative for chills, diaphoresis, fatigue and fever.   HENT:  Negative for congestion and sore throat.    Eyes:  Negative for visual disturbance.   Respiratory:  Negative for cough, chest tightness and shortness of breath.    Cardiovascular:  Negative for chest pain, palpitations and leg swelling.   Gastrointestinal:  Negative for abdominal pain, blood in stool, constipation, diarrhea, nausea and vomiting.   Genitourinary:  Negative for dysuria, frequency, hematuria and urgency.   Musculoskeletal:  Negative for arthralgias and back pain.   Skin:  Negative for rash.   Neurological:  Negative for dizziness, syncope, weakness and headaches.   Psychiatric/Behavioral:  Negative for dysphoric mood and sleep disturbance. The patient is not nervous/anxious.        Objective:      Physical Exam  Vitals and nursing note reviewed.   Constitutional:       Appearance: Normal appearance. She is well-developed.   HENT:      Head: Normocephalic.      Right Ear: External ear normal.      Left Ear: External ear normal.   Eyes:      Pupils: Pupils are equal, round, and reactive to  "light.   Cardiovascular:      Rate and Rhythm: Normal rate and regular rhythm.      Heart sounds: Normal heart sounds. No murmur heard.     No friction rub. No gallop.   Pulmonary:      Effort: Pulmonary effort is normal. No respiratory distress.      Breath sounds: Normal breath sounds.   Abdominal:      Palpations: Abdomen is soft.      Tenderness: There is no abdominal tenderness.   Skin:     General: Skin is warm and dry.   Neurological:      Mental Status: She is alert.         Assessment:       1. Hyperlipidemia, unspecified hyperlipidemia type    2. Asymptomatic menopausal state        Plan:       Diann was seen today for follow-up.    Diagnoses and all orders for this visit:    Hyperlipidemia, unspecified hyperlipidemia type  -     REFILL: atorvastatin (LIPITOR) 20 MG tablet; Take 1 tablet (20 mg total) by mouth once daily.  Lab Results   Component Value Date    CHOL 178 01/25/2024    TRIG 66 01/25/2024    HDL 81 (H) 01/25/2024    LDLCALC 83.8 01/25/2024     Asymptomatic menopausal state  -     DXA Bone Density Axial Skeleton 1 or more sites; Future    Schedule DEXA around 10/2024 and f/u with Dr. Greco for annual around 11/2024    Pt has been given instructions populated from patient instructions database and has verbalized understanding of the after visit summary and information contained wherein.    Follow up with a primary care provider. May go to ER for acute shortness of breath, lightheadedness, fever, or any other emergent complaints or changes in condition.    "This note will be shared with the patient"    No future appointments.              "

## 2024-06-03 ENCOUNTER — OFFICE VISIT (OUTPATIENT)
Dept: OPTOMETRY | Facility: CLINIC | Age: 69
End: 2024-06-03
Payer: COMMERCIAL

## 2024-06-03 DIAGNOSIS — D31.42 IRIS NEVUS, LEFT: ICD-10-CM

## 2024-06-03 DIAGNOSIS — H52.4 PRESBYOPIA OF BOTH EYES: ICD-10-CM

## 2024-06-03 DIAGNOSIS — H25.13 NUCLEAR SCLEROSIS OF BOTH EYES: ICD-10-CM

## 2024-06-03 DIAGNOSIS — Z01.00 EXAMINATION OF EYES AND VISION: Primary | ICD-10-CM

## 2024-06-03 DIAGNOSIS — H52.03 HYPEROPIA OF BOTH EYES WITH ASTIGMATISM: ICD-10-CM

## 2024-06-03 DIAGNOSIS — H52.203 HYPEROPIA OF BOTH EYES WITH ASTIGMATISM: ICD-10-CM

## 2024-06-03 PROCEDURE — 92015 DETERMINE REFRACTIVE STATE: CPT | Mod: S$GLB,,, | Performed by: OPTOMETRIST

## 2024-06-03 PROCEDURE — 99999 PR PBB SHADOW E&M-EST. PATIENT-LVL III: CPT | Mod: PBBFAC,,, | Performed by: OPTOMETRIST

## 2024-06-03 PROCEDURE — 92014 COMPRE OPH EXAM EST PT 1/>: CPT | Mod: S$GLB,,, | Performed by: OPTOMETRIST

## 2024-06-03 NOTE — PROGRESS NOTES
HPI     eye exam             Comments: Annual general eye exam and refraction.  Wears glasses full-time, except when wears CLs, but only wears CLs   occasionally.  Uses reading glasses over CLs, but would like CLs to help   with near vision so that she would not need reading glasses over the CL.s             Comments    Patient's age: 68 y.o. [place]  Occupation: retired/homemaker   Approximate date of last eye examination:  05/03/2023 Dr. Kaiser regarding   atypical nevus of the iris - was referred to [facility] (Dr. Rodriguez)  advised to monitor only. Recheck in 12 - 18 months    Last saw   02/23/2023  City/State: [facility]   Wears glasses? Yes      If yes, wears  Full-time or part-time?  Full-time   Present glasses are: Bifocal, SV Distance, SV Reading?  Bifocal   Approximate age of present glasses:  1 1/2  year    Got new glasses following last exam, or subsequently?:  yes    Any problem with VA with glasses?  no  Wears CLs?:  yes           If yes:              Type of CL worn:                Wears full-time or part-time:  part-time                Sleeps with contact lenses:  no               CL Solution used:  optic- free                How often replace CLs:  not sure               Any problem with VA with CLs?  No - but needs reading glasses   over CLs               Headaches?  no  Eye pain/discomfort?  no                                                                                     Flashes?  no  Floaters?  no  Diplopia/Double vision?  no  Patient's Ocular History:         Any eye surgeries? no         Any eye injury?  no         Any treatment for eye disease?  No - followed for atypical nevus of   iris in one eye (left eye)  Family history of eye disease?  no  Significant patient medical history:         1. Diabetes?  no       If yes, IDDM or NIDDM?   N/a   2. HBP?  no                ! OTC eyedrops currently using:  none   ! Prescription eye meds currently using:  no   ! Any history of  "allergy/adverse reaction to any eye meds used   previously?  no    ! Any history of allergy/adverse reaction to eyedrops used during prior   eye exam(s)? no    ! Any history of allergy/adverse reaction to Novacaine or similar meds?   no   ! Any history of allergy/adverse reaction to Epinephrine or similar meds?   no    ! Patient okay with use of anesthetic eyedrops to check eye pressure?    yes         ! Patient okay with use of eyedrops to dilate pupils today?  yes   !  Allergies/Medications/Medical History/Family History reviewed today?    yes      PD =   63/59  Desired reading distance =  17.5"                                                                        Last edited by Felix Lynch OD on 6/3/2024  9:09 AM.            Assessment /Plan     For exam results, see Encounter Report.    1. Examination of eyes and vision        2. Hyperopia of both eyes with astigmatism        3. Presbyopia of both eyes        4. Iris nevus, left        5. Nuclear sclerosis of both eyes                       Early nuclear sclerosis of lens of both eyes, and early peripheral cortical cataract in the right eye.  No need for cataract surgery in either eye at this time.    Followed at [facility] (Dr. Rodriguez) for atypical nevus of the left eye.  Being monitored only.  No treatment ever done.  Follow up with Dr. Rodriguez as he recommends.      Hyperopia with astigmatism in each  eye, and presbyopia consistent with age.  Best-corrected VA of 20/20-1 in the right eye and 20/20 (-) in the left eye.  Spectacle lens Rx issued.     Wears SCLs only occasionally.  Came in without CLs in.  Reports no problems with CLs, and showing no adverse effects of CL wear in either eye.  But interested in either monovision or Multifocal SCLs so as not to need reading glasses over Cls.   Defer new contact lens Rx for Biofinity Toric SCLs.     Return in a few days for contact lens follow up visit for trial fitting with monovision Biofinity Toric SCLs "      Repeat refraction in one yea

## 2024-06-03 NOTE — PATIENT INSTRUCTIONS
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  No need for cataract surgery in either eye at this time.    Followed at [facility] (Dr. Rodriguez) for atypical nevus of the left eye.  Being monitored only.  No treatment ever done.  Follow up with Dr. Rodriguez as he recommends.      Hyperopia with astigmatism in each  eye, and presbyopia consistent with age.  Best-corrected VA of 20/20-1 in the right eye and 20/20 (-) in the left eye.  Spectacle lens Rx issued.     Wears SCLs only occasionally.  Came in without CLs in.  Reports no problems with CLs, and showing no adverse effects of CL wear in either eye.  But interested in either monovision or Multifocal SCLs so as not to need reading glasses over Cls.   Defer new contact lens Rx for Biofinity Toric SCLs.     Return in a few days for contact lens follow up visit for trial fitting with monovision Biofinity Toric SCLs      Repeat refraction in one marlee

## 2024-06-05 ENCOUNTER — OFFICE VISIT (OUTPATIENT)
Dept: OPTOMETRY | Facility: CLINIC | Age: 69
End: 2024-06-05
Payer: MEDICARE

## 2024-06-05 DIAGNOSIS — Z46.0 ENCOUNTER FOR FITTING OR ADJUSTMENT OF SPECTACLES OR CONTACT LENSES: Primary | ICD-10-CM

## 2024-06-05 PROCEDURE — 92499 UNLISTED OPH SVC/PROCEDURE: CPT | Mod: CSM,S$GLB,, | Performed by: OPTOMETRIST

## 2024-06-05 PROCEDURE — 99499 UNLISTED E&M SERVICE: CPT | Mod: ,,, | Performed by: OPTOMETRIST

## 2024-06-06 NOTE — PROGRESS NOTES
Assessment /Plan     For exam results, see Encounter Report.    1. Encounter for fitting or adjustment of spectacles or contact lenses                         Ms. Wasserman in for trial fitting for soft contact lenses which would not require her to wear reading glasses over CLs.  Suggested trial with monovision SCLs.  Dispensed trial Biofinity Toric SCLs     OD 8.7  14.5   +2.50 -0.75 x 090  distance     OS 8.7   14.5  +5.00 -0.75 x 090  near    Daily wear.  Clean and soak lenses daily.  Return in two weeks for contact lens follow-up visit - or prior, if any problems noted in the interim

## 2024-06-06 NOTE — PATIENT INSTRUCTIONS
Ms. Wasserman in for trial fitting for soft contact lenses which would not require her to wear reading glasses over CLs.  Suggested trial with monovision SCLs.  Dispensed trial Biofinity Toric SCLs     OD 8.7  14.5   +2.50 -0.75 x 090  distance     OS 8.7   14.5  +5.00 -0.75 x 090  near    Daily wear.  Clean and soak lenses daily.  Return in two weeks for contact lens follow-up visit - or prior, if any problems noted in the interim

## 2024-06-10 ENCOUNTER — PATIENT MESSAGE (OUTPATIENT)
Dept: INTERNAL MEDICINE | Facility: CLINIC | Age: 69
End: 2024-06-10
Payer: MEDICARE

## 2024-06-19 ENCOUNTER — OFFICE VISIT (OUTPATIENT)
Dept: OPTOMETRY | Facility: CLINIC | Age: 69
End: 2024-06-19
Payer: MEDICARE

## 2024-06-19 DIAGNOSIS — Z46.0 ENCOUNTER FOR FITTING OR ADJUSTMENT OF SPECTACLES OR CONTACT LENSES: Primary | ICD-10-CM

## 2024-06-19 PROCEDURE — 99499 UNLISTED E&M SERVICE: CPT | Mod: ,,, | Performed by: OPTOMETRIST

## 2024-06-19 PROCEDURE — 92499 UNLISTED OPH SVC/PROCEDURE: CPT | Mod: CSM,S$GLB,, | Performed by: OPTOMETRIST

## 2024-06-20 NOTE — PATIENT INSTRUCTIONS
Ms. Wasserman wearing trial monovision Biofinity Toric SCLs - right for distance and left for near.  Good contact lens fit in each eye  Wearing CLs well in both eyes, without evidence of adverse effects of contact lens wear in either eye.  Over-refraction indicates need for power adjustment.     Order new trial Biofinity Toric lenses per over-refraction done over CLs today.      OD  8.7 14.5   +2.50 -1.25 x 090  distance       OS  8.7  14.5  +5.00 -0.75 x 090    Mail new trial lenses to Ms Wasserman.  She is to wear the new trial lenses for a few days, and then call/email with report on satisfaction with the new trial lenses.  If happy with the new trial lenses, will finalize the CL Rx and will send the Rx to her.  However, if any problems noted, will have her return for an additional contact lens follow-up visit.

## 2024-06-21 NOTE — PROGRESS NOTES
HPI     Contact Lens Follow Up            Comments: In for contact lens follow up visit.  Wearing trial monovision Biofinity Toric SCls  Refer contact lens section of record.          Comments    Patient in for contact lens follow up    Contact lenses patient currently wearing:            Right Trial Biofinity Toric 8.7 14.5 +2.50 -0.75 x 090 distance   Left Trial Biofinity Toric 8.70 14.5 +5.00 -0.75 x 090 Near  Lens comfort okay OD and OS   Happy with binocular near VA  Feels that binocular distance VA could be better.                                                                                  Patient's report on visual acuity with contact lenses:  Distance:  Fine                                                                                          Near: Still need changing, can't see clearly far out.     Any problems with Contact Lens comfort?  Good, but OD has some discomfort   in the corner area.     Contact lens wearing time (hours/per day): 8 am to 9 pm at night    How long have Contact Lenses been in today?  8 hrs     Does patient sleep with the contact lenses in?  No    Contact lens care system/solution used?  Opti Free             Last edited by Felix Lynch, OD on 6/21/2024  7:06 AM.            Assessment /Plan     For exam results, see Encounter Report.    1. Encounter for fitting or adjustment of spectacles or contact lenses                       Ms. Wasserman wearing trial monovision Biofinity Toric SCLs - right for distance and left for near.  Good contact lens fit in each eye  Wearing CLs well in both eyes, without evidence of adverse effects of contact lens wear in either eye.  Over-refraction indicates need for power adjustment.     Order new trial Biofinity Toric lenses per over-refraction done over CLs today.      OD  8.7 14.5   +2.50 -1.25 x 090  distance       OS  8.7  14.5  +5.00 -0.75 x 090    Mail new trial lenses to Ms Wasserman.  She is to wear the new trial lenses for  a few days, and then call/email with report on satisfaction with the new trial lenses.  If happy with the new trial lenses, will finalize the CL Rx and will send the Rx to her.  However, if any problems noted, will have her return for an additional contact lens follow-up visit.

## 2024-06-23 ENCOUNTER — PATIENT MESSAGE (OUTPATIENT)
Dept: OPTOMETRY | Facility: CLINIC | Age: 69
End: 2024-06-23
Payer: MEDICARE

## 2024-07-01 ENCOUNTER — HOSPITAL ENCOUNTER (OUTPATIENT)
Dept: RADIOLOGY | Facility: CLINIC | Age: 69
Discharge: HOME | End: 2024-07-01
Attending: PHYSICIAN ASSISTANT
Payer: MEDICARE

## 2024-07-01 DIAGNOSIS — Z78.0 ASYMPTOMATIC MENOPAUSAL STATE: ICD-10-CM

## 2024-07-01 PROCEDURE — 77080 DXA BONE DENSITY AXIAL: CPT | Mod: TC

## 2024-07-02 ENCOUNTER — TELEPHONE (OUTPATIENT)
Dept: OPHTHALMOLOGY | Facility: CLINIC | Age: 69
End: 2024-07-02
Payer: MEDICARE

## 2024-07-02 ENCOUNTER — PATIENT MESSAGE (OUTPATIENT)
Dept: OPTOMETRY | Facility: CLINIC | Age: 69
End: 2024-07-02
Payer: MEDICARE

## 2024-07-02 NOTE — TELEPHONE ENCOUNTER
----- Message from Jazmín Menard sent at 7/2/2024 10:04 AM CDT -----  This patient picked up her trials today.    Jazmín

## 2024-07-11 ENCOUNTER — OFFICE VISIT (OUTPATIENT)
Dept: INTERNAL MEDICINE | Facility: CLINIC | Age: 69
End: 2024-07-11
Payer: MEDICARE

## 2024-07-11 ENCOUNTER — HOSPITAL ENCOUNTER (OUTPATIENT)
Dept: RADIOLOGY | Facility: HOSPITAL | Age: 69
Discharge: HOME | End: 2024-07-11
Attending: PHYSICIAN ASSISTANT
Payer: MEDICARE

## 2024-07-11 VITALS
SYSTOLIC BLOOD PRESSURE: 128 MMHG | HEIGHT: 62 IN | BODY MASS INDEX: 25.13 KG/M2 | WEIGHT: 136.56 LBS | HEART RATE: 70 BPM | OXYGEN SATURATION: 97 % | DIASTOLIC BLOOD PRESSURE: 70 MMHG

## 2024-07-11 DIAGNOSIS — M54.16 LUMBAR RADICULOPATHY: ICD-10-CM

## 2024-07-11 DIAGNOSIS — M54.16 LUMBAR RADICULOPATHY: Primary | ICD-10-CM

## 2024-07-11 PROCEDURE — 72100 X-RAY EXAM L-S SPINE 2/3 VWS: CPT | Mod: 26,,, | Performed by: RADIOLOGY

## 2024-07-11 PROCEDURE — 3288F FALL RISK ASSESSMENT DOCD: CPT | Mod: CPTII,S$GLB,, | Performed by: PHYSICIAN ASSISTANT

## 2024-07-11 PROCEDURE — 1160F RVW MEDS BY RX/DR IN RCRD: CPT | Mod: CPTII,S$GLB,, | Performed by: PHYSICIAN ASSISTANT

## 2024-07-11 PROCEDURE — 1159F MED LIST DOCD IN RCRD: CPT | Mod: CPTII,S$GLB,, | Performed by: PHYSICIAN ASSISTANT

## 2024-07-11 PROCEDURE — 1125F AMNT PAIN NOTED PAIN PRSNT: CPT | Mod: CPTII,S$GLB,, | Performed by: PHYSICIAN ASSISTANT

## 2024-07-11 PROCEDURE — 3008F BODY MASS INDEX DOCD: CPT | Mod: CPTII,S$GLB,, | Performed by: PHYSICIAN ASSISTANT

## 2024-07-11 PROCEDURE — 3078F DIAST BP <80 MM HG: CPT | Mod: CPTII,S$GLB,, | Performed by: PHYSICIAN ASSISTANT

## 2024-07-11 PROCEDURE — 3074F SYST BP LT 130 MM HG: CPT | Mod: CPTII,S$GLB,, | Performed by: PHYSICIAN ASSISTANT

## 2024-07-11 PROCEDURE — 72100 X-RAY EXAM L-S SPINE 2/3 VWS: CPT | Mod: TC

## 2024-07-11 PROCEDURE — 99999 PR PBB SHADOW E&M-EST. PATIENT-LVL V: CPT | Mod: PBBFAC,,, | Performed by: PHYSICIAN ASSISTANT

## 2024-07-11 PROCEDURE — 1101F PT FALLS ASSESS-DOCD LE1/YR: CPT | Mod: CPTII,S$GLB,, | Performed by: PHYSICIAN ASSISTANT

## 2024-07-11 PROCEDURE — 99213 OFFICE O/P EST LOW 20 MIN: CPT | Mod: S$GLB,,, | Performed by: PHYSICIAN ASSISTANT

## 2024-07-11 RX ORDER — GABAPENTIN 100 MG/1
CAPSULE ORAL
Qty: 90 CAPSULE | Refills: 1 | Status: SHIPPED | OUTPATIENT
Start: 2024-07-11

## 2024-07-11 RX ORDER — MELOXICAM 7.5 MG/1
7.5 TABLET ORAL DAILY
Qty: 30 TABLET | Refills: 1 | Status: SHIPPED | OUTPATIENT
Start: 2024-07-11

## 2024-07-11 NOTE — PROGRESS NOTES
Subjective:       Patient ID: Diann Wasserman is a 68 y.o. female.        Chief Complaint: Hip Pain, Knee Pain, and Back Pain    Diann Wasserman is an established patient of Virginia Greco MD here today for urgent care visit.    Left sided low back pain that radiates to left buttocks, groin, thigh, and knee.  Off/on for the past 6-12 months.  Severity varies.  She has been taking 2 advil at night to help her sleep through the pain.  If she's up and standing for any prolonged period it's very painful.  Also painful after prolonged sitting.  No numbness, tingling, or weakness.  No loss of bowel or bladder.      No weight loss.  No fevers.    Wt Readings from Last 4 Encounters:  07/11/24 : 62 kg (136 lb 9.2 oz)  02/06/24 : 60.1 kg (132 lb 7.9 oz)  11/06/23 : 60.2 kg (132 lb 11.5 oz)  06/15/23 : 60.5 kg (133 lb 6.1 oz)    Lipids -   Lipitor    Osteopenia    BMI 24           Review of Systems   Constitutional:  Negative for chills, diaphoresis, fatigue and fever.   HENT:  Negative for congestion and sore throat.    Eyes:  Negative for visual disturbance.   Respiratory:  Negative for cough, chest tightness and shortness of breath.    Cardiovascular:  Negative for chest pain, palpitations and leg swelling.   Gastrointestinal:  Negative for abdominal pain, blood in stool, constipation, diarrhea, nausea and vomiting.   Genitourinary:  Negative for dysuria, frequency, hematuria and urgency.   Musculoskeletal:  Positive for back pain. Negative for arthralgias.   Skin:  Negative for rash.   Neurological:  Negative for dizziness, syncope, weakness and headaches.   Psychiatric/Behavioral:  Negative for dysphoric mood and sleep disturbance. The patient is not nervous/anxious.        Objective:      Physical Exam  Vitals and nursing note reviewed.   Constitutional:       Appearance: Normal appearance. She is well-developed.   HENT:      Head: Normocephalic.      Right Ear: External ear normal.      Left Ear:  "External ear normal.   Eyes:      Pupils: Pupils are equal, round, and reactive to light.   Cardiovascular:      Rate and Rhythm: Normal rate and regular rhythm.      Heart sounds: Normal heart sounds. No murmur heard.     No friction rub. No gallop.   Pulmonary:      Effort: Pulmonary effort is normal. No respiratory distress.      Breath sounds: Normal breath sounds.   Abdominal:      Palpations: Abdomen is soft.      Tenderness: There is no abdominal tenderness.   Musculoskeletal:      Lumbar back: Spasms and tenderness present. Decreased range of motion. Positive left straight leg raise test. Negative right straight leg raise test.   Skin:     General: Skin is warm and dry.   Neurological:      Mental Status: She is alert.         Assessment:       1. Lumbar radiculopathy        Plan:       Diann was seen today for hip pain, knee pain and back pain.    Diagnoses and all orders for this visit:    Lumbar radiculopathy  -     X-Ray Lumbar Spine Ap And Lateral; Future  -     Ambulatory referral/consult to Physical/Occupational Therapy; Future  -     meloxicam (MOBIC) 7.5 MG tablet; Take 1 tablet (7.5 mg total) by mouth once daily.  -     gabapentin (NEURONTIN) 100 MG capsule; 1-3 tablets nightly as needed    Check x-ray today  Refer for PT  Start meloxicam and gabapentin  She defers muscle relaxer  If not improving in 6 weeks check MRI and refer to pain clinic  Return sooner if worsening or changing sx    Pt has been given instructions populated from patient instructions database and has verbalized understanding of the after visit summary and information contained wherein.    Follow up with a primary care provider. May go to ER for acute shortness of breath, lightheadedness, fever, or any other emergent complaints or changes in condition.    "This note will be shared with the patient"    Future Appointments   Date Time Provider Department Center   7/11/2024  2:45 PM [facility] [id number] LB LIMIT [facility] XRAY IM David Allen " PCW   8/13/2024  8:40 AM Juany Felix MD [facility] David cathy

## 2024-07-11 NOTE — PATIENT INSTRUCTIONS
Physical Therapy/Occupational Therapy number to schedule appointments - [phone].  Back Exercise to Keep Fit for Low Back Pain  To help in your recovery and to prevent further back problems, keep your back, abdominal muscles and legs strong. Walk daily as soon as you can. Gradually add other physical activities such as swimming and biking, which can help improve lower back strength. Begin as soon as you can do them comfortably. Do not do any exercises that make your pain a lot worse. The following are some back exercises that can help relieve low back pain.     Pelvic tilt   Repeat 5-10 times, twice per day.  Lie flat on your back (or stand with your back to a wall), knees bent, feet flat on the floor, body relaxed. Tighten your abdominal and buttock muscles, and tilt your pelvis. The curve of the small of your back should flatten towards the floor (or wall). Hold 10 seconds and then relax.       Knee raise     Repeat 5-10 times, twice per day.    Lie flat on your back, knees bent. Bring one knee slowly to your chest. Hug your knee gently. Then lower your leg toward the floor, keeping your knee bent. Do not straighten your legs. Repeat exercise with your other leg.              Partial press-up     Lie face down on a soft, firm surface. Do not turn your head to either side. Rest your arms bent at the elbows alongside your body. Relax for a few minutes. Then raise your upper body enough to lean on your elbows. Relax your lower back and legs as much as possible. Hold this position for 30 seconds at first. Gradually work up to five minutes. Or try slow press-ups. Hold each for five seconds, and repeat five to six times.              Copyright © 2012 by [employer]   Nimesh REBOLLAR, Eber COX, Eh HAMMER, Amparo B, Chano R, Chirag B, Darinel K, Domingo C, Maria A B, Hussain S, Ankit L, Anirudh R. [employer] [employer]. Adult Acute and Subacute Low Back Pain. Updated  November 2012.

## 2024-07-18 ENCOUNTER — CLINICAL SUPPORT (OUTPATIENT)
Dept: REHABILITATION | Facility: HOSPITAL | Age: 69
End: 2024-07-18
Payer: MEDICARE

## 2024-07-18 DIAGNOSIS — M25.551 BILATERAL HIP PAIN: ICD-10-CM

## 2024-07-18 DIAGNOSIS — M54.16 LUMBAR RADICULOPATHY: ICD-10-CM

## 2024-07-18 DIAGNOSIS — M54.50 LUMBAR PAIN: Primary | ICD-10-CM

## 2024-07-18 DIAGNOSIS — M25.552 BILATERAL HIP PAIN: ICD-10-CM

## 2024-07-18 PROCEDURE — 97112 NEUROMUSCULAR REEDUCATION: CPT

## 2024-07-18 PROCEDURE — 97161 PT EVAL LOW COMPLEX 20 MIN: CPT

## 2024-07-18 NOTE — PLAN OF CARE
"[facility]   Physical Therapy Initial Evaluation      Name: Diann DIAS [facility]  Clinic Number: [id number]    Therapy Diagnosis:   Encounter Diagnoses   Name Primary?    Lumbar radiculopathy     Lumbar pain Yes    Bilateral hip pain         Physician: Stephanie Anderson, FERNANDA    Physician Orders: PT Eval and Treat   Medical Diagnosis from Referral:   M54.16 (ICD-10-CM) - Lumbar radiculopathy   Evaluation Date: 7/18/2024  Authorization Period Expiration: TBD  Plan of Care Expiration: 10/18/2024  Progress Note Due: 8/18/2024  Visit # / Visits authorized: 1/ 1   FOTO: 1/ 3    Precautions: Standard     Time In: 8:30am  Time Out: 9:10am  Total Billable Time: 40 minutes    Subjective     Date of onset: 9 months ago    History of current condition - Diann reports: she thinks she having sciatic pain. She is having stiffness in the L leg that is causing extreme pain when moving it. Standing too long causes pain in the back and back of hip. She has extreme pain with lifting anything so the doctor told her not to. She tried doing exercises on her own before the doctor that increased her pain. It has been worsening since then. No pain on R side. No past sciatic pain. She denies any DANELLE. She denies any pain moving upwards on the back. She denies numbness and tingling. She feels like she's walking different and that's messing with her knee. She is getting groin pain especially when rotating leg to step out to the side. She is having difficulty sleeping but the medications are helping.     Falls: none recently    Imaging: Lumbar X-ray: Per EMR "   Impression:     Facet joint degenerative change at the lower lumbar spine.     Degenerative change of the bilateral sacroiliac joints and left hip joint."    Prior Therapy: none  Social History: lives in [facility] but doesn't use them much  Occupation: not working  Prior Level of Function: independent  Current Level of Function: independent with difficulty " lifting, carrying, squatting    Pain:  Current 2/10, worst 8/10, best 2/10   Location: left hip, back, groin (B back)  Description: Aching, Tight, Sharp, and stiffness  Aggravating Factors: bed mobility, walking, lifting,   Easing Factors: a little walking, medications    Patients goals: to get back to normal and exercise (yoga, pilates, and weight lifting) without being in pain     Medical History:   Past Medical History:   Diagnosis Date    Arthritis     Cataract     Iris nevus, left     Right hip pain        Surgical History:   Diann Wasserman  has a past surgical history that includes Cholecystectomy (); Hip surgery (2014); Joint replacement; Tubal ligation; Cosmetic surgery (); Colonoscopy (N/A, 2016); Breast surgery (Bilateral); and Total Reduction Mammoplasty.    Medications:   Diann has a current medication list which includes the following prescription(s): atorvastatin, gabapentin, meloxicam, multivit-min/iron/folic/wbk282, multivitamin, and turmeric.    Allergies:   Review of patient's allergies indicates:   Allergen Reactions    Erythromycin Rash    Penicillins Rash and Other (See Comments)     Redness        Objective        Lumbar AROM: Pain/Dysfunction with Movement: !=pain   Flexion: 62 degrees Hamstring stretch    Extension: 12 degrees Most painful motion   Right side bendin degrees Mild pain   Left side bendin degrees pain   Right rotation: 30% limited stiffness   Left rotation: 15% limited       Right Left Comment: !=pain   Hip Flexion: 4-/5 4/5    Hip ABD: 4-/5 4-/5    Hip ADD: 4/5 4/5    Hip Extension: 4-/5 4-/5 Dec'd glute activation   Hip IR: 4-/5 4/5    Hip ER: 4-/5 4/5         Right Left Comment ! = pain   Knee extension: 4+/5 4/5    Knee flexion: 4+/5 4+/5    Ankle DF: 4+/5 4+/5           Neural Tension Positive/Negative   Slump w/ DF  positive     Lumbar lock:    Prone hip ext motor control: dec'd glute activation    Prone Knee Bend (Femoral  N):    Sacroiliac Screen:    - Distraction   - Thigh Thrust: negative   - Gaenslen   - Sacral Thrust: positive   - Compression    Palpation: TTP at SIJ    Joint mobility: dec'd t/o lumbar spine    KWAKU: positive    FADIR: positive    90/90 Hamstring test:  WNL    Intake Outcome Measure for FOTO Survey    Therapist reviewed FOTO scores for Diann Wasserman on 7/18/2024.   FOTO report - see Media section or FOTO account episode details.    Intake Score: 47%         Treatment     Total Treatment time (time-based codes) separate from Evaluation: 8 minutes     Diann received the treatments listed below:      neuromuscular re-education activities to improve: Coordination, Posture, and Motor Control for 8 minutes. The following activities were included:  Review and demonstration of HEP including the following:  TrA activations 3x10 - review only  SL clamshell 1x1' B  Supine hip abd iso 3x10, 3s    Next session:  Hip add  Bridges  TrA progressions  Pallof  Standing hip ext/abd      Patient Education and Home Exercises     Education provided:   - Role of PT  - PT POC  - HEP    Written Home Exercises Provided: yes. Exercises were reviewed and Diann was able to demonstrate them prior to the end of the session.  Diann demonstrated good  understanding of the education provided. See EMR under Patient Instructions for exercises provided during therapy sessions.    Assessment     Diann is a 68 y.o. female referred to outpatient Physical Therapy with a medical diagnosis of lumbar radiculopathy. Patient presents with Symptoms consistent with medical diagnoses and impairments that are effecting their daily life. Neural tension testing is positive, but she also presents with symptoms consistent with hip arthropathy which could be due to the dec'd glute firing noted in special testing. She is impaired in lumbar ROM, LE strength, activity tolerance, joint mobility, neural tension, and functional mobility.     Patient  prognosis is Good.   Patient will benefit from skilled outpatient Physical Therapy to address the deficits stated above and in the chart below, provide patient /family education, and to maximize patientt's level of independence.     Plan of care discussed with patient: Yes  Patient's spiritual, cultural and educational needs considered and patient is agreeable to the plan of care and goals as stated below:     Anticipated Barriers for therapy: chronicity, pain    Medical Necessity is demonstrated by the following  History  Co-morbidities and personal factors that may impact the plan of care [] LOW: no personal factors / co-morbidities  [x] MODERATE: 1-2 personal factors / co-morbidities  [] HIGH: 3+ personal factors / co-morbidities    Moderate / High Support Documentation:   Co-morbidities affecting plan of care: see history above    Personal Factors:   no deficits     Examination  Body Structures and Functions, activity limitations and participation restrictions that may impact the plan of care [] LOW: addressing 1-2 elements  [x] MODERATE: 3+ elements  [] HIGH: 4+ elements (please support below)    Moderate / High Support Documentation: pain, ROM, strength     Clinical Presentation [x] LOW: stable  [] MODERATE: Evolving  [] HIGH: Unstable     Decision Making/ Complexity Score: low       Goals:  Short Term Goals (4 Weeks):  1. Pt will be compliant with HEP to supplement PT in decreasing pain with functional mobility.  2. Pt will perform pallof press with good control to demonstrate improved core strength.  3. Pt will improve lumbar ext ROM by 5 degrees to improve functional mobility.  4. Pt will improve impaired LE MMTs by 1/2 score to improve strength for functional tasks.  Long Term Goals (8 Weeks):   1. Pt will improve FOTO score to </= 60% limited to decrease perceived limitation with maintaining/changing body position.   2. Pt will perform floor to waist lift with good control to demonstrate improved core  strength.  3. Pt will improve impaired LE MMTs by 1 score to improve strength for functional tasks.  4. Pt will report no pain during lumbar ROM to promote functional mobility.     Plan     Plan of care Certification: 7/18/2024 to 10/18/2024.    Outpatient Physical Therapy 2 times weekly for 12 weeks to include the following interventions: Cervical/Lumbar Traction, Electrical Stimulation, Gait Training, Manual Therapy, Moist Heat/ Ice, Neuromuscular Re-ed, Therapeutic Activities, Therapeutic Exercise, Ultrasound, and dry needling, IASTM, and kinesiotaping PRN.       Star Sandoval, RICKY        Physician's Signature: _________________________________________ Date: ________________

## 2024-07-25 ENCOUNTER — CLINICAL SUPPORT (OUTPATIENT)
Dept: REHABILITATION | Facility: HOSPITAL | Age: 69
End: 2024-07-25
Payer: MEDICARE

## 2024-07-25 DIAGNOSIS — M25.552 BILATERAL HIP PAIN: ICD-10-CM

## 2024-07-25 DIAGNOSIS — M54.16 LUMBAR RADICULOPATHY: Primary | ICD-10-CM

## 2024-07-25 DIAGNOSIS — M54.50 LUMBAR PAIN: ICD-10-CM

## 2024-07-25 DIAGNOSIS — M25.551 BILATERAL HIP PAIN: ICD-10-CM

## 2024-07-25 PROCEDURE — 97530 THERAPEUTIC ACTIVITIES: CPT

## 2024-07-25 PROCEDURE — 97112 NEUROMUSCULAR REEDUCATION: CPT

## 2024-07-25 NOTE — PROGRESS NOTES
"[facility]   Physical Therapy Treatment Note      [facility]  Clinic Number: [id number]    Therapy Diagnosis:   Encounter Diagnoses   Name Primary?    Lumbar radiculopathy Yes    Lumbar pain     Bilateral hip pain      Physician: Stephanie Anderson PA-C    Visit Date: 7/25/2024    Physician Orders: PT Eval and Treat   Medical Diagnosis from Referral:   M54.16 (ICD-10-CM) - Lumbar radiculopathy   Evaluation Date: 7/18/2024  Authorization Period Expiration: 12/31/2024  Plan of Care Expiration: 10/18/2024  Progress Note Due: 8/18/2024  Visit # / Visits authorized: 1/1, 1/10  FOTO: 1/ 3     Precautions: Standard      Time In: 2:32pm  Time Out: 3:09 pm  Total Billable Time: 37 minutes    PTA Visit #: 0/5       Subjective     Patient reports: she is feeling a little better. She has been doing her exercises twice per day. She has also stopped taking her meds for the last two days because it's been bothering her stomach.  She was compliant with home exercise program.  Response to previous treatment: improved symptoms  Functional change: none reported    Pain: not rated "not too bad"  Location: bilateral back  and buttocks      Objective      Objective Measures updated at progress report unless specified.     Treatment     Diann received the treatments listed below:      therapeutic exercises to develop ROM and flexibility for 4 minutes including:  Modified piriformis stretch 5x15s B - D/C next session d/t inc'd pain B    neuromuscular re-education activities to improve: Coordination, Proprioception, Posture, and Motor control for 20 minutes. The following activities were included:  TrA activations    - with SB 2x10   - obliques with SB  SL clamshell 1x1' B  SL hip abd 1x1' B  Supine hip abd iso 3x10, 3s  Hip add ball squeeze 3x10, 3s  Bridges 3x10      therapeutic activities to improve functional performance for 13  minutes, including:  Pallof green theraband 2x10 B  SAPD with " TrA activations 2x10, 3s  Standing hip ext/abd 2# 2x10 B each  Seated fwd press 4# 2x10      Patient Education and Home Exercises       Education provided:   - HEP maintenance    Written Home Exercises Provided: Patient instructed to cont prior HEP. Exercises were reviewed and Diann was able to demonstrate them prior to the end of the session.  Diann demonstrated good  understanding of the education provided. See Electronic Medical Record under Patient Instructions for exercises provided during therapy sessions    Assessment     Pt tolerated first session after initial evaluation well today with no adverse effects. Session with emphasis on hip and core strength and stability. Pt with inc'd pain after piriformis stretch so plan to discharge that for future sessions to avoid increasing pain.     Diann Is progressing well towards her goals.   Patient prognosis is Good.   Patient will benefit from skilled outpatient Physical Therapy to address the deficits stated above and in the chart below, provide patient /family education, and to maximize patientt's level of independence.      Plan of care discussed with patient: Yes  Patient's spiritual, cultural and educational needs considered and patient is agreeable to the plan of care and goals as stated below:      Anticipated Barriers for therapy: chronicity, pain    Goals:   Short Term Goals (4 Weeks):  1. Pt will be compliant with HEP to supplement PT in decreasing pain with functional mobility.  2. Pt will perform pallof press with good control to demonstrate improved core strength.  3. Pt will improve lumbar ext ROM by 5 degrees to improve functional mobility.  4. Pt will improve impaired LE MMTs by 1/2 score to improve strength for functional tasks.  Long Term Goals (8 Weeks):   1. Pt will improve FOTO score to </= 60% limited to decrease perceived limitation with maintaining/changing body position.   2. Pt will perform floor to waist lift with good control to  demonstrate improved core strength.  3. Pt will improve impaired LE MMTs by 1 score to improve strength for functional tasks.  4. Pt will report no pain during lumbar ROM to promote functional mobility.     Plan     Plan of care Certification: 7/18/2024 to 10/18/2024.     Outpatient Physical Therapy 2 times weekly for 12 weeks to include the following interventions: Cervical/Lumbar Traction, Electrical Stimulation, Gait Training, Manual Therapy, Moist Heat/ Ice, Neuromuscular Re-ed, Therapeutic Activities, Therapeutic Exercise, Ultrasound, and dry needling, IASTM, and kinesiotaping PRN.        Star Sandoval, PT

## 2024-07-29 ENCOUNTER — CLINICAL SUPPORT (OUTPATIENT)
Dept: REHABILITATION | Facility: HOSPITAL | Age: 69
End: 2024-07-29
Payer: MEDICARE

## 2024-07-29 DIAGNOSIS — M25.552 BILATERAL HIP PAIN: ICD-10-CM

## 2024-07-29 DIAGNOSIS — M25.551 BILATERAL HIP PAIN: ICD-10-CM

## 2024-07-29 DIAGNOSIS — M54.50 LUMBAR PAIN: ICD-10-CM

## 2024-07-29 DIAGNOSIS — M54.16 LUMBAR RADICULOPATHY: Primary | ICD-10-CM

## 2024-07-29 PROCEDURE — 97112 NEUROMUSCULAR REEDUCATION: CPT

## 2024-07-29 PROCEDURE — 97530 THERAPEUTIC ACTIVITIES: CPT

## 2024-07-29 NOTE — PROGRESS NOTES
[facility]   Physical Therapy Treatment Note      [facility]  Clinic Number: [id number]    Therapy Diagnosis:   Encounter Diagnoses   Name Primary?    Lumbar radiculopathy Yes    Lumbar pain     Bilateral hip pain        Physician: Stephanie Anderson PA-C    Visit Date: 7/29/2024    Physician Orders: PT Eval and Treat   Medical Diagnosis from Referral:   M54.16 (ICD-10-CM) - Lumbar radiculopathy   Evaluation Date: 7/18/2024  Authorization Period Expiration: 12/31/2024  Plan of Care Expiration: 10/18/2024  Progress Note Due: 8/18/2024  Visit # / Visits authorized: 1/1, 2/10  FOTO: 1/ 3     Precautions: Standard      Time In: 9:15am  Time Out: 9:55am  Total Billable Time: 40 minutes    PTA Visit #: 0/5       Subjective     Patient reports: she felt good after last session. She went on a long boat ride the next day and got a little sore from that.  She was compliant with home exercise program.  Response to previous treatment: improved symptoms  Functional change: none reported    Pain: 0/10  Location: bilateral back  and buttocks      Objective      Objective Measures updated at progress report unless specified.     Treatment     Diann received the treatments listed below:      therapeutic exercises to develop ROM and flexibility for 02 minutes including:  Seated hip adductor stretch 5x15s B    Next session: SLR      neuromuscular re-education activities to improve: Coordination, Proprioception, Posture, and Motor control for 23 minutes. The following activities were included:  TrA activations    - with SB x10   - obliques with SB  SL clamshell 1x1' B  SL hip abd 1x1' B  Supine hip abd iso 3x10, 3s  Bridges 3x10 - VC to monitor hamstring activation  Hip add ball squeeze 3x10, 3s  HEP review    therapeutic activities to improve functional performance for 15  minutes, including:  Pallof green theraband 2x10 B  SAPD with TrA activations 2x10, 3s  Standing hip ext/abd 2# 2x10 B  each  Sit to stand fwd press 4# x6 (held after 6 due to pain)        Patient Education and Home Exercises       Education provided:   - HEP maintenance    Written Home Exercises Provided: Patient instructed to cont prior HEP. Exercises were reviewed and Diann was able to demonstrate them prior to the end of the session.  Diann demonstrated good  understanding of the education provided. See Electronic Medical Record under Patient Instructions for exercises provided during therapy sessions    Assessment     Continued with hip and core strength and stability. Attempted to progress dynamic strengthening with sit to stand added to fwd press but only able to complete x6 before L groin pain halted any further reps. Added seated adductor stretch to address this as pt subjectively reports this groin pain as something she consistently deals with and is now more bothersome than the hip/back pain.     Diann Is progressing well towards her goals.   Patient prognosis is Good.   Patient will benefit from skilled outpatient Physical Therapy to address the deficits stated above and in the chart below, provide patient /family education, and to maximize patientt's level of independence.      Plan of care discussed with patient: Yes  Patient's spiritual, cultural and educational needs considered and patient is agreeable to the plan of care and goals as stated below:      Anticipated Barriers for therapy: chronicity, pain    Goals:   Short Term Goals (4 Weeks):  1. Pt will be compliant with HEP to supplement PT in decreasing pain with functional mobility.  2. Pt will perform pallof press with good control to demonstrate improved core strength.  3. Pt will improve lumbar ext ROM by 5 degrees to improve functional mobility.  4. Pt will improve impaired LE MMTs by 1/2 score to improve strength for functional tasks.  Long Term Goals (8 Weeks):   1. Pt will improve FOTO score to </= 60% limited to decrease perceived limitation with  maintaining/changing body position.   2. Pt will perform floor to waist lift with good control to demonstrate improved core strength.  3. Pt will improve impaired LE MMTs by 1 score to improve strength for functional tasks.  4. Pt will report no pain during lumbar ROM to promote functional mobility.     Plan     Plan of care Certification: 7/18/2024 to 10/18/2024.     Outpatient Physical Therapy 2 times weekly for 12 weeks to include the following interventions: Cervical/Lumbar Traction, Electrical Stimulation, Gait Training, Manual Therapy, Moist Heat/ Ice, Neuromuscular Re-ed, Therapeutic Activities, Therapeutic Exercise, Ultrasound, and dry needling, IASTM, and kinesiotaping PRN.        Star Sandoval, PT

## 2024-07-31 ENCOUNTER — CLINICAL SUPPORT (OUTPATIENT)
Dept: REHABILITATION | Facility: HOSPITAL | Age: 69
End: 2024-07-31
Payer: MEDICARE

## 2024-07-31 DIAGNOSIS — M54.50 LUMBAR PAIN: ICD-10-CM

## 2024-07-31 DIAGNOSIS — M25.552 BILATERAL HIP PAIN: ICD-10-CM

## 2024-07-31 DIAGNOSIS — M25.551 BILATERAL HIP PAIN: ICD-10-CM

## 2024-07-31 DIAGNOSIS — M54.16 LUMBAR RADICULOPATHY: Primary | ICD-10-CM

## 2024-07-31 PROCEDURE — 97112 NEUROMUSCULAR REEDUCATION: CPT | Mod: CQ

## 2024-07-31 PROCEDURE — 97530 THERAPEUTIC ACTIVITIES: CPT | Mod: CQ

## 2024-07-31 NOTE — PROGRESS NOTES
[facility]   Physical Therapy Treatment Note      [facility]  Cl[facility] Number: [id number]    Therapy Diagnosis:   Encounter Diagnoses   Name Primary?    Lumbar radiculopathy Yes    Lumbar pain     Bilateral hip pain          Physician: Stephanie Anderson PA-C    Visit Date: 7/31/2024    Physician Orders: PT Eval and Treat   Medical Diagnosis from Referral:   M54.16 (ICD-10-CM) - Lumbar radiculopathy   Evaluation Date: 7/18/2024  Authorization Period Expiration: 12/31/2024  Plan of Care Expiration: 10/18/2024  Progress Note Due: 8/18/2024  Visit # / Visits authorized: 1/1, 3/10  FOTO: 1/ 3     Precautions: Standard      Time In: 9:05 am  Time Out: 9:55 am  Total Billable Time: 40 minutes    PTA Visit #: 1/5       Subjective     Patient reports: back is feeling good, mild L groin pain that gets worse as the day goes on.  She was compliant with home exercise program.  Response to previous treatment: improved symptoms  Functional change: none reported    Pain: 3/10  Location: bilateral back  and buttocks      Objective      Objective Measures updated at progress report unless specified.     Treatment     Diann received the treatments listed below:      therapeutic exercises to develop ROM and flexibility for 2 minutes including:  Seated hip adductor stretch 5x15s B  SLR B 2x10      Next session:       neuromuscular re-education activities to improve: Coordination, Proprioception, Posture, and Motor control for 23 minutes. The following activities were included:  TrA activations    - with SB x10   - obliques with SB  SL clamshell 1x1' B  SL hip abd 1x1' B  Supine hip abd iso 3x10, 3s  Bridges 3x10 - VC to monitor hamstring activation  Hip add ball squeeze 3x10, 3s      therapeutic activities to improve functional performance for 15  minutes, including:  Pallof green theraband 2x10 B  SAPD with TrA activations 2x10, 3s  Standing hip ext/abd 2# 2x10 B each  Seated fwd press 4#  2x10        Patient Education and Home Exercises       Education provided:   - HEP maintenance    Written Home Exercises Provided: Patient instructed to cont prior HEP. Exercises were reviewed and Diann was able to demonstrate them prior to the end of the session.  Diann demonstrated good  understanding of the education provided. See Electronic Medical Record under Patient Instructions for exercises provided during therapy sessions    Assessment     Continued with established program with focus on hip and core strength and stability. Mild pain noted with newly added adductor stretch but able to manage stretch in pain free range.    Diann Is progressing well towards her goals.   Patient prognosis is Good.   Patient will benefit from skilled outpatient Physical Therapy to address the deficits stated above and in the chart below, provide patient /family education, and to maximize patientt's level of independence.      Plan of care discussed with patient: Yes  Patient's spiritual, cultural and educational needs considered and patient is agreeable to the plan of care and goals as stated below:      Anticipated Barriers for therapy: chronicity, pain    Goals:   Short Term Goals (4 Weeks):  1. Pt will be compliant with HEP to supplement PT in decreasing pain with functional mobility.  2. Pt will perform pallof press with good control to demonstrate improved core strength.  3. Pt will improve lumbar ext ROM by 5 degrees to improve functional mobility.  4. Pt will improve impaired LE MMTs by 1/2 score to improve strength for functional tasks.  Long Term Goals (8 Weeks):   1. Pt will improve FOTO score to </= 60% limited to decrease perceived limitation with maintaining/changing body position.   2. Pt will perform floor to waist lift with good control to demonstrate improved core strength.  3. Pt will improve impaired LE MMTs by 1 score to improve strength for functional tasks.  4. Pt will report no pain during lumbar  ROM to promote functional mobility.     Plan     Plan of care Certification: 7/18/2024 to 10/18/2024.     Outpatient Physical Therapy 2 times weekly for 12 weeks to include the following interventions: Cervical/Lumbar Traction, Electrical Stimulation, Gait Training, Manual Therapy, Moist Heat/ Ice, Neuromuscular Re-ed, Therapeutic Activities, Therapeutic Exercise, Ultrasound, and dry needling, IASTM, and kinesiotaping PRN.        Moody Bhatt, PTA

## 2024-08-05 ENCOUNTER — CLINICAL SUPPORT (OUTPATIENT)
Dept: REHABILITATION | Facility: HOSPITAL | Age: 69
End: 2024-08-05
Payer: MEDICARE

## 2024-08-05 DIAGNOSIS — M54.50 LUMBAR PAIN: ICD-10-CM

## 2024-08-05 DIAGNOSIS — M25.552 BILATERAL HIP PAIN: ICD-10-CM

## 2024-08-05 DIAGNOSIS — M25.551 BILATERAL HIP PAIN: ICD-10-CM

## 2024-08-05 DIAGNOSIS — M54.16 LUMBAR RADICULOPATHY: Primary | ICD-10-CM

## 2024-08-05 PROCEDURE — 97530 THERAPEUTIC ACTIVITIES: CPT | Mod: CQ

## 2024-08-05 PROCEDURE — 97112 NEUROMUSCULAR REEDUCATION: CPT | Mod: CQ

## 2024-08-19 ENCOUNTER — CLINICAL SUPPORT (OUTPATIENT)
Dept: REHABILITATION | Facility: HOSPITAL | Age: 69
End: 2024-08-19
Payer: MEDICARE

## 2024-08-19 DIAGNOSIS — M54.16 LUMBAR RADICULOPATHY: Primary | ICD-10-CM

## 2024-08-19 DIAGNOSIS — M54.50 LUMBAR PAIN: ICD-10-CM

## 2024-08-19 DIAGNOSIS — M25.551 BILATERAL HIP PAIN: ICD-10-CM

## 2024-08-19 DIAGNOSIS — M25.552 BILATERAL HIP PAIN: ICD-10-CM

## 2024-08-19 PROCEDURE — 97530 THERAPEUTIC ACTIVITIES: CPT

## 2024-08-19 PROCEDURE — 97112 NEUROMUSCULAR REEDUCATION: CPT

## 2024-08-19 PROCEDURE — 97110 THERAPEUTIC EXERCISES: CPT

## 2024-08-19 NOTE — PROGRESS NOTES
[facility]   Physical Therapy Treatment Note      [facility]  [facility] Number: [id number]    Therapy Diagnosis:   Encounter Diagnoses   Name Primary?    Lumbar radiculopathy Yes    Lumbar pain     Bilateral hip pain            Physician: Stephanie Anderson PA-C    Visit Date: 2024    Physician Orders: PT Eval and Treat   Medical Diagnosis from Referral:   M54.16 (ICD-10-CM) - Lumbar radiculopathy   Evaluation Date: 2024  Authorization Period Expiration: 2024  Plan of Care Expiration: 10/18/2024  Progress Note Due: 2024  Visit # / Visits authorized: , 5/10  FOTO: 1/ 3     Precautions: Standard      Time In: 9:15 AM  Time Out: 9:54 AM  Total Billable Time: 39 minutes    PTA Visit #: 0       Subjective     Patient reports: she feels 90% better when on meds and 50% better when off. She fell down the stairs last monday and is feeling back to where she was before.   She was compliant with home exercise program.  Response to previous treatment: improved symptoms  Functional change: none reported    Pain: 10  Location: L buttocks      Objective      Objective Measures updated at progress report unless specified.          Lumbar AROM: Pain/Dysfunction with Movement: !=pain   Flexion: 80 degrees Hamstring stretch    Extension: 18 degrees Pulling quad   Right side bendin degrees    Left side bendin degrees stiff   Right rotation: 30% limited L hip pain   Left rotation: 15% limited          Right Left Comment: !=pain   Hip Flexion: 3+/5! 4+/5     Hip ABD: 4/5 4/5     Hip ADD: 4/5 4+/5     Hip Extension: 4/5 4/5    Hip IR: 4-/5 4+/5     Hip ER: 4-/5 4+/5           Right Left Comment ! = pain   Knee extension: 5/5 4+/5     Knee flexion: 5/5 5/5     Ankle DF: 5/5 5/5          Treatment     Diann received the treatments listed below:      therapeutic exercises to develop ROM and flexibility for 8 minutes including:  Seated hip adductor stretch 5x15s B  - NT  Long arc quads ecc (7s) x15 B 4#  SLR B 2x10      Next session:       neuromuscular re-education activities to improve: Coordination, Proprioception, Posture, and Motor control for 21 minutes. The following activities were included:  TrA activations    - with SB x10   - obliques with SB x10 each way  SL clamshell 1x1' B  SL hip abd 1x1' B  Supine hip abd iso 3x10, 3s - NT  Bridges 3x10 with heel emphasis  Hip add ball squeeze 3x10, 3s - NT      therapeutic activities to improve functional performance for 10  minutes, including:  Pallof green theraband 2x10 B  SAPD with TrA activations GTB 2x10, 3s - NT  Standing hip ext/abd 4# 2x10 B each  Sit to stand fwd press 4# x10 - NT        Patient Education and Home Exercises       Education provided:   - HEP maintenance    Written Home Exercises Provided: Patient instructed to cont prior HEP. Exercises were reviewed and Diann was able to demonstrate them prior to the end of the session.  Diann demonstrated good  understanding of the education provided. See Electronic Medical Record under Patient Instructions for exercises provided during therapy sessions    Assessment     Pt presents with significant improvements in subjective reports and objective measures as compared to initial evaluation as indicated above. She has inc'd R hip pain today which may be from recent fall though she was able to tolerate all activities. Added ecc long arc quads to address c/o L knee pain.       Diann Is progressing well towards her goals.   Patient prognosis is Good.   Patient will benefit from skilled outpatient Physical Therapy to address the deficits stated above and in the chart below, provide patient /family education, and to maximize patientt's level of independence.      Plan of care discussed with patient: Yes  Patient's spiritual, cultural and educational needs considered and patient is agreeable to the plan of care and goals as stated below:      Anticipated Barriers for  therapy: chronicity, pain    Goals:   Short Term Goals (4 Weeks):  1. Pt will be compliant with HEP to supplement PT in decreasing pain with functional mobility.  2. Pt will perform pallof press with good control to demonstrate improved core strength.  3. Pt will improve lumbar ext ROM by 5 degrees to improve functional mobility.  4. Pt will improve impaired LE MMTs by 1/2 score to improve strength for functional tasks.  Long Term Goals (8 Weeks):   1. Pt will improve FOTO score to </= 60% limited to decrease perceived limitation with maintaining/changing body position.   2. Pt will perform floor to waist lift with good control to demonstrate improved core strength.  3. Pt will improve impaired LE MMTs by 1 score to improve strength for functional tasks.  4. Pt will report no pain during lumbar ROM to promote functional mobility.     Plan     Plan of care Certification: 7/18/2024 to 10/18/2024.     Outpatient Physical Therapy 2 times weekly for 12 weeks to include the following interventions: Cervical/Lumbar Traction, Electrical Stimulation, Gait Training, Manual Therapy, Moist Heat/ Ice, Neuromuscular Re-ed, Therapeutic Activities, Therapeutic Exercise, Ultrasound, and dry needling, IASTM, and kinesiotaping PRN.        Star Sandoval, PT

## 2024-08-21 ENCOUNTER — CLINICAL SUPPORT (OUTPATIENT)
Dept: REHABILITATION | Facility: HOSPITAL | Age: 69
End: 2024-08-21
Payer: MEDICARE

## 2024-08-21 DIAGNOSIS — M25.552 BILATERAL HIP PAIN: ICD-10-CM

## 2024-08-21 DIAGNOSIS — M54.16 LUMBAR RADICULOPATHY: Primary | ICD-10-CM

## 2024-08-21 DIAGNOSIS — M25.551 BILATERAL HIP PAIN: ICD-10-CM

## 2024-08-21 DIAGNOSIS — M54.50 LUMBAR PAIN: ICD-10-CM

## 2024-08-21 NOTE — PROGRESS NOTES
[facility]   Physical Therapy Treatment Note      Name: Diann Billings  Clinic Number: [id number]    Therapy Diagnosis:   Encounter Diagnoses   Name Primary?    Lumbar radiculopathy Yes    Lumbar pain     Bilateral hip pain            Physician: Stephanie Anderson PA-C    Visit Date: 2024    Physician Orders: PT Eval and Treat   Medical Diagnosis from Referral:   M54.16 (ICD-10-CM) - Lumbar radiculopathy   Evaluation Date: 2024  Authorization Period Expiration: 2024  Plan of Care Expiration: 10/18/2024  Progress Note Due: 2024  Visit # / Visits authorized: , 6/10  FOTO: 1/ 3     Precautions: Standard      Time In: 9:15 am  Time Out: 955 am  Total Billable Time: 40 minutes    PTA Visit #:        Subjective     Patient reports: she is doing good. She is slowly doing more activities without any back pain.  She was compliant with home exercise program.  Response to previous treatment: improved symptoms  Functional change: greater tolerance to ADL's    Pain: 0/10  Location: L buttocks      Objective      Objective Measures updated at progress report unless specified.          Lumbar AROM: Pain/Dysfunction with Movement: !=pain   Flexion: 80 degrees Hamstring stretch    Extension: 18 degrees Pulling quad   Right side bendin degrees    Left side bendin degrees stiff   Right rotation: 30% limited L hip pain   Left rotation: 15% limited          Right Left Comment: !=pain   Hip Flexion: 3+/5! 4+/5     Hip ABD: 4/5 4/5     Hip ADD: 4/5 4+/5     Hip Extension: 4/5 4/5    Hip IR: 4-/5 4+/5     Hip ER: 4-/5 4+/5           Right Left Comment ! = pain   Knee extension: 5/5 4+/5     Knee flexion: 5/5 5/5     Ankle DF: 5/5 5/5          Treatment     Diann received the treatments listed below:      therapeutic exercises to develop ROM and flexibility for 8 minutes including:  Seated hip adductor stretch 5x15s B - NT  Long arc quads ecc (7s) x15 B 4#  SLR B  2x10      Next session:       neuromuscular re-education activities to improve: Coordination, Proprioception, Posture, and Motor control for 23 minutes. The following activities were included:  TrA activations    - with SB x10   - obliques with SB x10 each way  SL clamshell 1x1' B  SL hip abd 1x1' B  Supine hip abd iso 3x10, 3s - NT  Bridges 3x10 with heel emphasis  Hip add ball squeeze 3x10, 3s - NT      therapeutic activities to improve functional performance for 9 minutes, including:  Pallof green theraband 2x10 B  SAPD with TrA activations GTB 2x10, 3s - NT  SAPD c marches GTB 2x10  Standing hip ext/abd 4# 2x10 B each  Sit to stand fwd press 4# x10 - NT        Patient Education and Home Exercises       Education provided:   - HEP maintenance    Written Home Exercises Provided: Patient instructed to cont prior HEP. Exercises were reviewed and Diann was able to demonstrate them prior to the end of the session.  Diann demonstrated good  understanding of the education provided. See Electronic Medical Record under Patient Instructions for exercises provided during therapy sessions    Assessment     Pt presents with continued improvements per subjective reports. Patient exhibited muscle fatigue mid session with L hip tightness but no pain. She is able to tolerate more activities at home without any issues.      Diann Is progressing well towards her goals.   Patient prognosis is Good.   Patient will benefit from skilled outpatient Physical Therapy to address the deficits stated above and in the chart below, provide patient /family education, and to maximize patientt's level of independence.      Plan of care discussed with patient: Yes  Patient's spiritual, cultural and educational needs considered and patient is agreeable to the plan of care and goals as stated below:      Anticipated Barriers for therapy: chronicity, pain    Goals:   Short Term Goals (4 Weeks):  1. Pt will be compliant with HEP to supplement PT  in decreasing pain with functional mobility.  2. Pt will perform pallof press with good control to demonstrate improved core strength.  3. Pt will improve lumbar ext ROM by 5 degrees to improve functional mobility.  4. Pt will improve impaired LE MMTs by 1/2 score to improve strength for functional tasks.  Long Term Goals (8 Weeks):   1. Pt will improve FOTO score to </= 60% limited to decrease perceived limitation with maintaining/changing body position.   2. Pt will perform floor to waist lift with good control to demonstrate improved core strength.  3. Pt will improve impaired LE MMTs by 1 score to improve strength for functional tasks.  4. Pt will report no pain during lumbar ROM to promote functional mobility.     Plan     Plan of care Certification: 7/18/2024 to 10/18/2024.     Outpatient Physical Therapy 2 times weekly for 12 weeks to include the following interventions: Cervical/Lumbar Traction, Electrical Stimulation, Gait Training, Manual Therapy, Moist Heat/ Ice, Neuromuscular Re-ed, Therapeutic Activities, Therapeutic Exercise, Ultrasound, and dry needling, IASTM, and kinesiotaping PRN.        Moody Bhatt, PTA

## 2024-08-26 ENCOUNTER — CLINICAL SUPPORT (OUTPATIENT)
Dept: REHABILITATION | Facility: HOSPITAL | Age: 69
End: 2024-08-26
Payer: MEDICARE

## 2024-08-26 DIAGNOSIS — M54.16 LUMBAR RADICULOPATHY: Primary | ICD-10-CM

## 2024-08-26 DIAGNOSIS — M54.50 LUMBAR PAIN: ICD-10-CM

## 2024-08-26 DIAGNOSIS — M25.551 BILATERAL HIP PAIN: ICD-10-CM

## 2024-08-26 DIAGNOSIS — M25.552 BILATERAL HIP PAIN: ICD-10-CM

## 2024-08-26 PROCEDURE — 97112 NEUROMUSCULAR REEDUCATION: CPT | Mod: KX

## 2024-08-26 PROCEDURE — 97530 THERAPEUTIC ACTIVITIES: CPT | Mod: KX

## 2024-08-26 NOTE — PROGRESS NOTES
[facility]   Physical Therapy Treatment Note      [facility]  Cl[facility] Number: [id number]    Therapy Diagnosis:   Encounter Diagnoses   Name Primary?    Lumbar radiculopathy Yes    Lumbar pain     Bilateral hip pain          Physician: Stephanie Anderson PA-C    Visit Date: 8/26/2024    Physician Orders: PT Eval and Treat   Medical Diagnosis from Referral:   M54.16 (ICD-10-CM) - Lumbar radiculopathy   Evaluation Date: 7/18/2024  Authorization Period Expiration: 12/31/2024  Plan of Care Expiration: 10/18/2024  Progress Note Due: 8/18/2024  Visit # / Visits authorized: 1/1, 6/10  FOTO: 1/ 3     Precautions: Standard      Time In: 10:00am  Time Out: 10:31 am  Total Billable Time: 31 minutes    PTA Visit #: 0/5       Subjective     Patient reports: She is hurting a little today in the back of the hip and the front was hurting yesterday. She thinks this is because she hasn't taken meds or done exercises in 2 days.   She was compliant with home exercise program.  Response to previous treatment: improved symptoms  Functional change: greater tolerance to ADL's    Pain: 0/10  Location: L buttocks      Objective      Objective Measures updated at progress report unless specified.         Treatment     Diann received the treatments listed below:      therapeutic exercises to develop ROM and flexibility for 8 minutes including:  Seated hip adductor stretch 5x15s B - NT  Long arc quads ecc (7s) x15 B 4#  SLR B 2x10      neuromuscular re-education activities to improve: Coordination, Proprioception, Posture, and Motor control for 13 minutes. The following activities were included:  TrA activations    - with SB x10   - obliques with SB x10 each way  SL clamshell 1x1' B  SL hip abd 1x1' B  Supine hip abd iso 3x10, 3s  Bridges 3x10 with heel emphasis  Hip add ball squeeze 3x10, 3s - NT      therapeutic activities to improve functional performance for 10 minutes, including:  Pallof green  theraband 2x10 B  SAPD with TrA activations GTB 2x10, 3s   Standing hip ext/abd 4# 2x10 B each (only able to complete x10 on R LE d/t pain)  Sit to stand fwd press 4# x10 - NT        Patient Education and Home Exercises       Education provided:   - HEP maintenance    Written Home Exercises Provided: Patient instructed to cont prior HEP. Exercises were reviewed and Diann was able to demonstrate them prior to the end of the session.  Diann demonstrated good  understanding of the education provided. See Electronic Medical Record under Patient Instructions for exercises provided during therapy sessions    Assessment     Continued with established program with difficulty during standing hip abd/ext, so stopped exercise early to avoid increasing symptoms. Able to continue session with core activities with no adverse effects. Plan to re-assess next session.       Diann Is progressing well towards her goals.   Patient prognosis is Good.   Patient will benefit from skilled outpatient Physical Therapy to address the deficits stated above and in the chart below, provide patient /family education, and to maximize patientt's level of independence.      Plan of care discussed with patient: Yes  Patient's spiritual, cultural and educational needs considered and patient is agreeable to the plan of care and goals as stated below:      Anticipated Barriers for therapy: chronicity, pain    Goals:   Short Term Goals (4 Weeks):  1. Pt will be compliant with HEP to supplement PT in decreasing pain with functional mobility.  2. Pt will perform pallof press with good control to demonstrate improved core strength.  3. Pt will improve lumbar ext ROM by 5 degrees to improve functional mobility.  4. Pt will improve impaired LE MMTs by 1/2 score to improve strength for functional tasks.  Long Term Goals (8 Weeks):   1. Pt will improve FOTO score to </= 60% limited to decrease perceived limitation with maintaining/changing body position.    2. Pt will perform floor to waist lift with good control to demonstrate improved core strength.  3. Pt will improve impaired LE MMTs by 1 score to improve strength for functional tasks.  4. Pt will report no pain during lumbar ROM to promote functional mobility.     Plan     Plan of care Certification: 7/18/2024 to 10/18/2024.     Outpatient Physical Therapy 2 times weekly for 12 weeks to include the following interventions: Cervical/Lumbar Traction, Electrical Stimulation, Gait Training, Manual Therapy, Moist Heat/ Ice, Neuromuscular Re-ed, Therapeutic Activities, Therapeutic Exercise, Ultrasound, and dry needling, IASTM, and kinesiotaping PRN.        Star Sandoval, PT

## 2024-08-28 ENCOUNTER — CLINICAL SUPPORT (OUTPATIENT)
Dept: REHABILITATION | Facility: HOSPITAL | Age: 69
End: 2024-08-28
Payer: MEDICARE

## 2024-08-28 DIAGNOSIS — M25.552 BILATERAL HIP PAIN: ICD-10-CM

## 2024-08-28 DIAGNOSIS — M25.551 BILATERAL HIP PAIN: ICD-10-CM

## 2024-08-28 DIAGNOSIS — M54.50 LUMBAR PAIN: ICD-10-CM

## 2024-08-28 DIAGNOSIS — M54.16 LUMBAR RADICULOPATHY: Primary | ICD-10-CM

## 2024-08-28 PROCEDURE — 97112 NEUROMUSCULAR REEDUCATION: CPT

## 2024-08-28 PROCEDURE — 97530 THERAPEUTIC ACTIVITIES: CPT

## 2024-08-28 NOTE — PROGRESS NOTES
[facility]   Physical Therapy Treatment Note      [facility]  [facility] Number: [id number]    Therapy Diagnosis:   Encounter Diagnoses   Name Primary?    Lumbar radiculopathy Yes    Lumbar pain     Bilateral hip pain          Physician: Stephanie Anderson PA-C    Visit Date: 8/28/2024    Physician Orders: PT Eval and Treat   Medical Diagnosis from Referral:   M54.16 (ICD-10-CM) - Lumbar radiculopathy   Evaluation Date: 7/18/2024  Authorization Period Expiration: 12/31/2024  Plan of Care Expiration: 10/18/2024  Progress Note Due: 8/18/2024  Visit # / Visits authorized: 1/1, 7/10  FOTO: 1/ 3     Precautions: Standard      Time In: 10:00am  Time Out: 10:35 am  Total Billable Time: 35 minutes    PTA Visit #: 0/5       Subjective     Patient reports: Her hip is 75% better since starting PT. She twisted her knee yesterday and has been having some pain since then. This happens every now and then and feels like has become more frequent lately.   She was compliant with home exercise program.  Response to previous treatment: improved symptoms  Functional change: greater tolerance to ADL's    Pain: 0/10  Location: L buttocks      Objective      Objective Measures updated at progress report unless specified.     Sweep test:  mild positive    Valgus/Varus Stress Test (0 and 30 degree): negative for laxity; pain with varus stress at 30*    Anterior Drawer: negative     Joint mobility: patellofemoral mobility WFL B    Jasiel's: lateral positive    Palpation: no TTP       Treatment     Diann received the treatments listed below:      therapeutic exercises to develop ROM and flexibility for 8 minutes including:  Objective measures above  SLR B 2x10 (with and without boxes)      neuromuscular re-education activities to improve: Coordination, Proprioception, Posture, and Motor control for 19 minutes. The following activities were included:  TrA activations    - with SB x10   - obliques with  SB x10 each way  SL clamshell 1x1' B  SL hip abd 1x1' B  Supine hip abd iso 3x10, 3s        therapeutic activities to improve functional performance for 8 minutes, including:  Pallof green theraband 2x10 B  SAPD with TrA activations GTB 2x10, 3s   Standing hip ext/abd 4# 2x10 B each (NT)  Sit to stand fwd press 4# x10 - NT        Patient Education and Home Exercises       Education provided:   - HEP maintenance    Written Home Exercises Provided: Patient instructed to cont prior HEP. Exercises were reviewed and Diann was able to demonstrate them prior to the end of the session.  Diann demonstrated good  understanding of the education provided. See Electronic Medical Record under Patient Instructions for exercises provided during therapy sessions    Assessment     Pt with inc'd knee pain today and cannot rule out meniscal involvement at this time. Provided pt with HEP focusing on quad strengthening/stability with good response following these activities in clinic today. Pt will follow-up as needed.       Diann Is progressing well towards her goals.   Patient prognosis is Good.   Patient will benefit from skilled outpatient Physical Therapy to address the deficits stated above and in the chart below, provide patient /family education, and to maximize patientt's level of independence.      Plan of care discussed with patient: Yes  Patient's spiritual, cultural and educational needs considered and patient is agreeable to the plan of care and goals as stated below:      Anticipated Barriers for therapy: chronicity, pain    Goals:   Short Term Goals (4 Weeks):  1. Pt will be compliant with HEP to supplement PT in decreasing pain with functional mobility.  2. Pt will perform pallof press with good control to demonstrate improved core strength.  3. Pt will improve lumbar ext ROM by 5 degrees to improve functional mobility.  4. Pt will improve impaired LE MMTs by 1/2 score to improve strength for functional  tasks.  Long Term Goals (8 Weeks):   1. Pt will improve FOTO score to </= 60% limited to decrease perceived limitation with maintaining/changing body position.   2. Pt will perform floor to waist lift with good control to demonstrate improved core strength.  3. Pt will improve impaired LE MMTs by 1 score to improve strength for functional tasks.  4. Pt will report no pain during lumbar ROM to promote functional mobility.     Plan     Plan of care Certification: 7/18/2024 to 10/18/2024.     Outpatient Physical Therapy 2 times weekly for 12 weeks to include the following interventions: Cervical/Lumbar Traction, Electrical Stimulation, Gait Training, Manual Therapy, Moist Heat/ Ice, Neuromuscular Re-ed, Therapeutic Activities, Therapeutic Exercise, Ultrasound, and dry needling, IASTM, and kinesiotaping PRN.        Star Sandoval, PT

## 2024-09-01 DIAGNOSIS — M54.16 LUMBAR RADICULOPATHY: ICD-10-CM

## 2024-09-03 RX ORDER — MELOXICAM 7.5 MG/1
7.5 TABLET ORAL
Qty: 30 TABLET | Refills: 5 | Status: SHIPPED | OUTPATIENT
Start: 2024-09-03

## 2024-09-03 NOTE — TELEPHONE ENCOUNTER
Refill Routing Note   Medication(s) are not appropriate for processing by [facility] for the following reason(s):        Outside of protocol    [facility] action(s):  Route               Appointments  past 12m or future 3m with PCP    Date Provider   Last Visit   11/6/2023 Virginia Greco MD   Next Visit   Visit date not found Virginia Greco MD   ED visits in past 90 days: 0        Note composed:10:13 AM 09/03/2024

## 2024-10-08 ENCOUNTER — OFFICE VISIT (OUTPATIENT)
Dept: DERMATOLOGY | Facility: CLINIC | Age: 69
End: 2024-10-08
Payer: MEDICARE

## 2024-10-08 DIAGNOSIS — L57.0 AK (ACTINIC KERATOSIS): Primary | ICD-10-CM

## 2024-10-08 DIAGNOSIS — L81.4 LENTIGO: ICD-10-CM

## 2024-10-08 DIAGNOSIS — L82.1 SK (SEBORRHEIC KERATOSIS): ICD-10-CM

## 2024-10-08 DIAGNOSIS — D22.9 MULTIPLE BENIGN NEVI: ICD-10-CM

## 2024-10-08 DIAGNOSIS — Z12.83 SCREENING EXAM FOR SKIN CANCER: ICD-10-CM

## 2024-10-08 PROCEDURE — 1160F RVW MEDS BY RX/DR IN RCRD: CPT | Mod: CPTII,S$GLB,, | Performed by: DERMATOLOGY

## 2024-10-08 PROCEDURE — 1159F MED LIST DOCD IN RCRD: CPT | Mod: CPTII,S$GLB,, | Performed by: DERMATOLOGY

## 2024-10-08 PROCEDURE — 1101F PT FALLS ASSESS-DOCD LE1/YR: CPT | Mod: CPTII,S$GLB,, | Performed by: DERMATOLOGY

## 2024-10-08 PROCEDURE — 99999 PR PBB SHADOW E&M-EST. PATIENT-LVL III: CPT | Mod: PBBFAC,,, | Performed by: DERMATOLOGY

## 2024-10-08 PROCEDURE — 3288F FALL RISK ASSESSMENT DOCD: CPT | Mod: CPTII,S$GLB,, | Performed by: DERMATOLOGY

## 2024-10-08 PROCEDURE — 17003 DESTRUCT PREMALG LES 2-14: CPT | Mod: S$GLB,,, | Performed by: DERMATOLOGY

## 2024-10-08 PROCEDURE — 17000 DESTRUCT PREMALG LESION: CPT | Mod: S$GLB,,, | Performed by: DERMATOLOGY

## 2024-10-08 PROCEDURE — 1126F AMNT PAIN NOTED NONE PRSNT: CPT | Mod: CPTII,S$GLB,, | Performed by: DERMATOLOGY

## 2024-10-08 PROCEDURE — 99213 OFFICE O/P EST LOW 20 MIN: CPT | Mod: 25,S$GLB,, | Performed by: DERMATOLOGY

## 2024-10-08 NOTE — PROGRESS NOTES
Subjective:      Patient ID:  Diann Wasserman is a 69 y.o. female who presents for   Chief Complaint   Patient presents with    Skin Check     Ubse      History of Present Illness: The patient presents for follow up of skin check.    The patient was last seen on: 5/30/23 for cryosurgery to actinic keratoses which have resolved.    No h/o of mm or nmsc    Other skin complaints: no new concerns          Review of Systems   Skin:  Positive for activity-related sunscreen use and wears hat (activities). Negative for daily sunscreen use and recent sunburn.   Hematologic/Lymphatic: Does not bruise/bleed easily.       Objective:   Physical Exam   Constitutional: She appears well-developed and well-nourished. No distress.   Neurological: She is alert and oriented to person, place, and time. She is not disoriented.   Psychiatric: She has a normal mood and affect.   Skin:   Areas Examined (abnormalities noted in diagram):   Head / Face Inspection Performed  Neck Inspection Performed  Chest / Axilla Inspection Performed  Back Inspection Performed  RUE Inspected  LUE Inspection Performed                 Diagram Legend     Erythematous scaling macule/papule c/w actinic keratosis       Vascular papule c/w angioma      Pigmented verrucoid papule/plaque c/w seborrheic keratosis      Yellow umbilicated papule c/w sebaceous hyperplasia      Irregularly shaped tan macule c/w lentigo     1-2 mm smooth white papules consistent with Milia      Movable subcutaneous cyst with punctum c/w epidermal inclusion cyst      Subcutaneous movable cyst c/w pilar cyst      Firm pink to brown papule c/w dermatofibroma      Pedunculated fleshy papule(s) c/w skin tag(s)      Evenly pigmented macule c/w junctional nevus     Mildly variegated pigmented, slightly irregular-bordered macule c/w mildly atypical nevus      Flesh colored to evenly pigmented papule c/w intradermal nevus       Pink pearly papule/plaque c/w basal cell carcinoma       Erythematous hyperkeratotic cursted plaque c/w SCC      Surgical scar with no sign of skin cancer recurrence      Open and closed comedones      Inflammatory papules and pustules      Verrucoid papule consistent consistent with wart     Erythematous eczematous patches and plaques     Dystrophic onycholytic nail with subungual debris c/w onychomycosis     Umbilicated papule    Erythematous-base heme-crusted tan verrucoid plaque consistent with inflamed seborrheic keratosis     Erythematous Silvery Scaling Plaque c/w Psoriasis     See annotation      Assessment / Plan:        AK(actinic keratosis)   Cryosurgery Procedure Note    Verbal consent from the patient is obtained including, but not limited to, risk of hypopigmentation/hyperpigmentation, scar, recurrence of lesion. The patient is aware of the precancerous quality and need for treatment of these lesions. Liquid nitrogen cryosurgery is applied to the 5 actinic keratoses, as detailed in the physical exam, to produce a freeze injury. The patient is aware that blisters may form and is instructed on wound care with gentle cleansing and use of vaseline ointment to keep moist until healed. The patient is supplied a handout on cryosurgery and is instructed to call if lesions do not completely resolve.     Multiple benign nevi    - minor problem and chronic.   Reassurance given to patient. No treatment necessary.        Lentigo  This is a benign hyperpigmented sun induced lesion. Recommend daily sun protection/avoidance and use of at least SPF 30, broad spectrum sunscreen (OTC drug) will reduce the number of new lesions. Treatment of these benign lesions are considered cosmetic.       SK (seborrheic keratosis)   These are benign inherited growths without a malignant potential.   Reassurance given to patient. No treatment is necessary.         Screening exam for skin cancer  Area(s) of previous NMSC evaluated with no signs of recurrence.  Upper body skin examination  performed today including at least 6 points as noted in physical examination. No lesions suspicious for malignancy noted.    Recommend daily sun protection/avoidance and use of at least SPF 30, broad spectrum sunscreen (OTC drug).      Follow up in about 1 year (around 10/8/2025), or if symptoms worsen or fail to improve.

## 2024-10-08 NOTE — PATIENT INSTRUCTIONS

## 2024-10-14 ENCOUNTER — HOSPITAL ENCOUNTER (OUTPATIENT)
Dept: RADIOLOGY | Facility: HOSPITAL | Age: 69
Discharge: HOME | End: 2024-10-14
Attending: PHYSICIAN ASSISTANT
Payer: MEDICARE

## 2024-10-14 ENCOUNTER — OFFICE VISIT (OUTPATIENT)
Dept: INTERNAL MEDICINE | Facility: CLINIC | Age: 69
End: 2024-10-14
Payer: MEDICARE

## 2024-10-14 VITALS
HEART RATE: 67 BPM | SYSTOLIC BLOOD PRESSURE: 118 MMHG | RESPIRATION RATE: 18 BRPM | DIASTOLIC BLOOD PRESSURE: 78 MMHG | TEMPERATURE: 98 F | WEIGHT: 137.38 LBS | BODY MASS INDEX: 25.28 KG/M2 | HEIGHT: 62 IN | OXYGEN SATURATION: 96 %

## 2024-10-14 DIAGNOSIS — M25.562 LEFT KNEE PAIN, UNSPECIFIED CHRONICITY: ICD-10-CM

## 2024-10-14 DIAGNOSIS — M25.562 LEFT KNEE PAIN, UNSPECIFIED CHRONICITY: Primary | ICD-10-CM

## 2024-10-14 PROCEDURE — 3078F DIAST BP <80 MM HG: CPT | Mod: CPTII,S$GLB,, | Performed by: PHYSICIAN ASSISTANT

## 2024-10-14 PROCEDURE — 99999 PR PBB SHADOW E&M-EST. PATIENT-LVL V: CPT | Mod: PBBFAC,,, | Performed by: PHYSICIAN ASSISTANT

## 2024-10-14 PROCEDURE — 73560 X-RAY EXAM OF KNEE 1 OR 2: CPT | Mod: TC,RT

## 2024-10-14 PROCEDURE — 73562 X-RAY EXAM OF KNEE 3: CPT | Mod: TC,LT

## 2024-10-14 PROCEDURE — 73560 X-RAY EXAM OF KNEE 1 OR 2: CPT | Mod: 26,59,RT, | Performed by: RADIOLOGY

## 2024-10-14 PROCEDURE — 1160F RVW MEDS BY RX/DR IN RCRD: CPT | Mod: CPTII,S$GLB,, | Performed by: PHYSICIAN ASSISTANT

## 2024-10-14 PROCEDURE — 73562 X-RAY EXAM OF KNEE 3: CPT | Mod: 26,LT,, | Performed by: RADIOLOGY

## 2024-10-14 PROCEDURE — 1159F MED LIST DOCD IN RCRD: CPT | Mod: CPTII,S$GLB,, | Performed by: PHYSICIAN ASSISTANT

## 2024-10-14 PROCEDURE — 3074F SYST BP LT 130 MM HG: CPT | Mod: CPTII,S$GLB,, | Performed by: PHYSICIAN ASSISTANT

## 2024-10-14 PROCEDURE — 3008F BODY MASS INDEX DOCD: CPT | Mod: CPTII,S$GLB,, | Performed by: PHYSICIAN ASSISTANT

## 2024-10-14 PROCEDURE — 99213 OFFICE O/P EST LOW 20 MIN: CPT | Mod: S$GLB,,, | Performed by: PHYSICIAN ASSISTANT

## 2024-10-14 PROCEDURE — 1125F AMNT PAIN NOTED PAIN PRSNT: CPT | Mod: CPTII,S$GLB,, | Performed by: PHYSICIAN ASSISTANT

## 2024-10-14 NOTE — PROGRESS NOTES
"Subjective:       Patient ID: Diann Wasserman is a 69 y.o. female.        Chief Complaint: Knee Pain    Diann Wasserman is an established patient of Virginia Greco MD here today for urgent care visit.    Lumbar radicular pain improved.  This is a new pain due to a fall.      Mid August going down the stairs, slipped and twisted left knee, fell onto back and buttocks and went down the stairs, slid down, legs "pretzeled" under her, did not hit head  Fell down four stairs  Pain in knee started immediately afterwards  Pain in the knee has progressively worsened over the past 2 months  Knee swells at times  No redness or warmth    Lipids -   Lipitor    Osteopenia    BMI 25           Review of Systems   Constitutional:  Negative for chills, diaphoresis, fatigue and fever.   HENT:  Negative for congestion and sore throat.    Eyes:  Negative for visual disturbance.   Respiratory:  Negative for cough, chest tightness and shortness of breath.    Cardiovascular:  Negative for chest pain, palpitations and leg swelling.   Gastrointestinal:  Negative for abdominal pain, blood in stool, constipation, diarrhea, nausea and vomiting.   Genitourinary:  Negative for dysuria, frequency, hematuria and urgency.   Musculoskeletal:  Positive for arthralgias and joint swelling. Negative for back pain.   Skin:  Negative for rash.   Neurological:  Negative for dizziness, syncope, weakness and headaches.   Psychiatric/Behavioral:  Negative for dysphoric mood and sleep disturbance. The patient is not nervous/anxious.        Objective:      Physical Exam  Constitutional:       General: She is not in acute distress.     Appearance: She is well-developed. She is not diaphoretic.   HENT:      Head: Normocephalic and atraumatic.      Left Ear: External ear normal.   Pulmonary:      Effort: Pulmonary effort is normal.   Abdominal:      Palpations: Abdomen is soft.   Musculoskeletal:      Cervical back: Neck supple.      Left knee: " "No swelling. Decreased range of motion. Tenderness present over the medial joint line and lateral joint line.   Skin:     General: Skin is warm and dry.   Neurological:      Mental Status: She is alert.         Assessment:       1. Left knee pain, unspecified chronicity        Plan:       Diann was seen today for knee pain.    Diagnoses and all orders for this visit:    Left knee pain, unspecified chronicity  -     X-ray Knee Ortho Left; Future  -     Ambulatory referral/consult to Orthopedics; Future    Check x-ray and refer to ortho    Pt has been given instructions populated from patient instructions database and has verbalized understanding of the after visit summary and information contained wherein.    Follow up with a primary care provider. May go to ER for acute shortness of breath, lightheadedness, fever, or any other emergent complaints or changes in condition.    "This note will be shared with the patient"    Future Appointments   Date Time Provider Department Center   10/15/2024 10:00 AM Ramiro Ash NP [facility]                 "

## 2024-10-15 ENCOUNTER — OFFICE VISIT (OUTPATIENT)
Dept: ORTHOPEDICS | Facility: CLINIC | Age: 69
End: 2024-10-15
Payer: MEDICARE

## 2024-10-15 ENCOUNTER — HOSPITAL ENCOUNTER (OUTPATIENT)
Dept: RADIOLOGY | Facility: HOSPITAL | Age: 69
Discharge: HOME | End: 2024-10-15
Attending: NURSE PRACTITIONER
Payer: MEDICARE

## 2024-10-15 DIAGNOSIS — M25.562 LEFT KNEE PAIN, UNSPECIFIED CHRONICITY: ICD-10-CM

## 2024-10-15 DIAGNOSIS — M16.12 PRIMARY OSTEOARTHRITIS OF LEFT HIP: Primary | ICD-10-CM

## 2024-10-15 DIAGNOSIS — M25.552 LEFT HIP PAIN: ICD-10-CM

## 2024-10-15 DIAGNOSIS — M25.552 LEFT HIP PAIN: Primary | ICD-10-CM

## 2024-10-15 PROCEDURE — 3288F FALL RISK ASSESSMENT DOCD: CPT | Mod: CPTII,S$GLB,, | Performed by: NURSE PRACTITIONER

## 2024-10-15 PROCEDURE — 1101F PT FALLS ASSESS-DOCD LE1/YR: CPT | Mod: CPTII,S$GLB,, | Performed by: NURSE PRACTITIONER

## 2024-10-15 PROCEDURE — 73502 X-RAY EXAM HIP UNI 2-3 VIEWS: CPT | Mod: 26,LT,, | Performed by: RADIOLOGY

## 2024-10-15 PROCEDURE — 99999 PR PBB SHADOW E&M-EST. PATIENT-LVL III: CPT | Mod: PBBFAC,,, | Performed by: NURSE PRACTITIONER

## 2024-10-15 PROCEDURE — 99204 OFFICE O/P NEW MOD 45 MIN: CPT | Mod: S$GLB,,, | Performed by: NURSE PRACTITIONER

## 2024-10-15 PROCEDURE — 1159F MED LIST DOCD IN RCRD: CPT | Mod: CPTII,S$GLB,, | Performed by: NURSE PRACTITIONER

## 2024-10-15 PROCEDURE — 1160F RVW MEDS BY RX/DR IN RCRD: CPT | Mod: CPTII,S$GLB,, | Performed by: NURSE PRACTITIONER

## 2024-10-15 PROCEDURE — 73502 X-RAY EXAM HIP UNI 2-3 VIEWS: CPT | Mod: TC,LT

## 2024-10-15 NOTE — PROGRESS NOTES
SUBJECTIVE:     Chief Complaint & History of Present Illness:  Diann Wasserman is a New 69 y.o. year old female patient here with a history of constant left knee pain which started several months ago.  There is a history of trauma.  She reports she fell in August but did not seek medical care.  The pain is located in the medial aspect of the knee and she is also having pain in her left groin.  The pain is described as achy, 8/10.  There is radiation.  There is catching or locking.  Aggravating factors include going up and down stairs and walking.  Associated symptoms include knee giving out.  There is not numbness or tingling of the lower extremity.  There is back pain. Previous treatments include Tylenol and Mobic which have provided minimal relief.  There is not a history of previous injury or surgery to the knee.  The patient does not use an assistive device.  She reports she had a right BLAYNE in Nov 2014 with Dr. Fairchild.      Review of patient's allergies indicates:   Allergen Reactions    Erythromycin Rash    Penicillins Rash and Other (See Comments)     Redness         Current Outpatient Medications   Medication Sig Dispense Refill    atorvastatin (LIPITOR) 20 MG tablet Take 1 tablet (20 mg total) by mouth once daily. 90 tablet 3    gabapentin (NEURONTIN) 100 MG capsule 1-3 tablets nightly as needed (Patient not taking: Reported on 10/14/2024) 90 capsule 1    meloxicam (MOBIC) 7.5 MG tablet Take 1 tablet by mouth once daily 30 tablet 5    multivit-min/iron/folic/lxx336 (HAIR, SKIN AND NAILS ADVANCED ORAL) Take by mouth Daily.      multivitamin (THERAGRAN) per tablet Take 1 tablet by mouth once daily.      TURMERIC ORAL Take by mouth Daily.       No current facility-administered medications for this visit.       Past Medical History:   Diagnosis Date    Arthritis     Cataract     Iris nevus, left     Right hip pain        Past Surgical History:   Procedure Laterality Date    BREAST SURGERY Bilateral      "breast lift    CHOLECYSTECTOMY  2012    COLONOSCOPY N/A 04/06/2016    Procedure: COLONOSCOPY;  Surgeon: Luis Bogran-Reyes, MD;  Location: [facility];  Service: Endoscopy;  Laterality: N/A;    COSMETIC SURGERY  1985    bilateral breast lift    HIP SURGERY  11/2014    right total    JOINT REPLACEMENT      TOTAL REDUCTION MAMMOPLASTY      TUBAL LIGATION         Family History   Problem Relation Name Age of Onset    Hypothyroidism Mother      Hypertension Father buzz     Aortic aneurysm Father buzz     Stroke Father buzz     No Known Problems Sister      No Known Problems Brother      Breast cancer Maternal Aunt  40    Breast cancer Cousin Annmarie          Review of Systems:  ROS:  Constitutional: no fever or chills  Eyes: no visual changes  ENT: no nasal congestion or sore throat  Respiratory: no cough or shortness of breath  Cardiovascular: no chest pain or palpitations  Gastrointestinal: no nausea or vomiting, tolerating diet  Genitourinary: no hematuria or dysuria  Integument/Breast: no rash or pruritis  Hematologic/Lymphatic: no easy bruising or lymphadenopathy  Musculoskeletal: no arthralgias or myalgias  Neurological: no seizures or tremors  Behavioral/Psych: no auditory or visual hallucinations  Endocrine: no heat or cold intolerance      OBJECTIVE:     PHYSICAL EXAM:  Vital Signs (Most Recent)  There were no vitals filed for this visit.     ,   Estimated body mass index is 25.12 kg/m² as calculated from the following:    Height as of 10/14/24: 5' 2" (1.575 m).    Weight as of 10/14/24: 62.3 kg (137 lb 5.6 oz).   General Appearance: Well nourished, well developed, in no acute distress.  HENT: Normal cephalic, oropharynx pink and moist  Eyes: PERRLA bilaterally and EOM x 4  Respiratory: Even and unlabored  Skin: Warm and Dry.   Psychiatric: AAO x 4, Mood & affect are normal.    left  Knee Exam:  Knee Range of Motion:normal   Effusion:none  Condition of skin:intact  Location of tenderness:Medial joint line "   Strength:4 of 5 quadriceps strength, 4 of 5 gastroc-soleus strength, and 4 of 5 hamstring strength  Stability:  stable to testing, Lachman: stable, LCL: stable, MCL: stable, and PCL: stable  Varus /Valgus stress:  normal  Jasiel:   negative    Hip Examination:  positives: Axial loading and log roll, +FADIR and +KWAKU and negatives: pulses full    RADIOGRAPHS:  Left knee x-ray dated October 14, 2024 shows the following:  Mild DJD. Mild narrowing of the medial tibiofemoral joint spaces. No fracture or dislocation. No bone destruction identified.    Left hip xray was obtained, findings show left hip shows no fracture.  She has DJD.  All radiographs were personally reviewed by me.    ASSESSMENT/PLAN:       ICD-10-CM ICD-9-CM   1. Primary osteoarthritis of left hip  M16.12 715.15   2. Left knee pain, unspecified chronicity  M25.562 719.46       -Diann Wasserman presents to clinic today with c/c left hip and knee pain for the past several months.  -X-ray as above.    I discussed xray findings with patient.  Appears she has DJD of the left hip that may be causing her left knee pain.  I discussed case with Dr. Ritchie, advised him she wants to see if she can avoid surgery.  He is recommending sending her to PT for the healthy joint program and have her follow up with him in 8 weeks.    Advised patient of recommendations.  Told her to continue using Tylenol and Mobic PRN.

## 2024-10-17 ENCOUNTER — CLINICAL SUPPORT (OUTPATIENT)
Dept: REHABILITATION | Facility: HOSPITAL | Age: 69
End: 2024-10-17
Attending: NURSE PRACTITIONER
Payer: MEDICARE

## 2024-10-17 DIAGNOSIS — M25.562 LEFT KNEE PAIN, UNSPECIFIED CHRONICITY: ICD-10-CM

## 2024-10-17 DIAGNOSIS — M16.12 PRIMARY OSTEOARTHRITIS OF LEFT HIP: ICD-10-CM

## 2024-10-17 PROCEDURE — 97110 THERAPEUTIC EXERCISES: CPT | Performed by: PHYSICAL THERAPIST

## 2024-10-17 NOTE — PLAN OF CARE
[facility]  Physical Therapy Plan of Care Note     Name: Diann Wasserman  Clinic Number: [id number]    Therapy Diagnosis:   Encounter Diagnoses   Name Primary?    Left knee pain, unspecified chronicity     Primary osteoarthritis of left hip      Physician: Ramiro Ash NP    Visit Date: 10/17/2024    Physician Orders: Eval and Treat   Medical Diagnosis from Referral:   M25.562 (ICD-10-CM) - Left knee pain, unspecified chronicity   M16.12 (ICD-10-CM) - Primary osteoarthritis of left hip     Evaluation Date: 7/8/24  Authorization Period Expiration: 12/31/24   Plan of Care Expiration: 11/17/24  Progress Note Due: 3rd visit   Visit # / Visits authorized: 9/ 10  FOTO: 1/1    Precautions: Standard  Functional Level Prior to Evaluation:  (I)    SUBJECTIVE     Update: she is still doing her exercises from previous PT. Her L hip and groin has been hurting for at least 1 year. She was treated in PT for sciatica on L side in August with relief of those symptoms, but no relief of the L hip pain. She has been compliant with this HEP daily. Her L knee has been hurting since her PT episode in August, when she slipped on the stairs and twisted her her knee. She was told by Orthopedist that the knee pain was instead being caused by L hip OA. She was told she will need surgery on her hip, but is trying to that off as long as possible. Walking >1 block has become increasingly difficult and she sometimes feels like the hip throws off her balance and pulls on the knee. She still navigates stairs 1 step at a time.     OBJECTIVE     5/10 L hip pain, 3/10 L knee pain along med/lateral patellar border     Update:      Right Left Comment: !=pain   Hip Flexion: 4-/5 4/5 !  L PSIS pain    Hip ABD: 4/5 4-/5 !  L PSIS pain    Hip ADD: 4/5 4/5     Hip Extension: 4-/5 4-/5 Dec'd glute activation   Hip IR: 4/5 4-/5 !     Hip ER: 4/5 4-/5 !           Right Left Comment ! = pain   Knee extension: 4+/5 4/5      Knee flexion: 4+/5 4+/5     Ankle DF: 4+/5 4+/5          Prone hip ext motor control: dec'd glute activation     Joint mobility: decreased grossly L hip      KWAKU: positive L     FADIR: positive L     90/90 Hamstring test:  WNL    ASSESSMENT     Update: Pt demonstrates minimal change in strength or function since last PT visit. L hip pain increasing with walking per pt report. She is no longer having sciatic pain. Pt is (I) and compliant with previous HEP daily and reports she feels worse when she doesn't do it. Pt was encouraged to use gym for cardio equipment: Nustep/recumbent bike for joint range of motion and BLE endurance. PT progressed HEP program and advised pt to continue at home, return to PT if knee pain worsens or having difficulty with HEP. Pt was instructed on exercise modification and advised to decrease reps or discontinue exercises that exacerbate her pain.     Previous Short Term Goals Status:   modified: Short Term Goals (4 Weeks):  1. Pt will be compliant with HEP to supplement PT in decreasing pain with functional mobility.  2. Pt will perform pallof press with good control to demonstrate improved core strength.  MET  3. Pt will improve lumbar ext ROM by 5 degrees to improve functional mobility. D/C  4. Pt will improve impaired LE MMTs by 1/2 score to improve strength for functional tasks.   New Short Term Goals Status:   Short Term Goals (4 Weeks):  1. Pt will be compliant with HEP to supplement PT in decreasing pain with functional mobility. MET  2. Pt will perform pallof press with good control to demonstrate improved core strength.  MET  3. Pt will improve impaired LE MMTs by 1/2 score to improve strength for functional tasks. (Progressing)  Long Term Goal Status: per start of care   Reasons for Recertification of Therapy:   new diagnosis     GOALS  Short Term Goals (4 Weeks):  1. Pt will be compliant with HEP to supplement PT in decreasing pain with functional mobility. MET  2. Pt will  perform pallof press with good control to demonstrate improved core strength.  MET  3. Pt will improve lumbar ext ROM by 5 degrees to improve functional mobility. D/C  4. Pt will improve impaired LE MMTs by 1/2 score to improve strength for functional tasks. (Progressing)  Long Term Goals (8 Weeks):   1. Pt will improve FOTO score to </= 60% limited to decrease perceived limitation with maintaining/changing body position. (Progressing)  2. Pt will perform floor to waist lift with good control to demonstrate improved core strength. MET  3. Pt will improve impaired LE MMTs by 1 score to improve strength for functional tasks. (Progressing)  4. Pt will report no pain during lumbar ROM to promote functional mobility. MET     PLAN     Updated Certification Period: 10/17/24 to 11/17/24   Recommended Treatment Plan: 1-2 times per week for 4 weeks:  Manual Therapy, Moist Heat/ Ice, Neuromuscular Re-ed, Patient Education, Self Care, Therapeutic Activities, and Therapeutic Exercise  Other Recommendations: none    Deana Kumar, PT

## 2024-12-16 DIAGNOSIS — M25.552 LEFT HIP PAIN: Primary | ICD-10-CM

## 2024-12-18 ENCOUNTER — HOSPITAL ENCOUNTER (OUTPATIENT)
Dept: RADIOLOGY | Facility: HOSPITAL | Age: 69
Discharge: HOME | End: 2024-12-18
Attending: ORTHOPAEDIC SURGERY
Payer: MEDICARE

## 2024-12-18 ENCOUNTER — OFFICE VISIT (OUTPATIENT)
Dept: ORTHOPEDICS | Facility: CLINIC | Age: 69
End: 2024-12-18
Payer: MEDICARE

## 2024-12-18 DIAGNOSIS — M25.552 LEFT HIP PAIN: ICD-10-CM

## 2024-12-18 DIAGNOSIS — M16.12 PRIMARY OSTEOARTHRITIS OF LEFT HIP: Primary | ICD-10-CM

## 2024-12-18 PROCEDURE — 73502 X-RAY EXAM HIP UNI 2-3 VIEWS: CPT | Mod: 26,LT,, | Performed by: RADIOLOGY

## 2024-12-18 PROCEDURE — 73502 X-RAY EXAM HIP UNI 2-3 VIEWS: CPT | Mod: TC,LT

## 2024-12-18 PROCEDURE — 99214 OFFICE O/P EST MOD 30 MIN: CPT | Mod: S$GLB,,, | Performed by: ORTHOPAEDIC SURGERY

## 2024-12-18 NOTE — PROGRESS NOTES
HPI: 69-year-old female complains of pain in the left hip.  The pain has been going on for quite some time.  She has a history of right total hip replacement in 2014 for osteoarthritis.  She has now been going to physical therapy with some improvement in her pain.  The pain is largely in the groin, is exacerbated by activity and relieved by rest.  She does also complain of occasional pain in the posterior aspect of the hip.  She states that her physical therapist worked on some exercises with her and she has now developed a home exercise program.    Past Medical History:   Diagnosis Date    Arthritis     Cataract     Iris nevus, left     Right hip pain        Current Outpatient Medications on File Prior to Visit   Medication Sig Dispense Refill    atorvastatin (LIPITOR) 20 MG tablet Take 1 tablet (20 mg total) by mouth once daily. 90 tablet 3    gabapentin (NEURONTIN) 100 MG capsule 1-3 tablets nightly as needed (Patient not taking: Reported on 10/15/2024) 90 capsule 1    meloxicam (MOBIC) 7.5 MG tablet Take 1 tablet by mouth once daily 30 tablet 5    multivit-min/iron/folic/reb473 (HAIR, SKIN AND NAILS ADVANCED ORAL) Take by mouth Daily.      multivitamin (THERAGRAN) per tablet Take 1 tablet by mouth once daily.      TURMERIC ORAL Take by mouth Daily.       No current facility-administered medications on file prior to visit.       Social History     Socioeconomic History    Marital status:     Years of education: 10   Tobacco Use    Smoking status: Former     Current packs/day: 0.00     Average packs/day: 0.3 packs/day for 2.1 years (0.5 ttl pk-yrs)     Types: Cigarettes     Start date: 1969     Quit date: 1971     Years since quittin.6    Smokeless tobacco: Former    Tobacco comments:     less than 1ppd x 3 years   Substance and Sexual Activity    Alcohol use: Yes     Alcohol/week: 5.0 standard drinks of alcohol     Types: 3 Glasses of wine, 2 Drinks containing 0.5 oz of alcohol per week      Comment: one bottle of wine on weekends    Drug use: No    Sexual activity: Yes     Partners: Male     Birth control/protection: See Surgical Hx     Social Drivers of Health     Financial Resource Strain: Low Risk  (5/28/2024)    Overall Financial Resource Strain (CARDIA)     Difficulty of Paying Living Expenses: Not hard at all   Food Insecurity: No Food Insecurity (5/28/2024)    Hunger Vital Sign     Worried About Running Out of Food in the Last Year: Never true     Ran Out of Food in the Last Year: Never true   Transportation Needs: No Transportation Needs (11/4/2023)    PRAPARE - Transportation     Lack of Transportation (Medical): No     Lack of Transportation (Non-Medical): No   Physical Activity: Unknown (5/28/2024)    Exercise Vital Sign     Days of Exercise per Week: Patient declined     Minutes of Exercise per Session: 60 min   Stress: No Stress Concern Present (5/28/2024)    [employer] - Occupational Stress Questionnaire     Feeling of Stress : Only a little   Housing Stability: Unknown (5/28/2024)    Housing Stability Vital Sign     Unable to Pay for Housing in the Last Year: No         ROS:  Patient denies constitutional symptoms, cardiac symptoms, respiratory symptoms, GI symptoms, Urinary symptoms, skin issues, allergic issues  The remainder of the musculoskeletal ROS is included in the HPI.    PE:    AA&O x 4.  NAD.  Normal mood and affect.  HEENT:  NCAT, sclera nonicteric  Lungs:  Respirations are equal and unlabored.  Abd: Non distended  :  No evidence of incontinence  CV:  2+ bilateral upper and lower extremity pulses.  Skin:  Intact throughout.    MS:  Left hip with 110° flexion.  30° external rotation and 10° internal rotation.  Very stiff.  Some tenderness to palpation in the groin with some pain on log roll.  Mild tenderness to palpation of the short external rotators.  Neurovascularly intact distally.    Rads:  Reviewed and interpreted today by me.  Patient has  osteoarthritis of the left hip, Tonnis 2-3.    A/P:  69-year-old female with moderate arthritis of the left hip.  Significantly decreased joint space.  We discussed her options at length.  She is getting some improvement from physical therapy and her pain is not as bad as it was in the right hip of the time of that replacement.  We discussed the fact that in the future she will likely need a total hip replacement on the left, but she is getting some improvement with her home exercise program.  We also went over some stretches today in clinic for the short external rotators and buttock muscles to help with that pain.  We discussed the fact that should her pain increase and affect her activities of daily living that given her x-rays it would be completely reasonable to proceed any time with a total hip arthroplasty.  I am going to make her a return appointment in 6 months with x-rays of the left hip.  She will call back sooner if her symptoms worsen then she desires to proceed with left hip replacement surgery.

## 2025-01-18 ENCOUNTER — OFFICE VISIT (OUTPATIENT)
Dept: URGENT CARE | Facility: CLINIC | Age: 70
End: 2025-01-18
Payer: MEDICARE

## 2025-01-18 VITALS
WEIGHT: 137.38 LBS | OXYGEN SATURATION: 96 % | HEIGHT: 62 IN | TEMPERATURE: 98 F | RESPIRATION RATE: 14 BRPM | HEART RATE: 65 BPM | DIASTOLIC BLOOD PRESSURE: 72 MMHG | SYSTOLIC BLOOD PRESSURE: 151 MMHG | BODY MASS INDEX: 25.28 KG/M2

## 2025-01-18 DIAGNOSIS — J30.9 ALLERGIC RHINITIS, UNSPECIFIED SEASONALITY, UNSPECIFIED TRIGGER: Primary | ICD-10-CM

## 2025-01-18 DIAGNOSIS — H92.02 ACUTE OTALGIA, LEFT: ICD-10-CM

## 2025-01-18 PROCEDURE — 99213 OFFICE O/P EST LOW 20 MIN: CPT | Mod: S$GLB,,, | Performed by: NURSE PRACTITIONER

## 2025-01-18 RX ORDER — FLUTICASONE PROPIONATE 50 MCG
2 SPRAY, SUSPENSION (ML) NASAL DAILY
Qty: 11.1 ML | Refills: 0 | Status: SHIPPED | OUTPATIENT
Start: 2025-01-18 | End: 2025-02-17

## 2025-01-18 RX ORDER — LEVOCETIRIZINE DIHYDROCHLORIDE 5 MG/1
5 TABLET, FILM COATED ORAL NIGHTLY
Qty: 30 TABLET | Refills: 0 | Status: SHIPPED | OUTPATIENT
Start: 2025-01-18 | End: 2025-02-17

## 2025-01-18 NOTE — PROGRESS NOTES
"Subjective:      Patient ID: Diann Wasserman is a 69 y.o. female.    Vitals:  height is 5' 2" (1.575 m) and weight is 62.3 kg (137 lb 5.6 oz). Her oral temperature is 98.1 °F (36.7 °C). Her blood pressure is 151/72 (abnormal) and her pulse is 65. Her respiration is 14 and oxygen saturation is 96%.     Chief Complaint: Otalgia (Left ear)    70yo female pt reports discomfort and "pressure" or fullness sensation to the L ear for the past month.  Denies drainage from ear or hearing loss.  Denies fever/chills.  Denies any other respiratory symptoms.  States that sometimes she has the sensation that "something is running around or moving in there," feels like it might be related to nerves in the ear.  Denies previous HX of similar symptoms.    Otalgia   There is pain in the left ear. This is a new problem. Episode onset: 1 month ago. The problem has been waxing and waning. There has been no fever. The pain is at a severity of 4/10. The pain is mild. Pertinent negatives include no coughing, diarrhea, ear discharge, headaches or hearing loss. She has tried nothing for the symptoms. There is no history of a chronic ear infection.       Constitution: Negative for chills and fever.   HENT:  Positive for ear pain. Negative for ear discharge, foreign body in ear, tinnitus and hearing loss.    Respiratory:  Negative for cough.    Gastrointestinal:  Negative for diarrhea.   Neurological:  Negative for headaches.      Objective:     Physical Exam   Constitutional: She is oriented to person, place, and time. She appears well-developed. She is cooperative.  Non-toxic appearance. She does not appear ill. No distress.   HENT:   Head: Normocephalic and atraumatic.   Ears:   Right Ear: Hearing, external ear and ear canal normal. Tympanic membrane is retracted (dull TM, R > L). Tympanic membrane is not erythematous and not bulging. No middle ear effusion.   Left Ear: Hearing, external ear and ear canal normal. Tympanic membrane is " retracted (dull TM). Tympanic membrane is not erythematous and not bulging. A middle ear effusion (clear fluid) is present.   Nose: Nose normal. No mucosal edema, rhinorrhea or nasal deformity. No epistaxis. Right sinus exhibits no maxillary sinus tenderness and no frontal sinus tenderness. Left sinus exhibits no maxillary sinus tenderness and no frontal sinus tenderness.   Mouth/Throat: Uvula is midline, oropharynx is clear and moist and mucous membranes are normal. No trismus in the jaw. Normal dentition. No uvula swelling. No oropharyngeal exudate, posterior oropharyngeal edema or posterior oropharyngeal erythema.   Eyes: Conjunctivae and lids are normal. No scleral icterus.   Neck: Trachea normal and phonation normal. Neck supple. No edema present. No erythema present. No neck rigidity present.   Cardiovascular: Normal rate, regular rhythm, normal heart sounds and normal pulses.   Pulmonary/Chest: Effort normal and breath sounds normal. No respiratory distress. She has no decreased breath sounds. She has no rhonchi.   Abdominal: Normal appearance.   Musculoskeletal: Normal range of motion.         General: No deformity. Normal range of motion.   Neurological: She is alert and oriented to person, place, and time. She exhibits normal muscle tone. Coordination normal.   Skin: Skin is warm, dry, intact, not diaphoretic and not pale.   Psychiatric: Her speech is normal and behavior is normal. Judgment and thought content normal.   Nursing note and vitals reviewed.      Assessment:     1. Allergic rhinitis, unspecified seasonality, unspecified trigger    2. Acute otalgia, left        Plan:     Provided education on prescribed medications, recommended increasing fluid intake and chewing gum occasionally for additional symptom relief.  Provided referral to ENT for further evaluation if symptoms do not improve with treatment, provided  number.  Provided education on return/ER precautions.  Pt verbalized  understanding and agreed to plan.      Allergic rhinitis, unspecified seasonality, unspecified trigger  -     Ambulatory referral/consult to ENT  -     levocetirizine (XYZAL) 5 MG tablet; Take 1 tablet (5 mg total) by mouth every evening.  Dispense: 30 tablet; Refill: 0  -     fluticasone propionate (FLONASE) 50 mcg/actuation nasal spray; 2 sprays (100 mcg total) by Each Nostril route once daily.  Dispense: 11.1 mL; Refill: 0    Acute otalgia, left      Patient Instructions   You have been provided a referral to ENT.  Please call [phone] to schedule an appointment at your convenience.    -----    To help control symptoms, start a oral antihistamine daily.  Good options are OTC or generic Zyrtec, Claritin, or Allegra.  Using these in conjunction with OTC Flonase nasal spray can help to prevent daily allergy symptoms.    If you have sinus headaches and or sinus/nasal congestion, try Zyrtec D, Claritin D, or Allegra D daily, in conjunction with Flonase and Astelin nasal sprays daily.  Nasal irrigation is also important to relieve congestion.      Follow-up with your primary care provider as needed.

## 2025-01-18 NOTE — PATIENT INSTRUCTIONS
You have been provided a referral to ENT.  Please call [phone] to schedule an appointment at your convenience.    -----    To help control symptoms, start a oral antihistamine daily.  Good options are OTC or generic Zyrtec, Claritin, or Allegra.  Using these in conjunction with OTC Flonase nasal spray can help to prevent daily allergy symptoms.    If you have sinus headaches and or sinus/nasal congestion, try Zyrtec D, Claritin D, or Allegra D daily, in conjunction with Flonase and Astelin nasal sprays daily.  Nasal irrigation is also important to relieve congestion.      Follow-up with your primary care provider as needed.

## 2025-02-05 ENCOUNTER — LAB VISIT (OUTPATIENT)
Dept: LAB | Facility: HOSPITAL | Age: 70
End: 2025-02-05
Attending: INTERNAL MEDICINE
Payer: MEDICARE

## 2025-02-05 ENCOUNTER — OFFICE VISIT (OUTPATIENT)
Dept: INTERNAL MEDICINE | Facility: CLINIC | Age: 70
End: 2025-02-05
Payer: MEDICARE

## 2025-02-05 VITALS
DIASTOLIC BLOOD PRESSURE: 68 MMHG | RESPIRATION RATE: 18 BRPM | BODY MASS INDEX: 26.74 KG/M2 | WEIGHT: 145.31 LBS | TEMPERATURE: 99 F | SYSTOLIC BLOOD PRESSURE: 128 MMHG | HEIGHT: 62 IN | OXYGEN SATURATION: 96 % | HEART RATE: 74 BPM

## 2025-02-05 DIAGNOSIS — E78.5 HYPERLIPIDEMIA, UNSPECIFIED HYPERLIPIDEMIA TYPE: ICD-10-CM

## 2025-02-05 DIAGNOSIS — Z12.31 SCREENING MAMMOGRAM, ENCOUNTER FOR: ICD-10-CM

## 2025-02-05 DIAGNOSIS — H69.92 DYSFUNCTION OF LEFT EUSTACHIAN TUBE: Primary | ICD-10-CM

## 2025-02-05 LAB
ALBUMIN SERPL BCP-MCNC: 4.2 G/DL (ref 3.5–5.2)
ALP SERPL-CCNC: 57 U/L (ref 40–150)
ALT SERPL W/O P-5'-P-CCNC: 32 U/L (ref 10–44)
ANION GAP SERPL CALC-SCNC: 10 MMOL/L (ref 8–16)
AST SERPL-CCNC: 23 U/L (ref 10–40)
BILIRUB SERPL-MCNC: 0.4 MG/DL (ref 0.1–1)
BUN SERPL-MCNC: 15 MG/DL (ref 8–23)
CALCIUM SERPL-MCNC: 9.5 MG/DL (ref 8.7–10.5)
CHLORIDE SERPL-SCNC: 106 MMOL/L (ref 95–110)
CHOLEST SERPL-MCNC: 171 MG/DL (ref 120–199)
CHOLEST/HDLC SERPL: 2.1 {RATIO} (ref 2–5)
CO2 SERPL-SCNC: 25 MMOL/L (ref 23–29)
CREAT SERPL-MCNC: 0.6 MG/DL (ref 0.5–1.4)
EST. GFR  (NO RACE VARIABLE): >60 ML/MIN/1.73 M^2
GLUCOSE SERPL-MCNC: 86 MG/DL (ref 70–110)
HDLC SERPL-MCNC: 80 MG/DL (ref 40–75)
HDLC SERPL: 46.8 % (ref 20–50)
LDLC SERPL CALC-MCNC: 80.2 MG/DL (ref 63–159)
NONHDLC SERPL-MCNC: 91 MG/DL
POTASSIUM SERPL-SCNC: 4.1 MMOL/L (ref 3.5–5.1)
PROT SERPL-MCNC: 7.8 G/DL (ref 6–8.4)
SODIUM SERPL-SCNC: 141 MMOL/L (ref 136–145)
TRIGL SERPL-MCNC: 54 MG/DL (ref 30–150)

## 2025-02-05 PROCEDURE — 99999 PR PBB SHADOW E&M-EST. PATIENT-LVL V: CPT | Mod: PBBFAC,,, | Performed by: PHYSICIAN ASSISTANT

## 2025-02-05 PROCEDURE — 3078F DIAST BP <80 MM HG: CPT | Mod: CPTII,S$GLB,, | Performed by: PHYSICIAN ASSISTANT

## 2025-02-05 PROCEDURE — 1160F RVW MEDS BY RX/DR IN RCRD: CPT | Mod: CPTII,S$GLB,, | Performed by: PHYSICIAN ASSISTANT

## 2025-02-05 PROCEDURE — 80053 COMPREHEN METABOLIC PANEL: CPT | Performed by: INTERNAL MEDICINE

## 2025-02-05 PROCEDURE — 3008F BODY MASS INDEX DOCD: CPT | Mod: CPTII,S$GLB,, | Performed by: PHYSICIAN ASSISTANT

## 2025-02-05 PROCEDURE — 3074F SYST BP LT 130 MM HG: CPT | Mod: CPTII,S$GLB,, | Performed by: PHYSICIAN ASSISTANT

## 2025-02-05 PROCEDURE — 36415 COLL VENOUS BLD VENIPUNCTURE: CPT | Performed by: INTERNAL MEDICINE

## 2025-02-05 PROCEDURE — 80061 LIPID PANEL: CPT | Performed by: INTERNAL MEDICINE

## 2025-02-05 PROCEDURE — 1126F AMNT PAIN NOTED NONE PRSNT: CPT | Mod: CPTII,S$GLB,, | Performed by: PHYSICIAN ASSISTANT

## 2025-02-05 PROCEDURE — 99213 OFFICE O/P EST LOW 20 MIN: CPT | Mod: S$GLB,,, | Performed by: PHYSICIAN ASSISTANT

## 2025-02-05 PROCEDURE — 1159F MED LIST DOCD IN RCRD: CPT | Mod: CPTII,S$GLB,, | Performed by: PHYSICIAN ASSISTANT

## 2025-02-05 NOTE — PROGRESS NOTES
"Subjective:       Patient ID: Diann Wasserman is a 69 y.o. female.        Chief Complaint: Ear Fullness    Diann Wasserman is an established patient of Virginia Greco MD here today for urgent care visit.    Left knee pain ongoing, ortho thinks coming from hip, has f/u scheduled 6/2025  Meloxicam daily helps  Very occ use of gabapentin at night    Lipids - tx with lipitor    Osteopenia    BMI 26    Left ear fullness/pain -   Started 12/2024  Feeling of something "crawling around in ear"  Feels funny feeling at opening of ear  Went to , nothing abnormal, given xyzal and flonase but not taking  Somewhat better but still present  Worse with lying down  Also feels a funny feeling and pressure behind ear, feeling of pressure  No drainage and hearing fine  No fever  Thinks flonase made her feel very clammy           Review of Systems   Constitutional:  Negative for chills, diaphoresis, fatigue and fever.   HENT:  Positive for ear pain. Negative for congestion and sore throat.    Eyes:  Negative for visual disturbance.   Respiratory:  Negative for cough, chest tightness and shortness of breath.    Cardiovascular:  Negative for chest pain, palpitations and leg swelling.   Gastrointestinal:  Negative for abdominal pain, blood in stool, constipation, diarrhea, nausea and vomiting.   Genitourinary:  Negative for dysuria, frequency, hematuria and urgency.   Musculoskeletal:  Negative for arthralgias and back pain.   Skin:  Negative for rash.   Neurological:  Negative for dizziness, syncope, weakness and headaches.   Psychiatric/Behavioral:  Negative for dysphoric mood and sleep disturbance. The patient is not nervous/anxious.        Objective:      Physical Exam  Vitals and nursing note reviewed.   Constitutional:       Appearance: Normal appearance. She is well-developed.   HENT:      Head: Normocephalic.      Right Ear: Tympanic membrane and external ear normal.      Left Ear: Tympanic membrane and " "external ear normal.      Nose: No mucosal edema or rhinorrhea.      Mouth/Throat:      Pharynx: Oropharynx is clear.   Eyes:      Pupils: Pupils are equal, round, and reactive to light.   Cardiovascular:      Rate and Rhythm: Normal rate and regular rhythm.      Heart sounds: Normal heart sounds. No murmur heard.     No friction rub. No gallop.   Pulmonary:      Effort: Pulmonary effort is normal. No respiratory distress.      Breath sounds: Normal breath sounds.   Abdominal:      Palpations: Abdomen is soft.      Tenderness: There is no abdominal tenderness.   Skin:     General: Skin is warm and dry.   Neurological:      Mental Status: She is alert.         Assessment:       1. Dysfunction of left eustachian tube    2. Screening mammogram, encounter for        Plan:       Diann was seen today for ear fullness.    Diagnoses and all orders for this visit:    Dysfunction of left eustachian tube  -     fluticasone (VERAMYST) 27.5 mcg/actuation nasal spray; 2 sprays by Nasal route once daily.    Screening mammogram, encounter for  -     Mammo Digital Screening Bilat w/ [facility]; Future    No abnormality on exam today  Will start veramyst nasal spray  Okay to start xyzal as well  ENT referral already placed by  so she will schedule    Pt has been given instructions populated from patient instructions database and has verbalized understanding of the after visit summary and information contained wherein.    Follow up with a primary care provider. May go to ER for acute shortness of breath, lightheadedness, fever, or any other emergent complaints or changes in condition.    "This note will be shared with the patient"    Future Appointments   Date Time Provider Department Center   2/11/2025  8:00 AM [facility]   6/18/2025  8:30 AM Xu Ritchie MD [facility] ORTHO [facility]                 "

## 2025-02-11 ENCOUNTER — HOSPITAL ENCOUNTER (OUTPATIENT)
Dept: RADIOLOGY | Facility: HOSPITAL | Age: 70
Discharge: HOME | End: 2025-02-11
Attending: PHYSICIAN ASSISTANT
Payer: MEDICARE

## 2025-02-11 VITALS — BODY MASS INDEX: 25.97 KG/M2 | WEIGHT: 142 LBS

## 2025-02-11 DIAGNOSIS — Z12.31 SCREENING MAMMOGRAM, ENCOUNTER FOR: ICD-10-CM

## 2025-02-11 PROCEDURE — 77067 SCR MAMMO BI INCL CAD: CPT | Mod: 26,,, | Performed by: RADIOLOGY

## 2025-02-11 PROCEDURE — 77063 BREAST TOMOSYNTHESIS BI: CPT | Mod: TC

## 2025-02-11 PROCEDURE — 77063 BREAST TOMOSYNTHESIS BI: CPT | Mod: 26,,, | Performed by: RADIOLOGY

## 2025-02-13 ENCOUNTER — TELEPHONE (OUTPATIENT)
Dept: RADIOLOGY | Facility: HOSPITAL | Age: 70
End: 2025-02-13
Payer: MEDICARE

## 2025-02-13 NOTE — TELEPHONE ENCOUNTER
Spoke with patient and explained mammogram findings.Patient expressed understanding of results. Patient scheduled abnormal mammogram follow up appointment at [facility] [place] on 2/17/2025.

## 2025-02-17 ENCOUNTER — RESULTS FOLLOW-UP (OUTPATIENT)
Dept: INTERNAL MEDICINE | Facility: CLINIC | Age: 70
End: 2025-02-17

## 2025-02-17 ENCOUNTER — HOSPITAL ENCOUNTER (OUTPATIENT)
Dept: RADIOLOGY | Facility: HOSPITAL | Age: 70
Discharge: HOME | End: 2025-02-17
Attending: PHYSICIAN ASSISTANT
Payer: MEDICARE

## 2025-02-17 DIAGNOSIS — R92.8 ABNORMAL FINDING ON BREAST IMAGING: ICD-10-CM

## 2025-02-17 PROCEDURE — 77061 BREAST TOMOSYNTHESIS UNI: CPT | Mod: TC,LT

## 2025-02-20 ENCOUNTER — OFFICE VISIT (OUTPATIENT)
Dept: OTOLARYNGOLOGY | Facility: CLINIC | Age: 70
End: 2025-02-20
Payer: MEDICARE

## 2025-02-20 VITALS
DIASTOLIC BLOOD PRESSURE: 78 MMHG | HEART RATE: 65 BPM | SYSTOLIC BLOOD PRESSURE: 155 MMHG | WEIGHT: 144.75 LBS | BODY MASS INDEX: 26.47 KG/M2

## 2025-02-20 DIAGNOSIS — R09.A9 FOREIGN BODY SENSATION IN LEFT EAR CANAL: Primary | ICD-10-CM

## 2025-02-20 NOTE — PROCEDURES
Foreign Body Removal    Date/Time: 2/20/2025 8:40 AM    Performed by: Clau Cota NP  Authorized by: Clau Cota NP  Body area: ear  Location details: left ear    Patient sedated: no  Patient restrained: no  Patient cooperative: yes  Localization method: ENT speculum and magnification  Removal mechanism: curette and forceps  Complexity: simple  Post-procedure assessment: foreign body removed  Patient tolerance: Patient tolerated the procedure well with no immediate complications  Comments: Removed 3 pieces of strands of hair in the left ear canal

## 2025-02-20 NOTE — PROGRESS NOTES
Chief Complaint   Patient presents with    Ear Problem     Crackling. Like some thing is in it        HPI: Diann Wasserman referred to me by Pramod Lovell in consultation who is here for evaluation of crackling noise in her left ear. This started in January 2025. She denies ear drainage, pain, and hearing loss. She feels like something is moving in her left ear. She also denies runny nose, nasal congestion and sinus pressure. She reports having two dogs at home that sheds a lot of hair.       Past Medical History:   Diagnosis Date    Arthritis     Cataract     Iris nevus, left     Right hip pain      Social History[1]  Past Surgical History:   Procedure Laterality Date    BREAST SURGERY Bilateral     breast lift    CHOLECYSTECTOMY  2012    COLONOSCOPY N/A 04/06/2016    Procedure: COLONOSCOPY;  Surgeon: Luis Bogran-Reyes, MD;  Location: [facility];  Service: Endoscopy;  Laterality: N/A;    COSMETIC SURGERY  1985    bilateral breast lift    HIP SURGERY  11/2014    right total    JOINT REPLACEMENT      TOTAL REDUCTION MAMMOPLASTY      LIft    TUBAL LIGATION       Family History   Problem Relation Name Age of Onset    Hypothyroidism Mother      Hypertension Father buzz     Aortic aneurysm Father buzz     Stroke Father buzz     No Known Problems Sister      No Known Problems Brother      Breast cancer Maternal Aunt  40    Breast cancer Cousin Annmarie            Review of Systems  General: negative for chills, fever or weight loss  Psychological: negative for mood changes or depression  Ophthalmic: negative for blurry vision, photophobia or eye pain  ENT: see HPI  Respiratory: no cough, shortness of breath, or wheezing  Cardiovascular: no chest pain or dyspnea on exertion  Gastrointestinal: no abdominal pain, change in bowel habits, or black/ bloody stools  Musculoskeletal: negative for gait disturbance or muscular weakness  Neurological: no syncope or seizures; no ataxia  Dermatological: negative for pruritis,   rash and jaundice  Hematologic/lymphatic: no easy bruising, no new adenopathy      Physical Exam:    Vitals:    02/20/25 0842   BP: (!) 155/78   Pulse: 65       Constitutional: Well appearing / communicating without difficutly.  NAD.  Eyes: EOM I Bilaterally  Head/Face: Normocephalic.  Negative paranasal sinus pressure/tenderness.  Salivary glands WNL.  House Brackmann I Bilaterally.    Right Ear: Auricle normal appearance. External Auditory Canal within normal limits no lesions or masses,TM w/o masses/lesions/perforations. TM mobility noted.   Left Ear: Auricle normal appearance. External Auditory Canal with foreign body (hair). EAC within normal limits no lesions or masses,TM w/o masses/lesions/perforations. TM mobility noted.  Nose: No gross nasal septal deviation. Inferior Turbinates 3+ bilaterally. No polyp/polypoid nasal mucosa noted. No septal perforation. No masses/lesions. External nasal skin appears normal without masses/lesions.  Oral Cavity: Gingiva/lips within normal limits.  Dentition/gingiva healthy appearing. Mucus membranes moist. Floor of mouth soft, no masses palpated. Oral Tongue mobile. Hard Palate appears normal.    Oropharynx: Base of tongue appears normal. No masses/lesions noted. Tonsillar fossa/pharyngeal wall without lesions. Posterior oropharynx WNL.  Soft palate without masses. Midline uvula.   Neck/Lymphatic: No LAD I-VI bilaterally.  No thyromegaly.  No masses noted on exam.    Mirror laryngoscopy/nasopharyngoscopy: Active gag reflex.  Unable to perform.    Neuro/Psychiatric: AOx3.  Normal mood and affect.   Cardiovascular: Normal carotid pulses bilaterally, no increasing jugular venous distention noted at cervical region bilaterally.    Respiratory: Normal respiratory effort, no stridor, no retractions noted.    Foreign Body Removal    Date/Time: 2/20/2025 8:40 AM    Performed by: Clau Cota NP  Authorized by: Clau Cota NP  Body area: ear  Location details: left  ear    Patient sedated: no  Patient restrained: no  Patient cooperative: yes  Localization method: ENT speculum and magnification  Removal mechanism: curette and forceps  Complexity: simple  Post-procedure assessment: foreign body removed  Patient tolerance: Patient tolerated the procedure well with no immediate complications  Comments: Removed 3 pieces of strands of hair in the left ear canal        Assessment:    ICD-10-CM ICD-9-CM    1. Foreign body sensation in left ear canal  R09.A9 380.89 Foreign Body Removal        The encounter diagnosis was Foreign body sensation in left ear canal.      Plan:    -foreign body (dog hair) removed under microscopy today without difficulty.   -otoscopic exam otherwise within normal limits  -I recommended use either over the counter Debrox or mineral oil on a weekly basis.  I also instructed the patient to avoid Qtips.  -follow up PRN    -follow up PRN    Clau Cota NP        Answers submitted by the patient for this visit:  Review of Symptoms Questionnaire  (Submitted on 2/15/2025)  None of these: Yes  postnasal drip: Yes  None of these : Yes  None of these: Yes  None of these : Yes  None of these: Yes  None of these: Yes  None of these: Yes  None of these : Yes  None of these: Yes  None of these : Yes  None of these: Yes  None of these: Yes  None of these: Yes         [1]   Social History  Socioeconomic History    Marital status:     Years of education: 10   Tobacco Use    Smoking status: Former     Current packs/day: 0.00     Average packs/day: 0.3 packs/day for 2.1 years (0.5 ttl pk-yrs)     Types: Cigarettes     Start date: 1969     Quit date: 1971     Years since quittin.8     Passive exposure: Past    Smokeless tobacco: Former    Tobacco comments:     less than 1ppd x 3 years   Substance and Sexual Activity    Alcohol use: Yes     Alcohol/week: 5.0 standard drinks of alcohol     Types: 3 Glasses of wine, 2 Drinks containing 0.5 oz of alcohol per  week     Comment: one bottle of wine on weekends    Drug use: No    Sexual activity: Yes     Partners: Male     Birth control/protection: See Surgical Hx     Social Drivers of Health     Financial Resource Strain: Low Risk  (5/28/2024)    Overall Financial Resource Strain (CARDIA)     Difficulty of Paying Living Expenses: Not hard at all   Food Insecurity: No Food Insecurity (5/28/2024)    Hunger Vital Sign     Worried About Running Out of Food in the Last Year: Never true     Ran Out of Food in the Last Year: Never true   Transportation Needs: No Transportation Needs (11/4/2023)    PRAPARE - Transportation     Lack of Transportation (Medical): No     Lack of Transportation (Non-Medical): No   Physical Activity: Unknown (5/28/2024)    Exercise Vital Sign     Days of Exercise per Week: Patient declined     Minutes of Exercise per Session: 60 min   Stress: No Stress Concern Present (5/28/2024)    [employer] - Occupational Stress Questionnaire     Feeling of Stress : Only a little   Housing Stability: Unknown (5/28/2024)    Housing Stability Vital Sign     Unable to Pay for Housing in the Last Year: No

## 2025-02-21 ENCOUNTER — TELEPHONE (OUTPATIENT)
Dept: INTERNAL MEDICINE | Facility: CLINIC | Age: 70
End: 2025-02-21
Payer: MEDICARE

## 2025-02-21 ENCOUNTER — PATIENT MESSAGE (OUTPATIENT)
Dept: INTERNAL MEDICINE | Facility: CLINIC | Age: 70
End: 2025-02-21
Payer: MEDICARE

## 2025-02-21 DIAGNOSIS — N93.9 ABNORMAL VAGINAL BLEEDING: Primary | ICD-10-CM

## 2025-02-21 NOTE — TELEPHONE ENCOUNTER
----- Message from Joana sent at 2/21/2025  8:21 AM CST -----  Type:  Same Day Appointment RequestCaller is requesting a same day appointment.  Caller declined first available appointment listed below.  Name of Caller:Annmarie is the first available appointment?n/aSymptoms:Vaginal bleedingBest Call Back Number: [phone] Information:

## 2025-02-21 NOTE — TELEPHONE ENCOUNTER
Pt was scheduled for on ob/gyn visit with Carol Mccormack NP. Called and spoke with patient to inform her that she would either need to secure referral from her PCP for on ob\gyn  or go to the ER if the vaginal bleeding was severe, per NP

## 2025-02-22 NOTE — TELEPHONE ENCOUNTER
Pt is requesting a referral to gyn for abnormal vag bleeding, pended     LOV with Virginia Greco MD , 11/6/2023

## 2025-02-24 DIAGNOSIS — M54.16 LUMBAR RADICULOPATHY: ICD-10-CM

## 2025-02-24 RX ORDER — MELOXICAM 7.5 MG/1
7.5 TABLET ORAL
Qty: 30 TABLET | Refills: 0 | Status: SHIPPED | OUTPATIENT
Start: 2025-02-24

## 2025-02-24 NOTE — TELEPHONE ENCOUNTER
Refill Routing Note   Medication(s) are not appropriate for processing by [facility] for the following reason(s):        Outside of protocol    [facility] action(s):  Route   Requires appointment : Yes     Requires labs : Yes             Appointments  past 12m or future 3m with PCP    Date Provider   Last Visit   11/6/2023 Virginia Greco MD   Next Visit   Visit date not found Virginia Greco MD   ED visits in past 90 days: 0        Note composed:11:21 AM 02/24/2025

## 2025-02-24 NOTE — TELEPHONE ENCOUNTER
Care Due:                  Date            Visit Type   Department     Provider  --------------------------------------------------------------------------------                                EP -                              PRIMARY      [facility]  Last Visit: 11-      CARE ([facility])   MEDICINE       [facility]  Next Visit: None Scheduled  None         None Found                                                            Last  Test          Frequency    Reason                     Performed    Due Date  --------------------------------------------------------------------------------    Office Visit  12 months..  atorvastatin, meloxicam..  11-   10-    CBC.........  12 months..  meloxicam................  10-   10-    [employer] Embedded Care Due Messages. Reference number: [id number].   2/24/2025 7:01:17 AM CST

## 2025-02-26 ENCOUNTER — OFFICE VISIT (OUTPATIENT)
Dept: OBSTETRICS AND GYNECOLOGY | Facility: CLINIC | Age: 70
End: 2025-02-26
Payer: MEDICARE

## 2025-02-26 VITALS
SYSTOLIC BLOOD PRESSURE: 136 MMHG | WEIGHT: 144.38 LBS | DIASTOLIC BLOOD PRESSURE: 80 MMHG | BODY MASS INDEX: 26.57 KG/M2 | HEIGHT: 62 IN

## 2025-02-26 DIAGNOSIS — N95.0 PMB (POSTMENOPAUSAL BLEEDING): ICD-10-CM

## 2025-02-26 PROCEDURE — 99999 PR PBB SHADOW E&M-EST. PATIENT-LVL III: CPT | Mod: PBBFAC,,, | Performed by: OBSTETRICS & GYNECOLOGY

## 2025-02-26 PROCEDURE — 3008F BODY MASS INDEX DOCD: CPT | Mod: CPTII,S$GLB,, | Performed by: OBSTETRICS & GYNECOLOGY

## 2025-02-26 PROCEDURE — 3075F SYST BP GE 130 - 139MM HG: CPT | Mod: CPTII,S$GLB,, | Performed by: OBSTETRICS & GYNECOLOGY

## 2025-02-26 PROCEDURE — 3079F DIAST BP 80-89 MM HG: CPT | Mod: CPTII,S$GLB,, | Performed by: OBSTETRICS & GYNECOLOGY

## 2025-02-26 PROCEDURE — 1159F MED LIST DOCD IN RCRD: CPT | Mod: CPTII,S$GLB,, | Performed by: OBSTETRICS & GYNECOLOGY

## 2025-02-26 PROCEDURE — 1160F RVW MEDS BY RX/DR IN RCRD: CPT | Mod: CPTII,S$GLB,, | Performed by: OBSTETRICS & GYNECOLOGY

## 2025-02-26 PROCEDURE — 1126F AMNT PAIN NOTED NONE PRSNT: CPT | Mod: CPTII,S$GLB,, | Performed by: OBSTETRICS & GYNECOLOGY

## 2025-02-26 PROCEDURE — 1101F PT FALLS ASSESS-DOCD LE1/YR: CPT | Mod: CPTII,S$GLB,, | Performed by: OBSTETRICS & GYNECOLOGY

## 2025-02-26 PROCEDURE — 3288F FALL RISK ASSESSMENT DOCD: CPT | Mod: CPTII,S$GLB,, | Performed by: OBSTETRICS & GYNECOLOGY

## 2025-02-26 PROCEDURE — 99203 OFFICE O/P NEW LOW 30 MIN: CPT | Mod: S$GLB,,, | Performed by: OBSTETRICS & GYNECOLOGY

## 2025-02-26 NOTE — PROGRESS NOTES
Gynecology    SUBJECTIVE:     Chief Complaint: Vaginal Bleeding (Pt states it lasted about 3 months it comes and goes )       History of Present Illness:  69 year old who presents with new onset vaginal bleeding.  Patient reports in the past 3-4 months, she episodes every 2 weeks or so of brown spotting.  This did not require a pad and was not heavy like a period.  She is sexually active and has noticed it a few times after intercourse, but occurs other times as well.  Does not have pain.  Did notice some mild cramping recently, but nothing severe.     Of note, she had a biopsy in 2019 when an ultrasound showed a thickened endometrial stripe of 10 mm. The biopsy was negative.      Review of Systems:  Review of Systems   Genitourinary:  Positive for postmenopausal bleeding. Negative for dyspareunia, pelvic pain, vaginal bleeding, vaginal discharge, vaginal pain and vaginal odor.        OBJECTIVE:     Physical Exam:  Physical Exam  Vitals and nursing note reviewed.   Constitutional:       Appearance: She is well-developed.   Pulmonary:      Effort: Pulmonary effort is normal.   Abdominal:      Palpations: Abdomen is soft.   Genitourinary:     Labia:         Right: No rash, tenderness, lesion or injury.         Left: No rash, tenderness, lesion or injury.       Vagina: Normal. No signs of injury and foreign body. No vaginal discharge, erythema, tenderness or bleeding.      Cervix: No cervical motion tenderness, discharge or friability.      Uterus: Not deviated, not enlarged, not fixed and not tender.       Adnexa:         Right: No mass, tenderness or fullness.          Left: No mass, tenderness or fullness.        Comments: Urethra: normal appearing urethra with no masses, tenderness or lesions  Urethral meatus: normal size, anterior vaginal wall with no prolapse, no lesions  Old brown blood visible at the cervix os.  Neurological:      Mental Status: She is alert and oriented to person, place, and time.    Psychiatric:         Behavior: Behavior normal.         Thought Content: Thought content normal.         Judgment: Judgment normal.         ASSESSMENT:       ICD-10-CM ICD-9-CM    1. PMB (postmenopausal bleeding)  N95.0 627.1 Ambulatory referral/consult to Gynecology             Plan:      Diann was seen today for vaginal bleeding.    Diagnoses and all orders for this visit:    PMB (postmenopausal bleeding)  -     Ambulatory referral/consult to Gynecology    30 minutes of total time was spent on the encounter which included face-to-face time and non-face-to-face time preparing to see the patient, obtaining and or reviewing   separately obtained history, documenting clinical information in the electronic or other health record, independently interpreting results (not separately reported) and   communicating results to the patient, or care coordination (not separately reported).    Counseled about ultrasound vs EMBx  Because the patient is having on going bleeding, will perform EMBx rather than starting with ultrasound  She will return tomorrow for this procedure.    No orders of the defined types were placed in this encounter.      No follow-ups on file.    Samira Johnson

## 2025-02-27 ENCOUNTER — PROCEDURE VISIT (OUTPATIENT)
Dept: OBSTETRICS AND GYNECOLOGY | Facility: CLINIC | Age: 70
End: 2025-02-27
Payer: MEDICARE

## 2025-02-27 VITALS
DIASTOLIC BLOOD PRESSURE: 80 MMHG | HEIGHT: 62 IN | SYSTOLIC BLOOD PRESSURE: 140 MMHG | BODY MASS INDEX: 26.41 KG/M2 | WEIGHT: 143.5 LBS

## 2025-02-27 DIAGNOSIS — Z92.89 HISTORY OF ENDOMETRIAL BIOPSY: ICD-10-CM

## 2025-02-27 DIAGNOSIS — N95.0 PMB (POSTMENOPAUSAL BLEEDING): Primary | ICD-10-CM

## 2025-02-27 LAB
B-HCG UR QL: NEGATIVE
CTP QC/QA: YES

## 2025-02-27 PROCEDURE — 58100 BIOPSY OF UTERUS LINING: CPT | Mod: S$GLB,,, | Performed by: OBSTETRICS & GYNECOLOGY

## 2025-02-27 PROCEDURE — 81025 URINE PREGNANCY TEST: CPT | Mod: S$GLB,,, | Performed by: OBSTETRICS & GYNECOLOGY

## 2025-02-27 NOTE — PROCEDURES
Endometrial biopsy    Date/Time: 2/27/2025 8:45 AM    Performed by: Samira Johnson MD  Authorized by: Samira Johnson MD    Consent:     Prior to procedure the appropriate consent was completed and verified      Consent given by:  Patient    Patient questions answered: yes      Patient agrees, verbalizes understanding, and wants to proceed: yes    Indication:     Indications: Post-menopausal bleeding      Chronicity of post-menopausal bleeding:  Chronic    Progression of post-menopausal bleeding:  Waxing and waning  Pre-procedure:     Pre-procedure timeout performed: yes    Procedure:     Procedure: endometrial biopsy with Pipelle      Cervix cleaned and prepped: yes      A paracervical block was performed: no      An intracervical block was performed: no      The cervix was dilated using cervical dilators: no      Use of single-tooth tenaculum: yes      Uterus sounded: yes      Uterus sound depth (cm):  7    Patient tolerated procedure well with no complications: yes    Comments:     Procedure comments:  Minimal brown tissue obtained.

## 2025-03-11 ENCOUNTER — RESULTS FOLLOW-UP (OUTPATIENT)
Dept: OBSTETRICS AND GYNECOLOGY | Facility: HOSPITAL | Age: 70
End: 2025-03-11

## 2025-03-18 DIAGNOSIS — N95.0 PMB (POSTMENOPAUSAL BLEEDING): Primary | ICD-10-CM

## 2025-03-18 RX ORDER — FAMOTIDINE 20 MG/1
20 TABLET, FILM COATED ORAL
Status: SHIPPED | OUTPATIENT
Start: 2025-03-18

## 2025-03-18 RX ORDER — SODIUM CHLORIDE 9 MG/ML
INJECTION, SOLUTION INTRAVENOUS CONTINUOUS
OUTPATIENT
Start: 2025-03-18

## 2025-03-21 ENCOUNTER — PATIENT MESSAGE (OUTPATIENT)
Dept: PREADMISSION TESTING | Facility: OTHER | Age: 70
End: 2025-03-21
Payer: MEDICARE

## 2025-03-24 DIAGNOSIS — Z00.00 ENCOUNTER FOR MEDICARE ANNUAL WELLNESS EXAM: ICD-10-CM

## 2025-03-25 ENCOUNTER — ANESTHESIA EVENT (OUTPATIENT)
Dept: SURGERY | Facility: OTHER | Age: 70
End: 2025-03-25
Payer: MEDICARE

## 2025-03-26 ENCOUNTER — PATIENT MESSAGE (OUTPATIENT)
Dept: OPTOMETRY | Facility: CLINIC | Age: 70
End: 2025-03-26
Payer: MEDICARE

## 2025-03-27 ENCOUNTER — OFFICE VISIT (OUTPATIENT)
Dept: OBSTETRICS AND GYNECOLOGY | Facility: CLINIC | Age: 70
End: 2025-03-27
Payer: MEDICARE

## 2025-03-27 ENCOUNTER — TELEPHONE (OUTPATIENT)
Dept: ORTHOPEDICS | Facility: CLINIC | Age: 70
End: 2025-03-27
Payer: MEDICARE

## 2025-03-27 ENCOUNTER — HOSPITAL ENCOUNTER (OUTPATIENT)
Dept: PREADMISSION TESTING | Facility: OTHER | Age: 70
Discharge: HOME | End: 2025-03-27
Attending: OBSTETRICS & GYNECOLOGY
Payer: MEDICARE

## 2025-03-27 VITALS
SYSTOLIC BLOOD PRESSURE: 147 MMHG | OXYGEN SATURATION: 98 % | RESPIRATION RATE: 16 BRPM | HEART RATE: 66 BPM | DIASTOLIC BLOOD PRESSURE: 72 MMHG | TEMPERATURE: 98 F

## 2025-03-27 VITALS
BODY MASS INDEX: 25.92 KG/M2 | WEIGHT: 140.88 LBS | HEIGHT: 62 IN | SYSTOLIC BLOOD PRESSURE: 143 MMHG | DIASTOLIC BLOOD PRESSURE: 87 MMHG

## 2025-03-27 DIAGNOSIS — Z01.818 PRE-OP EXAM: ICD-10-CM

## 2025-03-27 DIAGNOSIS — M25.552 LEFT HIP PAIN: Primary | ICD-10-CM

## 2025-03-27 DIAGNOSIS — N95.0 PMB (POSTMENOPAUSAL BLEEDING): ICD-10-CM

## 2025-03-27 DIAGNOSIS — N95.0 POST-MENOPAUSAL BLEEDING: Primary | ICD-10-CM

## 2025-03-27 LAB
ABSOLUTE EOSINOPHIL (OHS): 0.06 K/UL
ABSOLUTE MONOCYTE (OHS): 0.65 K/UL (ref 0.3–1)
ABSOLUTE NEUTROPHIL COUNT (OHS): 4.01 K/UL (ref 1.8–7.7)
BASOPHILS # BLD AUTO: 0.03 K/UL
BASOPHILS NFR BLD AUTO: 0.4 %
ERYTHROCYTE [DISTWIDTH] IN BLOOD BY AUTOMATED COUNT: 12 % (ref 11.5–14.5)
HCT VFR BLD AUTO: 39.5 % (ref 37–48.5)
HGB BLD-MCNC: 13.1 GM/DL (ref 12–16)
IMM GRANULOCYTES # BLD AUTO: 0.01 K/UL (ref 0–0.04)
IMM GRANULOCYTES NFR BLD AUTO: 0.1 % (ref 0–0.5)
INDIRECT COOMBS: NORMAL
LYMPHOCYTES # BLD AUTO: 2.13 K/UL (ref 1–4.8)
MCH RBC QN AUTO: 29.5 PG (ref 27–50)
MCHC RBC AUTO-ENTMCNC: 33.2 G/DL (ref 32–36)
MCV RBC AUTO: 89 FL (ref 82–98)
NUCLEATED RBC (/100WBC) (OHS): 0 /100 WBC
PLATELET # BLD AUTO: 300 K/UL (ref 150–450)
PMV BLD AUTO: 9.4 FL (ref 9.2–12.9)
RBC # BLD AUTO: 4.44 M/UL (ref 4–5.4)
RELATIVE EOSINOPHIL (OHS): 0.9 %
RELATIVE LYMPHOCYTE (OHS): 30.9 % (ref 18–48)
RELATIVE MONOCYTE (OHS): 9.4 % (ref 4–15)
RELATIVE NEUTROPHIL (OHS): 58.3 % (ref 38–73)
RH BLD: NORMAL
SPECIMEN OUTDATE: NORMAL
WBC # BLD AUTO: 6.89 K/UL (ref 3.9–12.7)

## 2025-03-27 PROCEDURE — 99999 PR PBB SHADOW E&M-EST. PATIENT-LVL III: CPT | Mod: PBBFAC,,, | Performed by: OBSTETRICS & GYNECOLOGY

## 2025-03-27 PROCEDURE — 85025 COMPLETE CBC W/AUTO DIFF WBC: CPT | Performed by: OBSTETRICS & GYNECOLOGY

## 2025-03-27 PROCEDURE — 36415 COLL VENOUS BLD VENIPUNCTURE: CPT | Performed by: OBSTETRICS & GYNECOLOGY

## 2025-03-27 PROCEDURE — 86850 RBC ANTIBODY SCREEN: CPT | Performed by: OBSTETRICS & GYNECOLOGY

## 2025-03-27 RX ORDER — ACETAMINOPHEN 500 MG
500 TABLET ORAL EVERY 6 HOURS PRN
COMMUNITY

## 2025-03-27 NOTE — H&P (VIEW-ONLY)
Gynecology    SUBJECTIVE:   Chief Complaint: Pre-op Exam       History of Present Illness:  Ms. Wasserman presents to sign consents for her scheduled D&C hysteroscopy.      Previous HPI:   69 year old who presents with new onset vaginal bleeding.  Patient reports in the past 3-4 months, she episodes every 2 weeks or so of brown spotting.  This did not require a pad and was not heavy like a period.  She is sexually active and has noticed it a few times after intercourse, but occurs other times as well.  Does not have pain.  Did notice some mild cramping recently, but nothing severe.      Of note, she had a biopsy in 2019 when an ultrasound showed a thickened endometrial stripe of 10 mm. The biopsy was negative.        Imaging: none    Pathology: Scant fragments of benign cervical tissue admixed with abundant mucus, no definitive endometrial tissue identified     Previous pap smear:  normal    Caprini Score for VTE: 0.7 %    Review of patient's allergies indicates:   Allergen Reactions    Erythromycin Rash    Penicillins Rash and Other (See Comments)     Redness       Past Medical History:   Diagnosis Date    Arthritis     Cataract     Iris nevus, left     Right hip pain      Past Surgical History:   Procedure Laterality Date    BREAST SURGERY Bilateral     breast lift    CHOLECYSTECTOMY      COLONOSCOPY N/A 2016    Procedure: COLONOSCOPY;  Surgeon: Luis Bogran-Reyes, MD;  Location: [facility];  Service: Endoscopy;  Laterality: N/A;    COSMETIC SURGERY      bilateral breast lift    HIP SURGERY  2014    right total    JOINT REPLACEMENT      TOTAL REDUCTION MAMMOPLASTY      LIft    TUBAL LIGATION       OB History          6    Para   6    Term   6            AB        Living   6         SAB        IAB        Ectopic        Multiple        Live Births   6               Family History   Problem Relation Name Age of Onset    Hypothyroidism Mother      Hypertension Father buzz      Aortic aneurysm Father buzz     Stroke Father buzz     No Known Problems Sister      No Known Problems Brother      Breast cancer Maternal Aunt  40    Breast cancer Cousin Annmarie      Social History[1]    Current Outpatient Medications   Medication Sig    atorvastatin (LIPITOR) 20 MG tablet Take 1 tablet by mouth once daily    multivit-min/iron/folic/[id number] (HAIR, SKIN AND NAILS ADVANCED ORAL) Take by mouth Daily.    multivitamin (THERAGRAN) per tablet Take 1 tablet by mouth once daily.    TURMERIC ORAL Take by mouth Daily.    acetaminophen (TYLENOL) 500 MG tablet Take 500 mg by mouth every 6 (six) hours as needed for Pain.    meloxicam (MOBIC) 7.5 MG tablet Take 1 tablet by mouth once daily     No current facility-administered medications for this visit.       Review of Systems:  Review of Systems   Genitourinary:  Positive for postmenopausal bleeding. Negative for pelvic pain, vaginal bleeding, vaginal discharge, vaginal pain and vaginal odor.        OBJECTIVE:   Physical Exam:  Physical Exam  Vitals and nursing note reviewed.   Constitutional:       Appearance: She is well-developed.   Pulmonary:      Effort: Pulmonary effort is normal.   Skin:     Coloration: Skin is not pale.   Neurological:      Mental Status: She is oriented to person, place, and time.   Psychiatric:         Behavior: Behavior normal.         Thought Content: Thought content normal.         Judgment: Judgment normal.           ASSESSMENT:       ICD-10-CM ICD-9-CM    1. Post-menopausal bleeding  N95.0 627.1       2. Pre-op exam  Z01.818 V72.84              Plan:      Diann was seen today for pre-op exam.    Diagnoses and all orders for this visit:    Post-menopausal bleeding    Pre-op exam        No orders of the defined types were placed in this encounter.    15 minutes of total time was spent on the encounter which included face-to-face time and non-face-to-face time preparing to see the patient, obtaining and or reviewing   separately  obtained history, documenting clinical information in the electronic or other health record, independently interpreting results (not separately reported) and   communicating results to the patient, or care coordination (not separately reported).      - Work up today for D&C hysteroscope on 3/28/25  - Preadmit scheduled for today after this appointment  - Last pap: 2019  - CBC, Type and Screen   - CXR and EKG need determined by anesthesia  - Caprini score: 0.7; DVT prophylaxis: mechanical prophylaxis  - Consents signed for procedure, risks and benefits reviewed.    - Counseling: discussed risks of surgery including but not limited to: pain, infection, bleeding, injury to neighboring organs such as bowel, bladder, nerves, blood vessels, need for intraop consultation, and other risks of anesthesia/surgery- ICU admission, thromboembolism, heart attack, death, etc.      No follow-ups on file.    Samira Johnson               [1]   Social History  Tobacco Use    Smoking status: Former     Current packs/day: 0.00     Average packs/day: 0.3 packs/day for 2.1 years (0.5 ttl pk-yrs)     Types: Cigarettes     Start date: 1969     Quit date: 1971     Years since quittin.9     Passive exposure: Past    Smokeless tobacco: Never    Tobacco comments:     less than 1ppd x 3 years   Substance Use Topics    Alcohol use: Yes     Alcohol/week: 5.0 standard drinks of alcohol     Types: 3 Glasses of wine, 2 Drinks containing 0.5 oz of alcohol per week     Comment: one bottle of wine on weekends    Drug use: No

## 2025-03-27 NOTE — PROGRESS NOTES
Gynecology    SUBJECTIVE:   Chief Complaint: Pre-op Exam       History of Present Illness:  Ms. Wasserman presents to sign consents for her scheduled D&C hysteroscopy.      Previous HPI:   69 year old who presents with new onset vaginal bleeding.  Patient reports in the past 3-4 months, she episodes every 2 weeks or so of brown spotting.  This did not require a pad and was not heavy like a period.  She is sexually active and has noticed it a few times after intercourse, but occurs other times as well.  Does not have pain.  Did notice some mild cramping recently, but nothing severe.      Of note, she had a biopsy in 2019 when an ultrasound showed a thickened endometrial stripe of 10 mm. The biopsy was negative.        Imaging: none    Pathology: Scant fragments of benign cervical tissue admixed with abundant mucus, no definitive endometrial tissue identified     Previous pap smear:  normal    Caprini Score for VTE: 0.7 %    Review of patient's allergies indicates:   Allergen Reactions    Erythromycin Rash    Penicillins Rash and Other (See Comments)     Redness       Past Medical History:   Diagnosis Date    Arthritis     Cataract     Iris nevus, left     Right hip pain      Past Surgical History:   Procedure Laterality Date    BREAST SURGERY Bilateral     breast lift    CHOLECYSTECTOMY      COLONOSCOPY N/A 2016    Procedure: COLONOSCOPY;  Surgeon: Luis Bogran-Reyes, MD;  Location: [facility];  Service: Endoscopy;  Laterality: N/A;    COSMETIC SURGERY      bilateral breast lift    HIP SURGERY  2014    right total    JOINT REPLACEMENT      TOTAL REDUCTION MAMMOPLASTY      LIft    TUBAL LIGATION       OB History          6    Para   6    Term   6            AB        Living   6         SAB        IAB        Ectopic        Multiple        Live Births   6               Family History   Problem Relation Name Age of Onset    Hypothyroidism Mother      Hypertension Father buzz      Aortic aneurysm Father buzz     Stroke Father buzz     No Known Problems Sister      No Known Problems Brother      Breast cancer Maternal Aunt  40    Breast cancer Cousin Annmarie      Social History[1]    Current Outpatient Medications   Medication Sig    atorvastatin (LIPITOR) 20 MG tablet Take 1 tablet by mouth once daily    multivit-min/iron/folic/[id number] (HAIR, SKIN AND NAILS ADVANCED ORAL) Take by mouth Daily.    multivitamin (THERAGRAN) per tablet Take 1 tablet by mouth once daily.    TURMERIC ORAL Take by mouth Daily.    acetaminophen (TYLENOL) 500 MG tablet Take 500 mg by mouth every 6 (six) hours as needed for Pain.    meloxicam (MOBIC) 7.5 MG tablet Take 1 tablet by mouth once daily     No current facility-administered medications for this visit.       Review of Systems:  Review of Systems   Genitourinary:  Positive for postmenopausal bleeding. Negative for pelvic pain, vaginal bleeding, vaginal discharge, vaginal pain and vaginal odor.        OBJECTIVE:   Physical Exam:  Physical Exam  Vitals and nursing note reviewed.   Constitutional:       Appearance: She is well-developed.   Pulmonary:      Effort: Pulmonary effort is normal.   Skin:     Coloration: Skin is not pale.   Neurological:      Mental Status: She is oriented to person, place, and time.   Psychiatric:         Behavior: Behavior normal.         Thought Content: Thought content normal.         Judgment: Judgment normal.           ASSESSMENT:       ICD-10-CM ICD-9-CM    1. Post-menopausal bleeding  N95.0 627.1       2. Pre-op exam  Z01.818 V72.84              Plan:      Diann was seen today for pre-op exam.    Diagnoses and all orders for this visit:    Post-menopausal bleeding    Pre-op exam        No orders of the defined types were placed in this encounter.    15 minutes of total time was spent on the encounter which included face-to-face time and non-face-to-face time preparing to see the patient, obtaining and or reviewing   separately  obtained history, documenting clinical information in the electronic or other health record, independently interpreting results (not separately reported) and   communicating results to the patient, or care coordination (not separately reported).      - Work up today for D&C hysteroscope on 3/28/25  - Preadmit scheduled for today after this appointment  - Last pap: 2019  - CBC, Type and Screen   - CXR and EKG need determined by anesthesia  - Caprini score: 0.7; DVT prophylaxis: mechanical prophylaxis  - Consents signed for procedure, risks and benefits reviewed.    - Counseling: discussed risks of surgery including but not limited to: pain, infection, bleeding, injury to neighboring organs such as bowel, bladder, nerves, blood vessels, need for intraop consultation, and other risks of anesthesia/surgery- ICU admission, thromboembolism, heart attack, death, etc.      No follow-ups on file.    Samira Johnson               [1]   Social History  Tobacco Use    Smoking status: Former     Current packs/day: 0.00     Average packs/day: 0.3 packs/day for 2.1 years (0.5 ttl pk-yrs)     Types: Cigarettes     Start date: 1969     Quit date: 1971     Years since quittin.9     Passive exposure: Past    Smokeless tobacco: Never    Tobacco comments:     less than 1ppd x 3 years   Substance Use Topics    Alcohol use: Yes     Alcohol/week: 5.0 standard drinks of alcohol     Types: 3 Glasses of wine, 2 Drinks containing 0.5 oz of alcohol per week     Comment: one bottle of wine on weekends    Drug use: No

## 2025-03-27 NOTE — DISCHARGE INSTRUCTIONS
Information to Prepare you for your Surgery    PRE-ADMIT TESTING -  [phone]    [address]  [facility]          Your surgery has been scheduled at [facility]. We are pleased to have the opportunity to serve you. For Further Information please call [phone].    On the day of surgery please report to the Information Desk on the 1st floor.    CONTACT YOUR PHYSICIAN'S OFFICE THE DAY PRIOR TO YOUR SURGERY TO OBTAIN YOUR ARRIVAL TIME.     The evening before surgery do not eat anything after 9 p.m. ( this includes hard candy, chewing gum and mints).  You may only have GATORADE, POWERADE AND WATER  from 9 p.m. until you leave your home.   DO NOT DRINK ANY LIQUIDS ON THE WAY TO THE HOSPITAL.      Why does your anesthesiologist allow you to drink Gatorade/Powerade before surgery?  Gatorade/Powerade helps to increase your comfort before surgery and to decrease your nausea after surgery. The carbohydrates in Gatorade/Powerade help reduce your body's stress response to surgery.  If you are a diabetic-drink only water prior to surgery.    Outpatient Surgery- May allow 2 adult (18 and older) Support Persons (1 being the designated ) for all surgical/procedural patients. A breastfeeding mother will be allowed her infant and 2 adult Support Persons. No one under the age of 18 will be allowed in the building. No swapping out of visitors in the [facility].      SPECIAL MEDICATION INSTRUCTIONS: TAKE medications checked off by the Anesthesiologist on your Medication List.    Angiogram Patients: Take medications as instructed by your physician, including aspirin.     Surgery Patients:    If you take ASPIRIN - Your PHYSICIAN/SURGEON will need to inform you IF/OR when you need to stop taking aspirin prior to your surgery.     The week prior to surgery do not ot take any medications containing IBUPROFEN or NSAIDS ( Advil, Motrin, Goodys, BC, Aleve, Naproxen etc)  If you are not sure if you should take a medicine please call your surgeon's office.  Ok to take Tylenol    Do Not Wear any make-up (especially eye make-up) to surgery. Please remove any false eyelashes or eyelash extensions. If you arrive the day of surgery with makeup/eyelashes on you will be required to remove prior to surgery. (There is a risk of corneal abrasions if eye makeup/eyelash extensions are not removed)      Leave all valuables at home.   Do Not wear any jewelry or watches, including any metal in body piercings. Jewelry must be removed prior to coming to the hospital.  There is a possibility that rings that are unable to be removed may be cut off if they are on the surgical extremity.    Please remove all hair extensions, wigs, clips and any other metal accessories/ ornaments from your hair.  These items may pose a flammable/fire risk in Surgery and must be removed.    Do not shave your surgical area at least 5 days prior to your surgery. The surgical prep will be performed at the hospital according to Infection Control regulations.    Contact Lens must be removed before surgery. Either do not wear the contact lens or bring a case and solution for storage.  Please bring a container for eyeglasses or dentures as required.  Bring any paperwork your physician has provided, such as consent forms,  history and physicals, doctor's orders, etc.   Bring comfortable clothes that are loose fitting to wear upon discharge. Take into consideration the type of surgery being performed.  Maintain your diet as advised per your physician the day prior to surgery.      Adequate rest the night before surgery is advised.   Park in the Parking lot behind the hospital or in the [facility] across the street from the parking lot. Parking is complimentary.  If you will be discharged the same day as your procedure, please arrange for a responsible adult to drive you home or to accompany you if traveling by taxi.    YOU WILL NOT BE PERMITTED TO DRIVE OR TO LEAVE THE HOSPITAL ALONE AFTER SURGERY.   If you are being discharged the same day, it is strongly recommended that you arrange for someone to remain with you for the first 24 hrs following your surgery.    The Surgeon will speak to your family/visitor after your surgery regarding the outcome of your surgery and post op care.  The Surgeon may speak to you after your surgery, but there is a possibility you may not remember the details.  Please check with your family members regarding the conversation with the Surgeon.    We strongly recommend whoever is bringing you home be present for discharge instructions.  This will ensure a thorough understanding for your post op home care.    If the patient has fever, cough, or signs/symptoms of Flu or Covid please do not come in for your surgery. Contact your surgeon and your primary care physician for further instructions.           Thank you for your cooperation.  The Staff of [facility].            Bathing Instructions with Hibiclens    Shower the evening before and morning of your procedure with Chlorhexidine (Hibiclens)  do not use Chlorhexidine on your face or genitals. Do not get in your eyes.  Wash your face with water and your regular face wash/soap  Use your regular shampoo  Apply Chlorhexidine (Hibiclens) directly on your skin or on a wet washcloth and wash gently. When showering: Move away from the shower stream when applying Chlorhexidine (Hibiclens) to avoid rinsing off too soon.  Rinse thoroughly with warm water  Do not dilute Chlorhexidine (Hibiclens)   Dry off as usual, do not use any deodorant, powder, body lotions, perfume, after shave or cologne.

## 2025-03-28 ENCOUNTER — HOSPITAL ENCOUNTER (OUTPATIENT)
Dept: RADIOLOGY | Facility: HOSPITAL | Age: 70
Discharge: HOME | End: 2025-03-28
Attending: ORTHOPAEDIC SURGERY
Payer: MEDICARE

## 2025-03-28 ENCOUNTER — HOSPITAL ENCOUNTER (OUTPATIENT)
Facility: OTHER | Age: 70
Discharge: HOME | End: 2025-03-28
Attending: OBSTETRICS & GYNECOLOGY | Admitting: OBSTETRICS & GYNECOLOGY
Payer: MEDICARE

## 2025-03-28 ENCOUNTER — OFFICE VISIT (OUTPATIENT)
Dept: ORTHOPEDICS | Facility: CLINIC | Age: 70
End: 2025-03-28
Payer: MEDICARE

## 2025-03-28 ENCOUNTER — ANESTHESIA (OUTPATIENT)
Dept: SURGERY | Facility: OTHER | Age: 70
End: 2025-03-28
Payer: MEDICARE

## 2025-03-28 DIAGNOSIS — Z98.890 HISTORY OF HYSTEROSCOPY: Primary | ICD-10-CM

## 2025-03-28 DIAGNOSIS — M16.12 PRIMARY OSTEOARTHRITIS OF LEFT HIP: Primary | ICD-10-CM

## 2025-03-28 DIAGNOSIS — N95.0 PMB (POSTMENOPAUSAL BLEEDING): ICD-10-CM

## 2025-03-28 DIAGNOSIS — M25.552 LEFT HIP PAIN: ICD-10-CM

## 2025-03-28 DIAGNOSIS — M54.16 LUMBAR RADICULOPATHY: ICD-10-CM

## 2025-03-28 PROCEDURE — 99999 PR PBB SHADOW E&M-EST. PATIENT-LVL III: CPT | Mod: PBBFAC,,, | Performed by: ORTHOPAEDIC SURGERY

## 2025-03-28 PROCEDURE — 71000015 HC POSTOP RECOV 1ST HR: Performed by: OBSTETRICS & GYNECOLOGY

## 2025-03-28 PROCEDURE — 63600175 PHARM REV CODE 636 W HCPCS: Performed by: NURSE ANESTHETIST, CERTIFIED REGISTERED

## 2025-03-28 PROCEDURE — 88305 TISSUE EXAM BY PATHOLOGIST: CPT | Mod: 26,,, | Performed by: PATHOLOGY

## 2025-03-28 PROCEDURE — 37000009 HC ANESTHESIA EA ADD 15 MINS: Performed by: OBSTETRICS & GYNECOLOGY

## 2025-03-28 PROCEDURE — 71000016 HC POSTOP RECOV ADDL HR: Performed by: OBSTETRICS & GYNECOLOGY

## 2025-03-28 PROCEDURE — 63600175 PHARM REV CODE 636 W HCPCS: Performed by: ANESTHESIOLOGY

## 2025-03-28 PROCEDURE — 37000008 HC ANESTHESIA 1ST 15 MINUTES: Performed by: OBSTETRICS & GYNECOLOGY

## 2025-03-28 PROCEDURE — 25000003 PHARM REV CODE 250: Performed by: NURSE ANESTHETIST, CERTIFIED REGISTERED

## 2025-03-28 PROCEDURE — 27201423 OPTIME MED/SURG SUP & DEVICES STERILE SUPPLY: Performed by: OBSTETRICS & GYNECOLOGY

## 2025-03-28 PROCEDURE — 58558 HYSTEROSCOPY BIOPSY: CPT | Mod: ,,, | Performed by: OBSTETRICS & GYNECOLOGY

## 2025-03-28 PROCEDURE — 73502 X-RAY EXAM HIP UNI 2-3 VIEWS: CPT | Mod: 26,LT,, | Performed by: RADIOLOGY

## 2025-03-28 PROCEDURE — 71000033 HC RECOVERY, INTIAL HOUR: Performed by: OBSTETRICS & GYNECOLOGY

## 2025-03-28 PROCEDURE — 88342 IMHCHEM/IMCYTCHM 1ST ANTB: CPT | Mod: TC | Performed by: OBSTETRICS & GYNECOLOGY

## 2025-03-28 PROCEDURE — 73502 X-RAY EXAM HIP UNI 2-3 VIEWS: CPT | Mod: TC,LT

## 2025-03-28 PROCEDURE — 88341 IMHCHEM/IMCYTCHM EA ADD ANTB: CPT | Mod: 26,,, | Performed by: PATHOLOGY

## 2025-03-28 PROCEDURE — C1782 MORCELLATOR: HCPCS | Performed by: OBSTETRICS & GYNECOLOGY

## 2025-03-28 PROCEDURE — 36000706: Performed by: OBSTETRICS & GYNECOLOGY

## 2025-03-28 PROCEDURE — 36000707: Performed by: OBSTETRICS & GYNECOLOGY

## 2025-03-28 PROCEDURE — 88342 IMHCHEM/IMCYTCHM 1ST ANTB: CPT | Mod: 26,,, | Performed by: PATHOLOGY

## 2025-03-28 PROCEDURE — 63600175 PHARM REV CODE 636 W HCPCS: Mod: JZ,TB | Performed by: ANESTHESIOLOGY

## 2025-03-28 RX ORDER — KETOROLAC TROMETHAMINE 30 MG/ML
INJECTION, SOLUTION INTRAMUSCULAR; INTRAVENOUS
Status: DISCONTINUED | OUTPATIENT
Start: 2025-03-28 | End: 2025-03-28

## 2025-03-28 RX ORDER — DEXAMETHASONE SODIUM PHOSPHATE 4 MG/ML
INJECTION, SOLUTION INTRA-ARTICULAR; INTRALESIONAL; INTRAMUSCULAR; INTRAVENOUS; SOFT TISSUE
Status: DISCONTINUED | OUTPATIENT
Start: 2025-03-28 | End: 2025-03-28

## 2025-03-28 RX ORDER — FENTANYL CITRATE 50 UG/ML
INJECTION, SOLUTION INTRAMUSCULAR; INTRAVENOUS
Status: DISCONTINUED | OUTPATIENT
Start: 2025-03-28 | End: 2025-03-28

## 2025-03-28 RX ORDER — LIDOCAINE HYDROCHLORIDE 20 MG/ML
INJECTION INTRAVENOUS
Status: DISCONTINUED | OUTPATIENT
Start: 2025-03-28 | End: 2025-03-28

## 2025-03-28 RX ORDER — GLUCAGON 1 MG
1 KIT INJECTION
Status: DISCONTINUED | OUTPATIENT
Start: 2025-03-28 | End: 2025-03-28 | Stop reason: HOSPADM

## 2025-03-28 RX ORDER — MELOXICAM 7.5 MG/1
7.5 TABLET ORAL
Qty: 30 TABLET | Refills: 0 | Status: SHIPPED | OUTPATIENT
Start: 2025-03-28

## 2025-03-28 RX ORDER — ACETAMINOPHEN 500 MG
1000 TABLET ORAL EVERY 6 HOURS PRN
Status: CANCELLED | OUTPATIENT
Start: 2025-03-28

## 2025-03-28 RX ORDER — DEXTROMETHORPHAN HYDROBROMIDE, GUAIFENESIN 5; 100 MG/5ML; MG/5ML
650 LIQUID ORAL EVERY 6 HOURS
Qty: 60 TABLET | Refills: 0 | Status: SHIPPED | OUTPATIENT
Start: 2025-03-28

## 2025-03-28 RX ORDER — HYDROMORPHONE HYDROCHLORIDE 2 MG/ML
0.4 INJECTION, SOLUTION INTRAMUSCULAR; INTRAVENOUS; SUBCUTANEOUS EVERY 5 MIN PRN
Status: DISCONTINUED | OUTPATIENT
Start: 2025-03-28 | End: 2025-03-28 | Stop reason: HOSPADM

## 2025-03-28 RX ORDER — PROCHLORPERAZINE EDISYLATE 5 MG/ML
5 INJECTION INTRAMUSCULAR; INTRAVENOUS EVERY 30 MIN PRN
Status: DISCONTINUED | OUTPATIENT
Start: 2025-03-28 | End: 2025-03-28 | Stop reason: HOSPADM

## 2025-03-28 RX ORDER — PROPOFOL 10 MG/ML
VIAL (ML) INTRAVENOUS
Status: DISCONTINUED | OUTPATIENT
Start: 2025-03-28 | End: 2025-03-28

## 2025-03-28 RX ORDER — ONDANSETRON HYDROCHLORIDE 2 MG/ML
4 INJECTION, SOLUTION INTRAVENOUS EVERY 4 HOURS PRN
Status: CANCELLED | OUTPATIENT
Start: 2025-03-28

## 2025-03-28 RX ORDER — DOCUSATE SODIUM 100 MG/1
100 CAPSULE, LIQUID FILLED ORAL 2 TIMES DAILY
Qty: 60 CAPSULE | Refills: 0 | Status: SHIPPED | OUTPATIENT
Start: 2025-03-28

## 2025-03-28 RX ORDER — ONDANSETRON HYDROCHLORIDE 2 MG/ML
INJECTION, SOLUTION INTRAVENOUS
Status: DISCONTINUED | OUTPATIENT
Start: 2025-03-28 | End: 2025-03-28

## 2025-03-28 RX ORDER — ONDANSETRON HYDROCHLORIDE 2 MG/ML
4 INJECTION, SOLUTION INTRAVENOUS DAILY PRN
Status: DISCONTINUED | OUTPATIENT
Start: 2025-03-28 | End: 2025-03-28 | Stop reason: HOSPADM

## 2025-03-28 RX ORDER — OXYCODONE HYDROCHLORIDE 5 MG/1
5 TABLET ORAL
Status: DISCONTINUED | OUTPATIENT
Start: 2025-03-28 | End: 2025-03-28 | Stop reason: HOSPADM

## 2025-03-28 RX ORDER — KETOROLAC TROMETHAMINE 30 MG/ML
15 INJECTION, SOLUTION INTRAMUSCULAR; INTRAVENOUS EVERY 6 HOURS PRN
Status: CANCELLED | OUTPATIENT
Start: 2025-03-28 | End: 2025-03-31

## 2025-03-28 RX ORDER — SODIUM CHLORIDE 9 MG/ML
INJECTION, SOLUTION INTRAVENOUS CONTINUOUS
Status: DISCONTINUED | OUTPATIENT
Start: 2025-03-28 | End: 2025-03-28 | Stop reason: HOSPADM

## 2025-03-28 RX ORDER — ACETAMINOPHEN 10 MG/ML
1000 INJECTION, SOLUTION INTRAVENOUS ONCE
Status: COMPLETED | OUTPATIENT
Start: 2025-03-28 | End: 2025-03-28

## 2025-03-28 RX ORDER — IBUPROFEN 600 MG/1
600 TABLET ORAL EVERY 6 HOURS
Qty: 60 TABLET | Refills: 0 | Status: SHIPPED | OUTPATIENT
Start: 2025-03-28

## 2025-03-28 RX ORDER — SODIUM CHLORIDE 0.9 % (FLUSH) 0.9 %
2 SYRINGE (ML) INJECTION ONCE
Status: DISCONTINUED | OUTPATIENT
Start: 2025-03-28 | End: 2025-03-28 | Stop reason: HOSPADM

## 2025-03-28 RX ADMIN — FENTANYL CITRATE 25 MCG: 50 INJECTION, SOLUTION INTRAMUSCULAR; INTRAVENOUS at 02:03

## 2025-03-28 RX ADMIN — ACETAMINOPHEN 1000 MG: 10 INJECTION, SOLUTION INTRAVENOUS at 03:03

## 2025-03-28 RX ADMIN — KETOROLAC TROMETHAMINE 15 MG: 30 INJECTION, SOLUTION INTRAMUSCULAR; INTRAVENOUS at 02:03

## 2025-03-28 RX ADMIN — ONDANSETRON HYDROCHLORIDE 4 MG: 2 INJECTION INTRAMUSCULAR; INTRAVENOUS at 02:03

## 2025-03-28 RX ADMIN — PROPOFOL 200 MG: 10 INJECTION, EMULSION INTRAVENOUS at 02:03

## 2025-03-28 RX ADMIN — SODIUM CHLORIDE: 0.9 INJECTION, SOLUTION INTRAVENOUS at 02:03

## 2025-03-28 RX ADMIN — LIDOCAINE HYDROCHLORIDE 100 MG: 20 INJECTION, SOLUTION INTRAVENOUS at 02:03

## 2025-03-28 RX ADMIN — PROPOFOL 50 MG: 10 INJECTION, EMULSION INTRAVENOUS at 02:03

## 2025-03-28 RX ADMIN — HYDROMORPHONE HYDROCHLORIDE 0.4 MG: 2 INJECTION, SOLUTION INTRAMUSCULAR; INTRAVENOUS; SUBCUTANEOUS at 03:03

## 2025-03-28 RX ADMIN — DEXAMETHASONE SODIUM PHOSPHATE 8 MG: 4 INJECTION, SOLUTION INTRAMUSCULAR; INTRAVENOUS at 02:03

## 2025-03-28 NOTE — ANESTHESIA PROCEDURE NOTES
Intubation    Date/Time: 3/28/2025 2:31 PM    Performed by: Nadine Bui  Authorized by: Will Ewing MD    Intubation:     Induction:  Intravenous    Intubated:  Postinduction    Mask Ventilation:  Easy mask    Attempts:  1    Attempted By:  CRNA    Difficult Airway Encountered?: No      Complications:  None    Airway Device:  Supraglottic airway/LMA    Airway Device Size:  4.0    Placement Verified By:  Capnometry    Complicating Factors:  None    Findings Post-Intubation:  BS equal bilateral and atraumatic/condition of teeth unchanged

## 2025-03-28 NOTE — OP NOTE
[facility] Surgery ([facility]  Surgery Department  Operative Note    SUMMARY     Date of Procedure: 3/28/2025     Procedure: Procedure(s) (LRB):  HYSTEROSCOPY, WITH DILATION AND CURETTAGE OF UTERUS (N/A)     Surgeons and Role:     * Samira Johnson MD - Primary    Assisting Surgeon: None    Pre-Operative Diagnosis: PMB (postmenopausal bleeding) [N95.0]    Post-Operative Diagnosis: Post-Op Diagnosis Codes:     * PMB (postmenopausal bleeding) [N95.0]    Anesthesia: General    Operative Findings (including complications, if any): Two intrauterine polyps    Estimated Blood Loss (EBL): * No values recorded between 3/28/2025  2:23 PM and 3/28/2025  3:08 PM *           Implants: * No implants in log *    Specimens:   Specimen (24h ago, onward)       Start     Ordered    03/28/25 2506  Specimen to Pathology Gynecology and Obstetrics  RELEASE UPON ORDERING        References:    Click here for ordering Quick Tip   Question:  Release to patient  Answer:  Immediate    03/28/25 1459                   ID Type Source Tests Collected by Time Destination   1 : endometrial curettage Tissue Uterus SPECIMEN TO PATHOLOGY Samira Johnson MD 3/28/2025 [id number]               Condition: Good    Disposition: PACU - hemodynamically stable.    Procedure in detail:  The patient was taken to the operating room where general anesthesia was found to be adequate.  She was placed in dorsal lithotomy position.  She was prepped and draped in the normal sterile fashion.  The anterior lip of her cervix was grasped with a single-tooth tenaculum.  The uterus was sounded to 7 cm.  The cervix was then dilated up to the 8. Hegar.  The hysteroscope was inserted into the uterine cavity.  Two uterine polyps were visible within the cavity.  Bilateral ostia were also visible.  No defects were noted in the uterine cavity.  The MyoSure reach device was then used to remove both uterine polyps.  The procedure was then completed this time.  The single-tooth  tenaculum was removed from the anterior cervix.  The patient was transferred to recovery in stable condition.

## 2025-03-28 NOTE — BRIEF OP NOTE
[facility] - Surgery ([place])  Brief Operative Note    Surgery Date: 3/28/2025     Surgeons and Role:     * Samira Johnson MD - Primary    Assisting Surgeon: None    Pre-op Diagnosis:  PMB (postmenopausal bleeding) [N95.0]    Post-op Diagnosis:  Post-Op Diagnosis Codes:     * PMB (postmenopausal bleeding) [N95.0]    Procedure(s) (LRB):  HYSTEROSCOPY, WITH DILATION AND CURETTAGE OF UTERUS (N/A)    Anesthesia: General    Operative Findings: two intrauterine polyps    Estimated Blood Loss: * No values recorded between 3/28/2025  2:23 PM and 3/28/2025  3:08 PM *         Specimens:   Specimen (24h ago, onward)       Start     Ordered    03/28/25 1456  Specimen to Pathology Gynecology and Obstetrics  RELEASE UPON ORDERING        References:    Click here for ordering Quick Tip   Question:  Release to patient  Answer:  Immediate    03/28/25 1453                    ID Type Source Tests Collected by Time Destination   1 : endometrial curettage Tissue Uterus SPECIMEN TO PATHOLOGY Samira Johnson MD 3/28/2025 [id number]            Discharge Note    OUTCOME: Patient tolerated treatment/procedure well without complication and is now ready for discharge.    DISPOSITION: Home or Self Care    FINAL DIAGNOSIS:  History of hysteroscopy    FOLLOWUP: In clinic    DISCHARGE INSTRUCTIONS:    Discharge Procedure Orders   Diet general     Other restrictions (specify):   Order Comments: DRIVING:  No driving while on narcotics. Driving may be resumed initially with a competent passenger one to two weeks after surgery if no longer taking narcotics.     EXERCISE:  For six weeks your exercise should be limited to walking. You may walk as far as you wish, as long as you increase your level of exertion gradually and avoid slippery surfaces. You may climb stairs as needed to get around, but should not use stair climbing for exercise.     Call MD for:  temperature >100.4     Call MD for:  persistent nausea and vomiting     Call MD for:   severe uncontrolled pain     Call MD for:  difficulty breathing, headache or visual disturbances     Call MD for:  redness, tenderness, or signs of infection (pain, swelling, redness, odor or green/yellow discharge around incision site)     Call MD for:  hives     Call MD for:   Order Comments: inability to void,urine is ketchup colored or you have large clots, vaginal bleeding is heavier than a period.    VAGINAL DISCHARGE: You may develop a vaginal discharge and intermittent vaginal spotting after surgery and up to 6 weeks postoperatively.  The discharge may have an odor and may change in color but it is normal.  This is due to dissolving stiches.  Contact your surgical team if you develop vaginal or vulvar irritation along with a discharge.  Also contact your surgical team if you have vaginal discharge that smells like urine or stool.    PAIN MEDICATIONS:     Take your pain medications as instructed. It is best to take pain medications before your pain becomes severe. This will allow you to take less medication yet have better pain relief. For the first 2 or 3 days it may be helpful to take your pain medications on a regular schedule (e.g. every 4 to 6 hours). This will help you to keep your pain under better control. You should then begin to take fewer medications each day until you no longer need them. Do not take pain medication on an empty stomach. This may lead to nausea and vomiting.    CONSTIPATION REMEDIES: Patients are often constipated after surgery or with use of oral narcotic medicine. You should continue to take the stool softener, Senokot-S during the next six weeks, and consume adequate amounts of water.  If you have not had a bowel movement for 3 days after dismissal, or are uncomfortable and unable to pass stool, please try one or all of the following measures:  1.  Milk of Magnesia - 30 cc by mouth every 12 hours   2.  Dulcolax suppository - One suppository per rectum every 4-6 hours   3.   Metamucil, Fibercon or other bulk former - use as directed  4.  Fleets Enema  5.  Prunes or Prune juice    If you continue to have constipation after trying the above remedies, you should contact your surgical team using the contact information listed above     No dressing needed     Activity as tolerated   Order Comments: Return to normal activity slowly as you feel able.  For 6 weeks your exercise should be limited to walking.  You may walk as far as you wish, as long as you increase your level of exertion gradually and avoid slippery surfaces.     Shower on day dressing removed (No bath)   Order Comments: You may shower at any time but should avoid immersing in water for at least 2 weeks after surgery or until the wound is completely healed.  If given, please shower with Hibaclens soap until bottle is completely finish

## 2025-03-28 NOTE — TELEPHONE ENCOUNTER
Refill Routing Note   Medication(s) are not appropriate for processing by [facility] for the following reason(s):        Outside of protocol    [facility] action(s):  Route               Appointments  past 12m or future 3m with PCP    Date Provider   Last Visit   11/6/2023 Virginia Greco MD   Next Visit   Visit date not found Virginia Greco MD   ED visits in past 90 days: 0        Note composed:10:43 AM 03/28/2025

## 2025-03-28 NOTE — TRANSFER OF CARE
"Anesthesia Transfer of Care Note    Patient: Diann Wasserman    Procedure(s) Performed: Procedure(s) (LRB):  HYSTEROSCOPY, WITH DILATION AND CURETTAGE OF UTERUS (N/A)    Patient location: PACU    Anesthesia Type: general    Transport from OR: Transported from OR on 2-3 L/min O2 by NC with adequate spontaneous ventilation    Post pain: adequate analgesia    Post assessment: no apparent anesthetic complications and tolerated procedure well    Post vital signs: stable    Level of consciousness: awake    Nausea/Vomiting: no nausea/vomiting    Complications: none    Transfer of care protocol was followed      Last vitals: Visit Vitals  /70 (BP Location: Left arm, Patient Position: Lying)   Pulse 66   Temp 36.7 °C (98 °F) (Oral)   Resp 16   Ht 5' 2" (1.575 m)   Wt 63.5 kg (140 lb)   SpO2 99%   Breastfeeding No   BMI 25.61 kg/m²     "

## 2025-03-28 NOTE — PROGRESS NOTES
HPI: 69-year-old female complains of pain in the left hip.  The pain has been going on for quite some time.  She has a history of right total hip replacement in 2014 for osteoarthritis.  She has now been going to physical therapy with some improvement in her pain.  The pain is largely in the groin, is exacerbated by activity and relieved by rest.  She does also complain of occasional pain in the posterior aspect of the hip.  She states that her physical therapist worked on some exercises with her and she has now developed a home exercise program.    03/28/2025:  Patient wanted originally to wait until June to proceed with total hip arthroplasty, however she has gotten to the point where activities of daily living have become difficult as she can not ambulate normally.  She has constant pain in the left groin which is exacerbated by increased activity and minimally relieved by rest.      Past Medical History:   Diagnosis Date    Arthritis     Cataract     Iris nevus, left     Right hip pain        Current Outpatient Medications on File Prior to Visit   Medication Sig Dispense Refill    atorvastatin (LIPITOR) 20 MG tablet Take 1 tablet (20 mg total) by mouth once daily. 90 tablet 3    gabapentin (NEURONTIN) 100 MG capsule 1-3 tablets nightly as needed (Patient not taking: Reported on 10/15/2024) 90 capsule 1    meloxicam (MOBIC) 7.5 MG tablet Take 1 tablet by mouth once daily 30 tablet 5    multivit-min/iron/folic/mnj555 (HAIR, SKIN AND NAILS ADVANCED ORAL) Take by mouth Daily.      multivitamin (THERAGRAN) per tablet Take 1 tablet by mouth once daily.      TURMERIC ORAL Take by mouth Daily.       No current facility-administered medications on file prior to visit.       Social History     Socioeconomic History    Marital status:     Years of education: 10   Tobacco Use    Smoking status: Former     Current packs/day: 0.00     Average packs/day: 0.3 packs/day for 2.1 years (0.5 ttl pk-yrs)     Types: Cigarettes      Start date: 1969     Quit date: 1971     Years since quittin.6    Smokeless tobacco: Former    Tobacco comments:     less than 1ppd x 3 years   Substance and Sexual Activity    Alcohol use: Yes     Alcohol/week: 5.0 standard drinks of alcohol     Types: 3 Glasses of wine, 2 Drinks containing 0.5 oz of alcohol per week     Comment: one bottle of wine on weekends    Drug use: No    Sexual activity: Yes     Partners: Male     Birth control/protection: See Surgical Hx     Social Drivers of Health     Financial Resource Strain: Low Risk  (2024)    Overall Financial Resource Strain (CARDIA)     Difficulty of Paying Living Expenses: Not hard at all   Food Insecurity: No Food Insecurity (2024)    Hunger Vital Sign     Worried About Running Out of Food in the Last Year: Never true     Ran Out of Food in the Last Year: Never true   Transportation Needs: No Transportation Needs (2023)    PRAPARE - Transportation     Lack of Transportation (Medical): No     Lack of Transportation (Non-Medical): No   Physical Activity: Unknown (2024)    Exercise Vital Sign     Days of Exercise per Week: Patient declined     Minutes of Exercise per Session: 60 min   Stress: No Stress Concern Present (2024)    [employer] [place] [employer] - Occupational Stress Questionnaire     Feeling of Stress : Only a little   Housing Stability: Unknown (2024)    Housing Stability Vital Sign     Unable to Pay for Housing in the Last Year: No         ROS:  Patient denies constitutional symptoms, cardiac symptoms, respiratory symptoms, GI symptoms, Urinary symptoms, skin issues, allergic issues  The remainder of the musculoskeletal ROS is included in the HPI.    PE:    AA&O x 4.  NAD.  Normal mood and affect.  HEENT:  NCAT, sclera nonicteric  Lungs:  Respirations are equal and unlabored.  Abd: Non distended  :  No evidence of incontinence  CV:  2+ bilateral upper and lower extremity pulses.  Skin:   Intact throughout.    MS:  Significantly antalgic gait.  Left hip with 110° flexion.  30° external rotation and 10° internal rotation.  Very stiff.  Extreme pain with log roll and with all extremes of motion.  Neurovascular intact grossly.    Rads:  Reviewed and interpreted today by me.  Patient has osteoarthritis of the left hip, [place] 3.    A/P:  69-year-old female with moderate to severe arthritis of the left hip.  She has done extensive physical therapy and home exercise programs and at this point her left hip pain and decrease in function is affecting her ADLs and she would like to proceed with total hip arthroplasty.  I am booking her for 04/15/2025 for left total hip arthroplasty, anterior approach.  We will plan for same-day surgery if possible, with the possibility of overnight stay if needed.  We will get her plugged in for the surgical home pathway.  The risks and benefits have been discussed with the patient at great length.

## 2025-03-28 NOTE — PLAN OF CARE
Diann Wasserman has met all discharge criteria from Phase II. Vital Signs are stable, ambulating  without difficulty. Discharge instructions given, patient verbalized understanding. Discharged from facility via wheelchair in stable condition.

## 2025-03-28 NOTE — ANESTHESIA POSTPROCEDURE EVALUATION
Anesthesia Post Evaluation    Patient: Diann Wasserman    Procedure(s) Performed: Procedure(s) (LRB):  HYSTEROSCOPY, WITH DILATION AND CURETTAGE OF UTERUS (N/A)    Final Anesthesia Type: general      Patient location during evaluation: PACU  Patient participation: Yes- Able to Participate  Level of consciousness: awake and alert  Post-procedure vital signs: reviewed and stable  Pain management: adequate  Airway patency: patent    PONV status at discharge: No PONV  Anesthetic complications: no      Cardiovascular status: blood pressure returned to baseline  Respiratory status: unassisted  Hydration status: euvolemic  Follow-up not needed.          Vitals Value Taken Time   /75 03/28/25 15:31   Temp 36.3 °C (97.3 °F) 03/28/25 15:30   Pulse 73 03/28/25 15:45   Resp 18 03/28/25 15:30   SpO2 95 % 03/28/25 15:45   Vitals shown include unfiled device data.      Event Time   Out of Recovery 15:47:56         Pain/Vinh Score: Pain Rating Prior to Med Admin: 7 (3/28/2025  3:21 PM)  Pain Rating Post Med Admin: 3 (3/28/2025  3:30 PM)  Vinh Score: 10 (3/28/2025  3:50 PM)

## 2025-03-28 NOTE — TELEPHONE ENCOUNTER
No care due was identified.  [facility] Embedded Care Due Messages. Reference number: [id number].   3/28/2025 7:00:51 AM CDT

## 2025-03-28 NOTE — DISCHARGE SUMMARY
[facility]  Brief Op Note/Discharge Note  Short Stay    Admit Date: 3/28/2025    Discharge Date: 03/28/2025    Attending Physician: Samira Johnson MD     Surgery Date: 3/28/2025     Surgeons and Role:     * Samira Johnson MD - Primary    Assisting Surgeon: None    Pre-op Diagnosis:  PMB (postmenopausal bleeding) [N95.0]    Post-op Diagnosis:  Post-Op Diagnosis Codes:     * PMB (postmenopausal bleeding) [N95.0]    Procedure(s) (LRB):  HYSTEROSCOPY, WITH DILATION AND CURETTAGE OF UTERUS (N/A)    Anesthesia: General    Findings/Key Components: See Op Note for full details.    Estimated Blood Loss: * No values recorded between 3/28/2025  2:23 PM and 3/28/2025  3:08 PM *         Specimens:   Specimen (24h ago, onward)       Start     Ordered    03/28/25 4479  Specimen to Pathology Gynecology and Obstetrics  RELEASE UPON ORDERING        References:    Click here for ordering Quick Tip   Question:  Release to patient  Answer:  Immediate    03/28/25 0340                    Discharge Provider: Michelle Soriano    Diagnoses:  Active Hospital Problems    Diagnosis  POA    *History of hysteroscopy D&C [Z98.890]  Not Applicable      Resolved Hospital Problems   No resolved problems to display.       Discharged Condition: good    Hospital Course:   Patient was admitted for outpatient procedure as above, and tolerated the procedure well with no complications. Please see operative report for further details. Following the procedure, the patient was awakened from anesthesia and transferred to the recovery area in stable condition. She was discharged to home once ambulating, voiding, tolerating PO intake, and pain was well-controlled. Patient was given routine post-op instructions and prescriptions for pain medication to take as needed. Patient instructed to follow up with Dr. Johnson in 6 weeks.    Final Diagnoses: Same as principal problem.    Disposition: Home or Self Care    Follow up/Patient  Instructions:    Medications:  Reconciled Home Medications:      Medication List        START taking these medications      docusate sodium 100 MG capsule  Commonly known as: COLACE  Take 1 capsule (100 mg total) by mouth 2 (two) times daily.     ibuprofen 600 MG tablet  Commonly known as: ADVIL,MOTRIN  Take 1 tablet (600 mg total) by mouth every 6 (six) hours. Alternate between ibuprofen and tylenol every 3 hours. For example: @0800: ibuprofen 600mg @1100: tylenol 650mg @1400: ibuprofen 600mg @1700: tylenol 650 mg @2000: ibuprofen 600mg            CHANGE how you take these medications      * acetaminophen 500 MG tablet  Commonly known as: TYLENOL  Take 500 mg by mouth every 6 (six) hours as needed for Pain.  What changed: Another medication with the same name was added. Make sure you understand how and when to take each.     * acetaminophen 650 MG Tbsr  Commonly known as: TYLENOL  Take 1 tablet (650 mg total) by mouth every 6 (six) hours. Alternate between ibuprofen and tylenol every 3 hours. For example: @0800: ibuprofen 600mg @1100: tylenol 650mg @1400: ibuprofen 600mg @1700: tylenol 650 mg @2000: ibuprofen 600mg  What changed: You were already taking a medication with the same name, and this prescription was added. Make sure you understand how and when to take each.           * This list has 2 medication(s) that are the same as other medications prescribed for you. Read the directions carefully, and ask your doctor or other care provider to review them with you.                CONTINUE taking these medications      atorvastatin 20 MG tablet  Commonly known as: LIPITOR  Take 1 tablet by mouth once daily     HAIR, SKIN AND NAILS ADVANCED ORAL  Take by mouth Daily.     meloxicam 7.5 MG tablet  Commonly known as: MOBIC  Take 1 tablet by mouth once daily     multivitamin per tablet  Commonly known as: THERAGRAN  Take 1 tablet by mouth once daily.     TURMERIC ORAL  Take by mouth Daily.            Discharge Procedure  Orders   Diet general     Other restrictions (specify):   Order Comments: DRIVING:  No driving while on narcotics. Driving may be resumed initially with a competent passenger one to two weeks after surgery if no longer taking narcotics.     EXERCISE:  For six weeks your exercise should be limited to walking. You may walk as far as you wish, as long as you increase your level of exertion gradually and avoid slippery surfaces. You may climb stairs as needed to get around, but should not use stair climbing for exercise.     Call MD for:  temperature >100.4     Call MD for:  persistent nausea and vomiting     Call MD for:  severe uncontrolled pain     Call MD for:  difficulty breathing, headache or visual disturbances     Call MD for:  redness, tenderness, or signs of infection (pain, swelling, redness, odor or green/yellow discharge around incision site)     Call MD for:  hives     Call MD for:   Order Comments: inability to void,urine is ketchup colored or you have large clots, vaginal bleeding is heavier than a period.    VAGINAL DISCHARGE: You may develop a vaginal discharge and intermittent vaginal spotting after surgery and up to 6 weeks postoperatively.  The discharge may have an odor and may change in color but it is normal.  This is due to dissolving stiches.  Contact your surgical team if you develop vaginal or vulvar irritation along with a discharge.  Also contact your surgical team if you have vaginal discharge that smells like urine or stool.    PAIN MEDICATIONS:     Take your pain medications as instructed. It is best to take pain medications before your pain becomes severe. This will allow you to take less medication yet have better pain relief. For the first 2 or 3 days it may be helpful to take your pain medications on a regular schedule (e.g. every 4 to 6 hours). This will help you to keep your pain under better control. You should then begin to take fewer medications each day until you no longer need  them. Do not take pain medication on an empty stomach. This may lead to nausea and vomiting.    CONSTIPATION REMEDIES: Patients are often constipated after surgery or with use of oral narcotic medicine. You should continue to take the stool softener, Senokot-S during the next six weeks, and consume adequate amounts of water.  If you have not had a bowel movement for 3 days after dismissal, or are uncomfortable and unable to pass stool, please try one or all of the following measures:  1.  Milk of Magnesia - 30 cc by mouth every 12 hours   2.  Dulcolax suppository - One suppository per rectum every 4-6 hours   3.  Metamucil, Fibercon or other bulk former - use as directed  4.  Fleets Enema  5.  Prunes or Prune juice    If you continue to have constipation after trying the above remedies, you should contact your surgical team using the contact information listed above     No dressing needed     Activity as tolerated   Order Comments: Return to normal activity slowly as you feel able.  For 6 weeks your exercise should be limited to walking.  You may walk as far as you wish, as long as you increase your level of exertion gradually and avoid slippery surfaces.     Shower on day dressing removed (No bath)   Order Comments: You may shower at any time but should avoid immersing in water for at least 2 weeks after surgery or until the wound is completely healed.  If given, please shower with Hibaclens soap until bottle is completely finish      Follow-up Information       Samira Johnson MD Follow up in 6 week(s).    Specialty: Obstetrics and Gynecology  Why: post-op follow up  Contact information:  [address] [facility] [id number] [phone]                               Michelle Soriano MD PGY2  Obstetrics and Gynecology

## 2025-03-28 NOTE — DISCHARGE INSTRUCTIONS
Home Care Instruction D&C Hysteroscopy             ACTIVITY LEVEL:  If you received sedation and/or an anesthetic, you may feel sleepy for several hours. Rest until you feel more  awake. Gradually resume your normal activities.    DIET:  At home, continue with liquids. If there is no nausea, you may eat a soft diet and gradually resume a regular diet.    BATHING:  You may shower  as desired in one day.  You should avoid tub baths, hot tubs and swimming pools until seen by your physician for a post-op follow up.    CARE:  You can expect watery or bloody vaginal discharge for several days. Do not use tampons, please only use pads. Sexual activity is restricted as advised by your doctor.    MEDICATIONS:  You will receive instructions for any pain and/or antibiotic prescriptions. Pain medication should be taken only if needed and as directed. Antibiotics, if ordered, should be taken as directed until the entire prescription is completed.      WHEN TO CALL THE DOCTOR:   For any heavy vaginal bleeding   Fever over 101°F (38.4°C)   Any lower abdominal pain not relieved by the pain mediation    FOR EMERGENCIES:  If any unusual problems or difficulties occur, contact Dr. Johnson or the GYN resident at [phone] (page ) or the Clinic office, [phone].

## 2025-03-28 NOTE — INTERVAL H&P NOTE
The patient has been examined and the H&P has been reviewed:    I concur with the findings and no changes have occurred since H&P was written.    Surgery risks, benefits and alternative options discussed and understood by patient/family.          Active Hospital Problems    Diagnosis  POA    *History of hysteroscopy D&C [Z98.890]  Not Applicable      Resolved Hospital Problems   No resolved problems to display.

## 2025-03-28 NOTE — ANESTHESIA PREPROCEDURE EVALUATION
03/28/2025  Diann Wasserman is a 69 y.o., female.      Pre-op Assessment    I have reviewed the Patient Summary Reports.     I have reviewed the Nursing Notes. I have reviewed the NPO Status.   I have reviewed the Medications.     Review of Systems  Anesthesia Hx:  No problems with previous Anesthesia                Social:  Non-Smoker       Cardiovascular:  Exercise tolerance: good                                               Physical Exam  General: Well nourished, Alert and Cooperative    Airway:  Mallampati: III / II  Mouth Opening: Normal  TM Distance: Normal  Tongue: Normal  Neck ROM: Normal ROM    Dental:  Intact    Anesthesia Plan  Type of Anesthesia, risks & benefits discussed:    Anesthesia Type: Gen Supraglottic Airway  Intra-op Monitoring Plan: Standard ASA Monitors  Induction:  IV  Informed Consent: Informed consent signed with the Patient and all parties understand the risks and agree with anesthesia plan.  All questions answered.   ASA Score: 1    Ready For Surgery From Anesthesia Perspective.   .

## 2025-03-31 ENCOUNTER — OFFICE VISIT (OUTPATIENT)
Dept: INTERNAL MEDICINE | Facility: CLINIC | Age: 70
End: 2025-03-31
Payer: MEDICARE

## 2025-03-31 ENCOUNTER — HOSPITAL ENCOUNTER (OUTPATIENT)
Dept: RADIOLOGY | Facility: HOSPITAL | Age: 70
Discharge: HOME | End: 2025-03-31
Attending: INTERNAL MEDICINE
Payer: MEDICARE

## 2025-03-31 VITALS
OXYGEN SATURATION: 100 % | RESPIRATION RATE: 18 BRPM | HEIGHT: 62 IN | BODY MASS INDEX: 25.76 KG/M2 | HEART RATE: 70 BPM | WEIGHT: 140 LBS | TEMPERATURE: 98 F | DIASTOLIC BLOOD PRESSURE: 64 MMHG | SYSTOLIC BLOOD PRESSURE: 138 MMHG

## 2025-03-31 VITALS
HEART RATE: 66 BPM | WEIGHT: 142 LBS | BODY MASS INDEX: 26.13 KG/M2 | HEIGHT: 62 IN | DIASTOLIC BLOOD PRESSURE: 77 MMHG | OXYGEN SATURATION: 99 % | SYSTOLIC BLOOD PRESSURE: 123 MMHG

## 2025-03-31 DIAGNOSIS — M54.6 ACUTE MIDLINE THORACIC BACK PAIN: ICD-10-CM

## 2025-03-31 DIAGNOSIS — M54.6 ACUTE MIDLINE THORACIC BACK PAIN: Primary | ICD-10-CM

## 2025-03-31 PROCEDURE — 72070 X-RAY EXAM THORAC SPINE 2VWS: CPT | Mod: TC

## 2025-03-31 PROCEDURE — 99999 PR PBB SHADOW E&M-EST. PATIENT-LVL III: CPT | Mod: PBBFAC,,, | Performed by: INTERNAL MEDICINE

## 2025-03-31 PROCEDURE — 72070 X-RAY EXAM THORAC SPINE 2VWS: CPT | Mod: 26,,, | Performed by: RADIOLOGY

## 2025-03-31 PROCEDURE — 3288F FALL RISK ASSESSMENT DOCD: CPT | Mod: CPTII,S$GLB,, | Performed by: INTERNAL MEDICINE

## 2025-03-31 PROCEDURE — 1125F AMNT PAIN NOTED PAIN PRSNT: CPT | Mod: CPTII,S$GLB,, | Performed by: INTERNAL MEDICINE

## 2025-03-31 PROCEDURE — 71046 X-RAY EXAM CHEST 2 VIEWS: CPT | Mod: TC

## 2025-03-31 PROCEDURE — 3074F SYST BP LT 130 MM HG: CPT | Mod: CPTII,S$GLB,, | Performed by: INTERNAL MEDICINE

## 2025-03-31 PROCEDURE — 3008F BODY MASS INDEX DOCD: CPT | Mod: CPTII,S$GLB,, | Performed by: INTERNAL MEDICINE

## 2025-03-31 PROCEDURE — 1101F PT FALLS ASSESS-DOCD LE1/YR: CPT | Mod: CPTII,S$GLB,, | Performed by: INTERNAL MEDICINE

## 2025-03-31 PROCEDURE — 99214 OFFICE O/P EST MOD 30 MIN: CPT | Mod: S$GLB,,, | Performed by: INTERNAL MEDICINE

## 2025-03-31 PROCEDURE — 3078F DIAST BP <80 MM HG: CPT | Mod: CPTII,S$GLB,, | Performed by: INTERNAL MEDICINE

## 2025-03-31 PROCEDURE — 71046 X-RAY EXAM CHEST 2 VIEWS: CPT | Mod: 26,,, | Performed by: RADIOLOGY

## 2025-03-31 RX ORDER — TIZANIDINE 2 MG/1
2 TABLET ORAL NIGHTLY PRN
Qty: 10 TABLET | Refills: 0 | Status: SHIPPED | OUTPATIENT
Start: 2025-03-31 | End: 2025-04-10

## 2025-03-31 NOTE — PROGRESS NOTES
Subjective:       Patient ID: Diann Wasserman is a 69 y.o. female.    Chief Complaint: Back Pain    Presents for evaluation of midline to left thoracic back pain.  She states the pain started about 1 week ago after she had hysteroscopy.  She states the pain is located in the midline to left upper back and radiates into her left chest wall.  Worse with certain movements.  Is about a 6/10 pain.    Has been taking ibuprofen which was prescribed to her for the hysteroscopy which has been helping the pain.  She denies any shortness of breath, palpitations.  No dizziness.    Back Pain  Pertinent negatives include no abdominal pain, chest pain, dysuria or headaches.     Review of Systems   Constitutional:  Negative for activity change, appetite change and chills.   HENT:  Negative for ear pain, sinus pressure/congestion and sneezing.    Respiratory:  Negative for cough and shortness of breath.    Cardiovascular:  Negative for chest pain, palpitations and leg swelling.   Gastrointestinal:  Negative for abdominal distention, abdominal pain, constipation, diarrhea, nausea and vomiting.   Genitourinary:  Negative for dysuria and hematuria.   Musculoskeletal:  Positive for back pain. Negative for arthralgias and myalgias.   Neurological:  Negative for dizziness and headaches.   Psychiatric/Behavioral:  Negative for agitation. The patient is not nervous/anxious.            Past Medical History:   Diagnosis Date    Arthritis     Cataract     Iris nevus, left     Right hip pain      Past Surgical History:   Procedure Laterality Date    BREAST SURGERY Bilateral     breast lift    CHOLECYSTECTOMY  2012    COLONOSCOPY N/A 04/06/2016    Procedure: COLONOSCOPY;  Surgeon: Luis Bogran-Reyes, MD;  Location: [facility];  Service: Endoscopy;  Laterality: N/A;    COSMETIC SURGERY  1985    bilateral breast lift    HIP SURGERY  11/2014    right total    HYSTEROSCOPY WITH DILATION AND CURETTAGE OF UTERUS N/A 3/28/2025    Procedure:  HYSTEROSCOPY, WITH DILATION AND CURETTAGE OF UTERUS;  Surgeon: Samira Johnson MD;  Location: [facility];  Service: OB/GYN;  Laterality: N/A;    JOINT REPLACEMENT      TOTAL REDUCTION MAMMOPLASTY      LIft    TUBAL LIGATION        Problem List[1]     Objective:      Physical Exam  Constitutional:       Appearance: Normal appearance.   HENT:      Head: Normocephalic.   Cardiovascular:      Rate and Rhythm: Normal rate and regular rhythm.      Pulses: Normal pulses.      Heart sounds: Normal heart sounds.   Pulmonary:      Effort: Pulmonary effort is normal.      Breath sounds: Normal breath sounds.   Abdominal:      General: Abdomen is flat. Bowel sounds are normal.      Palpations: Abdomen is soft.   Musculoskeletal:         General: Normal range of motion.      Cervical back: Normal range of motion and neck supple.   Skin:     General: Skin is warm and dry.   Neurological:      General: No focal deficit present.      Mental Status: She is alert and oriented to person, place, and time.   Psychiatric:         Mood and Affect: Mood normal.         Assessment:       Problem List Items Addressed This Visit    None  Visit Diagnoses         Acute midline thoracic back pain    -  Primary    Relevant Orders    X-Ray Thoracic Spine AP Lateral (Completed)    X-Ray Chest PA And Lateral (Completed)            Plan:         Diann was seen today for back pain.    Diagnoses and all orders for this visit:    Acute midline thoracic back pain  -     X-Ray Thoracic Spine AP Lateral; Future  -     X-Ray Chest PA And Lateral; Future  -     tiZANidine (ZANAFLEX) 2 MG tablet; Take 1 tablet (2 mg total) by mouth nightly as needed (pain).     Possible muscle strain.  Vital signs all stable.  Continue ibuprofen and start Zanaflex.  We will check x-ray to rule out compression fracture.  If pain does not resolve we refer to specialist          Haleigh Bauer MD   Internal Medicine   Primary Care           [1]   Patient Active Problem  List  Diagnosis    Osteoarthritis of right hip    S/P RIGHT total hip arthroplasty    Hyperlipidemia    Lumbar radiculopathy    Lumbar pain    Bilateral hip pain    Osteoarthritis of left hip    History of hysteroscopy D&C

## 2025-04-02 ENCOUNTER — TELEPHONE (OUTPATIENT)
Dept: OBSTETRICS AND GYNECOLOGY | Facility: CLINIC | Age: 70
End: 2025-04-02
Payer: MEDICARE

## 2025-04-02 ENCOUNTER — TELEPHONE (OUTPATIENT)
Dept: GYNECOLOGIC ONCOLOGY | Facility: CLINIC | Age: 70
End: 2025-04-02
Payer: MEDICARE

## 2025-04-02 DIAGNOSIS — C54.1 ENDOMETRIAL CARCINOMA: Primary | ICD-10-CM

## 2025-04-02 DIAGNOSIS — C54.1 ENDOMETRIAL CANCER: Primary | ICD-10-CM

## 2025-04-02 NOTE — TELEPHONE ENCOUNTER
Patient called.  Discussed findings of her recent D&C that show high grade uterine cancer.  Patient discussed possible next steps.  Recommended Dr. Farnsworth.  Dr. Farnsworth notified and message sent to team.      Samira Johnson

## 2025-04-03 ENCOUNTER — RESULTS FOLLOW-UP (OUTPATIENT)
Dept: INTERNAL MEDICINE | Facility: CLINIC | Age: 70
End: 2025-04-03

## 2025-04-03 LAB
ESTRIOL SERPL-MCNC: ABNORMAL NG/ML
ESTROGEN SERPL-MCNC: ABNORMAL PG/ML
INSULIN SERPL-ACNC: ABNORMAL U[IU]/ML
LAB AP CLINICAL INFORMATION: ABNORMAL
LAB AP DIAGNOSIS CATEGORY: ABNORMAL
LAB AP GROSS DESCRIPTION: ABNORMAL
LAB AP PERFORMING LOCATION(S): ABNORMAL
LAB AP REPORT FOOTNOTES: ABNORMAL
T3RU NFR SERPL: ABNORMAL %

## 2025-04-11 ENCOUNTER — PATIENT MESSAGE (OUTPATIENT)
Dept: OPTOMETRY | Facility: CLINIC | Age: 70
End: 2025-04-11
Payer: MEDICARE

## 2025-04-11 ENCOUNTER — HOSPITAL ENCOUNTER (OUTPATIENT)
Dept: RADIOLOGY | Facility: HOSPITAL | Age: 70
Discharge: HOME | End: 2025-04-11
Attending: OBSTETRICS & GYNECOLOGY
Payer: MEDICARE

## 2025-04-11 DIAGNOSIS — C54.1 ENDOMETRIAL CANCER: ICD-10-CM

## 2025-04-11 PROCEDURE — 71260 CT THORAX DX C+: CPT | Mod: 26,,, | Performed by: RADIOLOGY

## 2025-04-11 PROCEDURE — A9698 NON-RAD CONTRAST MATERIALNOC: HCPCS | Performed by: OBSTETRICS & GYNECOLOGY

## 2025-04-11 PROCEDURE — 25500020 PHARM REV CODE 255: Performed by: OBSTETRICS & GYNECOLOGY

## 2025-04-11 PROCEDURE — 71260 CT THORAX DX C+: CPT | Mod: TC

## 2025-04-11 PROCEDURE — 74177 CT ABD & PELVIS W/CONTRAST: CPT | Mod: 26,,, | Performed by: RADIOLOGY

## 2025-04-11 RX ADMIN — BARIUM SULFATE 450 ML: 20 SUSPENSION ORAL at 05:04

## 2025-04-11 RX ADMIN — IOHEXOL 75 ML: 350 INJECTION, SOLUTION INTRAVENOUS at 05:04

## 2025-04-12 LAB
CREAT SERPL-MCNC: 0.7 MG/DL (ref 0.5–1.4)
SAMPLE: NORMAL

## 2025-04-14 NOTE — H&P (VIEW-ONLY)
Subjective:      Patient ID: Diann Wasserman is a 69 y.o. female.    Chief Complaint: Advice Only      HPI    69 yr old para 6 referred from Dr Johnson for recently diagnosed high grade endometrial cancer. Workup for intermittent VB for the last 3-4 mo. 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  P16 is positive.  P 53 is negative (mutated).  Amacr is focally positive in tumor cells.  ER is weakly positive.  MMR p  Note:  The differential diagnoses include serous carcinoma and endometrioid carcinoma with metaplastic features.  Also, but less likely, is clear cell carcinoma    4/11/2025 CT C/A/P negative for metastatic disease     is 15    Of note: 2019 thickened endometrium, EMBx negative    No medical co morbidities. BMI 26    Prior lap cholecystectomy, BTL    Family history for breast cancer - maternal aunt daughters x 2. No other breast/gyn/colon cancer    Review of Systems   Constitutional:  Negative for chills, diaphoresis, fatigue and fever.   Respiratory:  Negative for chest tightness, shortness of breath and wheezing.    Cardiovascular:  Negative for chest pain, palpitations and leg swelling.   Gastrointestinal:  Negative for abdominal pain, constipation, diarrhea, nausea and vomiting.   Genitourinary:  Positive for vaginal bleeding. Negative for difficulty urinating, dysuria, flank pain, frequency, genital sores, hematuria, pelvic pain, urgency, vaginal discharge and vaginal pain.   Skin:  Negative for color change.   Neurological:  Negative for dizziness, light-headedness and headaches.   Psychiatric/Behavioral:  Negative for agitation.        Past Medical History:   Diagnosis Date    Arthritis     Cataract     Iris nevus, left     Right hip pain      Past Surgical History:   Procedure Laterality Date    BREAST SURGERY Bilateral     breast lift     CHOLECYSTECTOMY  2012    COLONOSCOPY N/A 04/06/2016    Procedure: COLONOSCOPY;  Surgeon: Luis Bogran-Reyes, MD;  Location: [facility];  Service: Endoscopy;  Laterality: N/A;    COSMETIC SURGERY  1985    bilateral breast lift    HIP SURGERY  11/2014    right total    HYSTEROSCOPY WITH DILATION AND CURETTAGE OF UTERUS N/A 3/28/2025    Procedure: HYSTEROSCOPY, WITH DILATION AND CURETTAGE OF UTERUS;  Surgeon: Samira Johnson MD;  Location: [facility];  Service: OB/GYN;  Laterality: N/A;    JOINT REPLACEMENT      TOTAL REDUCTION MAMMOPLASTY      LIft    TUBAL LIGATION       Family History   Problem Relation Name Age of Onset    Hypothyroidism Mother      Hypertension Father buzz     Aortic aneurysm Father buzz     Stroke Father buzz     No Known Problems Sister      No Known Problems Brother      Breast cancer Maternal Aunt  40    Breast cancer Cousin Annmarie      Social History[1]  Current Outpatient Medications   Medication Sig    acetaminophen (TYLENOL) 500 MG tablet Take 500 mg by mouth every 6 (six) hours as needed for Pain.    acetaminophen (TYLENOL) 650 MG TbSR Take 1 tablet (650 mg total) by mouth every 6 (six) hours. Alternate between ibuprofen and tylenol every 3 hours. For example: @0800: ibuprofen 600mg @1100: tylenol 650mg @1400: ibuprofen 600mg @1700: tylenol 650 mg @2000: ibuprofen 600mg    atorvastatin (LIPITOR) 20 MG tablet Take 1 tablet by mouth once daily    docusate sodium (COLACE) 100 MG capsule Take 1 capsule (100 mg total) by mouth 2 (two) times daily.    ibuprofen (ADVIL,MOTRIN) 600 MG tablet Take 1 tablet (600 mg total) by mouth every 6 (six) hours. Alternate between ibuprofen and tylenol every 3 hours. For example: @0800: ibuprofen 600mg @1100: tylenol 650mg @1400: ibuprofen 600mg @1700: tylenol 650 mg @2000: ibuprofen 600mg    meloxicam (MOBIC) 7.5 MG tablet Take 1 tablet by mouth once daily    multivit-min/iron/folic/fwa548 (HAIR, SKIN AND NAILS ADVANCED ORAL) Take by mouth Daily.     multivitamin (THERAGRAN) per tablet Take 1 tablet by mouth once daily.    TURMERIC ORAL Take by mouth Daily.     No current facility-administered medications for this visit.     Review of patient's allergies indicates:   Allergen Reactions    Erythromycin Rash    Penicillins Rash and Other (See Comments)     Redness     BP (!) 149/72   Pulse 65   Wt 62.6 kg (138 lb 0.1 oz)   BMI 25.24 kg/m²     Objective:   Physical Exam:   Constitutional: She is oriented to person, place, and time. She appears well-developed and well-nourished. No distress.    HENT:   Head: Normocephalic and atraumatic.    Eyes: No scleral icterus.     Cardiovascular:       Exam reveals no cyanosis and no edema.        Pulmonary/Chest: Effort normal. No respiratory distress.        Abdominal: Soft. She exhibits no distension and no fluid wave. There is no abdominal tenderness. There is no rigidity.     Genitourinary:    Uterus normal.      Pelvic exam was performed with patient supine.   There is no rash, tenderness or lesion on the right labia. There is no rash, tenderness or lesion on the left labia. Cervix is normal. Right adnexum displays no mass, no tenderness and no fullness. Left adnexum displays no mass, no tenderness and no fullness. There is bleeding in the vagina. No vaginal discharge in the vagina. Uterus is not enlarged, not fixed, not tender and not hosting fibroids.    Genitourinary Comments: Mobile pelvic structures  A female staff member was present to chaperone during the pelvic exam.               Musculoskeletal: Normal range of motion and moves all extremeties. No edema.       Neurological: She is alert and oriented to person, place, and time.    Skin: Skin is warm. No rash noted. No cyanosis or erythema.    Psychiatric: She has a normal mood and affect. Thought content normal.       Assessment:     1. Endometrial carcinoma        Plan:       I had an extensive conversation with the patient today giving a broad overview of  endometrial hyperplasia and endometrial cancer to include epidemiology, risk factors, clinical features, diagnosis, as well as staging and surgical treatment.  I have recommended robotic-assisted hysterectomy, bilateral salpingo-oophorectomy and sentinel lymph node mapping and biopsy.  Based on the data from surgery we will determine whether adjuvant therapy would be recommended.  We will also have a better-informed discussion about prognosis.      normal  CT CAP without obvious evidence of metastatic  disease     The risks, benefits, and indications of the procedure were discussed with the patient and her family members if present.  These included bleeding, transfusion, infection, damage to surrounding tissues (bowel, bladder, ureter), wound separation, lymphedema, conversion to laparotomy if laparoscopic, perioperative cardiac events, VTE, pneumonia, and possible death. She voiced understanding, all questions were answered and consents were signed.     I spent approximately 60 minutes reviewing the available records and evaluating the patient, out of which over 50% of the time was spent face to face with the patient in counseling and coordinating this patient's care.         [1]   Social History  Socioeconomic History    Marital status:     Years of education: 10   Tobacco Use    Smoking status: Former     Current packs/day: 0.00     Average packs/day: 0.3 packs/day for 2.1 years (0.5 ttl pk-yrs)     Types: Cigarettes     Start date: 1969     Quit date: 1971     Years since quittin.9     Passive exposure: Past    Smokeless tobacco: Never    Tobacco comments:     less than 1ppd x 3 years   Substance and Sexual Activity    Alcohol use: Yes     Alcohol/week: 5.0 standard drinks of alcohol     Types: 3 Glasses of wine, 2 Drinks containing 0.5 oz of alcohol per week     Comment: one bottle of wine on weekends    Drug use: No    Sexual activity: Yes     Partners: Male     Birth  control/protection: See Surgical Hx     Social Drivers of Health     Financial Resource Strain: Low Risk  (5/28/2024)    Overall Financial Resource Strain (CARDIA)     Difficulty of Paying Living Expenses: Not hard at all   Food Insecurity: No Food Insecurity (5/28/2024)    Hunger Vital Sign     Worried About Running Out of Food in the Last Year: Never true     Ran Out of Food in the Last Year: Never true   Transportation Needs: No Transportation Needs (11/4/2023)    PRAPARE - Transportation     Lack of Transportation (Medical): No     Lack of Transportation (Non-Medical): No   Physical Activity: Unknown (5/28/2024)    Exercise Vital Sign     Days of Exercise per Week: Patient declined     Minutes of Exercise per Session: 60 min   Stress: No Stress Concern Present (5/28/2024)    [employer] [place] [employer] - Occupational Stress Questionnaire     Feeling of Stress : Only a little   Housing Stability: Unknown (5/28/2024)    Housing Stability Vital Sign     Unable to Pay for Housing in the Last Year: No

## 2025-04-14 NOTE — PROGRESS NOTES
Subjective:      Patient ID: Diann Wasserman is a 69 y.o. female.    Chief Complaint: Advice Only      HPI    69 yr old para 6 referred from Dr Johnson for recently diagnosed high grade endometrial cancer. Workup for intermittent VB for the last 3-4 mo. 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  P16 is positive.  P 53 is negative (mutated).  Amacr is focally positive in tumor cells.  ER is weakly positive.  MMR p  Note:  The differential diagnoses include serous carcinoma and endometrioid carcinoma with metaplastic features.  Also, but less likely, is clear cell carcinoma    4/11/2025 CT C/A/P negative for metastatic disease     is 15    Of note: 2019 thickened endometrium, EMBx negative    No medical co morbidities. BMI 26    Prior lap cholecystectomy, BTL    Family history for breast cancer - maternal aunt daughters x 2. No other breast/gyn/colon cancer    Review of Systems   Constitutional:  Negative for chills, diaphoresis, fatigue and fever.   Respiratory:  Negative for chest tightness, shortness of breath and wheezing.    Cardiovascular:  Negative for chest pain, palpitations and leg swelling.   Gastrointestinal:  Negative for abdominal pain, constipation, diarrhea, nausea and vomiting.   Genitourinary:  Positive for vaginal bleeding. Negative for difficulty urinating, dysuria, flank pain, frequency, genital sores, hematuria, pelvic pain, urgency, vaginal discharge and vaginal pain.   Skin:  Negative for color change.   Neurological:  Negative for dizziness, light-headedness and headaches.   Psychiatric/Behavioral:  Negative for agitation.        Past Medical History:   Diagnosis Date    Arthritis     Cataract     Iris nevus, left     Right hip pain      Past Surgical History:   Procedure Laterality Date    BREAST SURGERY Bilateral     breast lift     CHOLECYSTECTOMY  2012    COLONOSCOPY N/A 04/06/2016    Procedure: COLONOSCOPY;  Surgeon: Luis Bogran-Reyes, MD;  Location: [facility];  Service: Endoscopy;  Laterality: N/A;    COSMETIC SURGERY  1985    bilateral breast lift    HIP SURGERY  11/2014    right total    HYSTEROSCOPY WITH DILATION AND CURETTAGE OF UTERUS N/A 3/28/2025    Procedure: HYSTEROSCOPY, WITH DILATION AND CURETTAGE OF UTERUS;  Surgeon: Samira Johnson MD;  Location: [facility];  Service: OB/GYN;  Laterality: N/A;    JOINT REPLACEMENT      TOTAL REDUCTION MAMMOPLASTY      LIft    TUBAL LIGATION       Family History   Problem Relation Name Age of Onset    Hypothyroidism Mother      Hypertension Father buzz     Aortic aneurysm Father buzz     Stroke Father buzz     No Known Problems Sister      No Known Problems Brother      Breast cancer Maternal Aunt  40    Breast cancer Cousin Annmarie      Social History[1]  Current Outpatient Medications   Medication Sig    acetaminophen (TYLENOL) 500 MG tablet Take 500 mg by mouth every 6 (six) hours as needed for Pain.    acetaminophen (TYLENOL) 650 MG TbSR Take 1 tablet (650 mg total) by mouth every 6 (six) hours. Alternate between ibuprofen and tylenol every 3 hours. For example: @0800: ibuprofen 600mg @1100: tylenol 650mg @1400: ibuprofen 600mg @1700: tylenol 650 mg @2000: ibuprofen 600mg    atorvastatin (LIPITOR) 20 MG tablet Take 1 tablet by mouth once daily    docusate sodium (COLACE) 100 MG capsule Take 1 capsule (100 mg total) by mouth 2 (two) times daily.    ibuprofen (ADVIL,MOTRIN) 600 MG tablet Take 1 tablet (600 mg total) by mouth every 6 (six) hours. Alternate between ibuprofen and tylenol every 3 hours. For example: @0800: ibuprofen 600mg @1100: tylenol 650mg @1400: ibuprofen 600mg @1700: tylenol 650 mg @2000: ibuprofen 600mg    meloxicam (MOBIC) 7.5 MG tablet Take 1 tablet by mouth once daily    multivit-min/iron/folic/ipn683 (HAIR, SKIN AND NAILS ADVANCED ORAL) Take by mouth Daily.     multivitamin (THERAGRAN) per tablet Take 1 tablet by mouth once daily.    TURMERIC ORAL Take by mouth Daily.     No current facility-administered medications for this visit.     Review of patient's allergies indicates:   Allergen Reactions    Erythromycin Rash    Penicillins Rash and Other (See Comments)     Redness     BP (!) 149/72   Pulse 65   Wt 62.6 kg (138 lb 0.1 oz)   BMI 25.24 kg/m²     Objective:   Physical Exam:   Constitutional: She is oriented to person, place, and time. She appears well-developed and well-nourished. No distress.    HENT:   Head: Normocephalic and atraumatic.    Eyes: No scleral icterus.     Cardiovascular:       Exam reveals no cyanosis and no edema.        Pulmonary/Chest: Effort normal. No respiratory distress.        Abdominal: Soft. She exhibits no distension and no fluid wave. There is no abdominal tenderness. There is no rigidity.     Genitourinary:    Uterus normal.      Pelvic exam was performed with patient supine.   There is no rash, tenderness or lesion on the right labia. There is no rash, tenderness or lesion on the left labia. Cervix is normal. Right adnexum displays no mass, no tenderness and no fullness. Left adnexum displays no mass, no tenderness and no fullness. There is bleeding in the vagina. No vaginal discharge in the vagina. Uterus is not enlarged, not fixed, not tender and not hosting fibroids.    Genitourinary Comments: Mobile pelvic structures  A female staff member was present to chaperone during the pelvic exam.               Musculoskeletal: Normal range of motion and moves all extremeties. No edema.       Neurological: She is alert and oriented to person, place, and time.    Skin: Skin is warm. No rash noted. No cyanosis or erythema.    Psychiatric: She has a normal mood and affect. Thought content normal.       Assessment:     1. Endometrial carcinoma        Plan:       I had an extensive conversation with the patient today giving a broad overview of  endometrial hyperplasia and endometrial cancer to include epidemiology, risk factors, clinical features, diagnosis, as well as staging and surgical treatment.  I have recommended robotic-assisted hysterectomy, bilateral salpingo-oophorectomy and sentinel lymph node mapping and biopsy.  Based on the data from surgery we will determine whether adjuvant therapy would be recommended.  We will also have a better-informed discussion about prognosis.      normal  CT CAP without obvious evidence of metastatic  disease     The risks, benefits, and indications of the procedure were discussed with the patient and her family members if present.  These included bleeding, transfusion, infection, damage to surrounding tissues (bowel, bladder, ureter), wound separation, lymphedema, conversion to laparotomy if laparoscopic, perioperative cardiac events, VTE, pneumonia, and possible death. She voiced understanding, all questions were answered and consents were signed.     I spent approximately 60 minutes reviewing the available records and evaluating the patient, out of which over 50% of the time was spent face to face with the patient in counseling and coordinating this patient's care.         [1]   Social History  Socioeconomic History    Marital status:     Years of education: 10   Tobacco Use    Smoking status: Former     Current packs/day: 0.00     Average packs/day: 0.3 packs/day for 2.1 years (0.5 ttl pk-yrs)     Types: Cigarettes     Start date: 1969     Quit date: 1971     Years since quittin.9     Passive exposure: Past    Smokeless tobacco: Never    Tobacco comments:     less than 1ppd x 3 years   Substance and Sexual Activity    Alcohol use: Yes     Alcohol/week: 5.0 standard drinks of alcohol     Types: 3 Glasses of wine, 2 Drinks containing 0.5 oz of alcohol per week     Comment: one bottle of wine on weekends    Drug use: No    Sexual activity: Yes     Partners: Male     Birth  control/protection: See Surgical Hx     Social Drivers of Health     Financial Resource Strain: Low Risk  (5/28/2024)    Overall Financial Resource Strain (CARDIA)     Difficulty of Paying Living Expenses: Not hard at all   Food Insecurity: No Food Insecurity (5/28/2024)    Hunger Vital Sign     Worried About Running Out of Food in the Last Year: Never true     Ran Out of Food in the Last Year: Never true   Transportation Needs: No Transportation Needs (11/4/2023)    PRAPARE - Transportation     Lack of Transportation (Medical): No     Lack of Transportation (Non-Medical): No   Physical Activity: Unknown (5/28/2024)    Exercise Vital Sign     Days of Exercise per Week: Patient declined     Minutes of Exercise per Session: 60 min   Stress: No Stress Concern Present (5/28/2024)    [employer] [place] [employer] - Occupational Stress Questionnaire     Feeling of Stress : Only a little   Housing Stability: Unknown (5/28/2024)    Housing Stability Vital Sign     Unable to Pay for Housing in the Last Year: No

## 2025-04-15 ENCOUNTER — TELEPHONE (OUTPATIENT)
Dept: GYNECOLOGIC ONCOLOGY | Facility: CLINIC | Age: 70
End: 2025-04-15
Payer: MEDICARE

## 2025-04-15 ENCOUNTER — OFFICE VISIT (OUTPATIENT)
Dept: GYNECOLOGIC ONCOLOGY | Facility: CLINIC | Age: 70
End: 2025-04-15
Payer: MEDICARE

## 2025-04-15 VITALS
WEIGHT: 138 LBS | DIASTOLIC BLOOD PRESSURE: 72 MMHG | SYSTOLIC BLOOD PRESSURE: 149 MMHG | BODY MASS INDEX: 25.24 KG/M2 | HEART RATE: 65 BPM

## 2025-04-15 DIAGNOSIS — C54.1 ENDOMETRIAL CANCER: Primary | ICD-10-CM

## 2025-04-15 DIAGNOSIS — R93.89 THICKENED ENDOMETRIUM: ICD-10-CM

## 2025-04-15 DIAGNOSIS — C54.1 ENDOMETRIAL CARCINOMA: ICD-10-CM

## 2025-04-15 DIAGNOSIS — N95.0 PMB (POSTMENOPAUSAL BLEEDING): ICD-10-CM

## 2025-04-15 DIAGNOSIS — R93.89 ENDOMETRIAL THICKENING ON ULTRASOUND: ICD-10-CM

## 2025-04-15 PROCEDURE — 99999 PR PBB SHADOW E&M-EST. PATIENT-LVL III: CPT | Mod: PBBFAC,,, | Performed by: OBSTETRICS & GYNECOLOGY

## 2025-04-15 RX ORDER — FAMOTIDINE 20 MG/1
20 TABLET, FILM COATED ORAL
OUTPATIENT
Start: 2025-04-15

## 2025-04-15 RX ORDER — HEPARIN SODIUM 5000 [USP'U]/ML
5000 INJECTION, SOLUTION INTRAVENOUS; SUBCUTANEOUS
OUTPATIENT
Start: 2025-04-21 | End: 2025-04-21

## 2025-04-15 RX ORDER — MUPIROCIN 20 MG/G
OINTMENT TOPICAL
OUTPATIENT
Start: 2025-04-15

## 2025-04-15 RX ORDER — CLINDAMYCIN PHOSPHATE 900 MG/50ML
900 INJECTION, SOLUTION INTRAVENOUS
OUTPATIENT
Start: 2025-04-15

## 2025-04-15 RX ORDER — SODIUM CHLORIDE 9 MG/ML
INJECTION, SOLUTION INTRAVENOUS CONTINUOUS
OUTPATIENT
Start: 2025-04-15

## 2025-04-15 NOTE — PROGRESS NOTES
Surgery Teaching completed.   Discussed surgery consents/blood consents. VU.  A copy of consent given to patient.  Enhanced Recovery to Gynecological Surgery:  Pre-op/Post-op visits. Dianna will reach out to her to schedule the appointments. Additional information will be provided. VU.  Pain Management. Tylenol/Ibuprofen and Narcotic. VU  Activity after surgery. Booklet in folder to review.VU.  Bleeding/Infection Precautions. VU

## 2025-04-16 ENCOUNTER — TELEPHONE (OUTPATIENT)
Dept: GYNECOLOGIC ONCOLOGY | Facility: CLINIC | Age: 70
End: 2025-04-16
Payer: MEDICARE

## 2025-04-17 ENCOUNTER — TELEPHONE (OUTPATIENT)
Dept: GYNECOLOGIC ONCOLOGY | Facility: CLINIC | Age: 70
End: 2025-04-17
Payer: MEDICARE

## 2025-04-21 ENCOUNTER — PATIENT MESSAGE (OUTPATIENT)
Dept: SURGERY | Facility: HOSPITAL | Age: 70
End: 2025-04-21
Payer: MEDICARE

## 2025-04-21 ENCOUNTER — NURSE TRIAGE (OUTPATIENT)
Dept: ADMINISTRATIVE | Facility: CLINIC | Age: 70
End: 2025-04-21
Payer: MEDICARE

## 2025-04-21 ENCOUNTER — ANESTHESIA (OUTPATIENT)
Dept: SURGERY | Facility: HOSPITAL | Age: 70
DRG: 741 | End: 2025-04-21
Payer: MEDICARE

## 2025-04-21 ENCOUNTER — HOSPITAL ENCOUNTER (INPATIENT)
Facility: HOSPITAL | Age: 70
LOS: 1 days | Discharge: HOME | DRG: 741 | End: 2025-04-21
Attending: OBSTETRICS & GYNECOLOGY | Admitting: OBSTETRICS & GYNECOLOGY
Payer: MEDICARE

## 2025-04-21 ENCOUNTER — ANESTHESIA EVENT (OUTPATIENT)
Dept: SURGERY | Facility: HOSPITAL | Age: 70
DRG: 741 | End: 2025-04-21
Payer: MEDICARE

## 2025-04-21 ENCOUNTER — HOSPITAL ENCOUNTER (EMERGENCY)
Facility: HOSPITAL | Age: 70
Discharge: HOME | End: 2025-04-22
Attending: EMERGENCY MEDICINE
Payer: MEDICARE

## 2025-04-21 VITALS
HEIGHT: 62 IN | WEIGHT: 135 LBS | SYSTOLIC BLOOD PRESSURE: 181 MMHG | BODY MASS INDEX: 24.84 KG/M2 | DIASTOLIC BLOOD PRESSURE: 85 MMHG | RESPIRATION RATE: 13 BRPM | HEART RATE: 89 BPM | OXYGEN SATURATION: 98 % | TEMPERATURE: 98 F

## 2025-04-21 DIAGNOSIS — C54.1 ENDOMETRIAL CARCINOMA: ICD-10-CM

## 2025-04-21 DIAGNOSIS — Z98.890 S/P ROBOT-ASSISTED SURGICAL PROCEDURE: Primary | ICD-10-CM

## 2025-04-21 DIAGNOSIS — N95.0 PMB (POSTMENOPAUSAL BLEEDING): ICD-10-CM

## 2025-04-21 DIAGNOSIS — C54.1 ENDOMETRIAL CANCER: ICD-10-CM

## 2025-04-21 DIAGNOSIS — R11.2 POSTOPERATIVE NAUSEA AND VOMITING: ICD-10-CM

## 2025-04-21 DIAGNOSIS — G89.18 POST-OPERATIVE PAIN: Primary | ICD-10-CM

## 2025-04-21 DIAGNOSIS — R93.89 THICKENED ENDOMETRIUM: ICD-10-CM

## 2025-04-21 DIAGNOSIS — Z98.890 POSTOPERATIVE NAUSEA AND VOMITING: ICD-10-CM

## 2025-04-21 LAB
INDIRECT COOMBS: NORMAL
RH BLD: NORMAL
SPECIMEN OUTDATE: NORMAL

## 2025-04-21 PROCEDURE — 88342 IMHCHEM/IMCYTCHM 1ST ANTB: CPT | Mod: TC | Performed by: OBSTETRICS & GYNECOLOGY

## 2025-04-21 PROCEDURE — 63600175 PHARM REV CODE 636 W HCPCS: Performed by: OBSTETRICS & GYNECOLOGY

## 2025-04-21 PROCEDURE — 96375 TX/PRO/DX INJ NEW DRUG ADDON: CPT

## 2025-04-21 PROCEDURE — 99284 EMERGENCY DEPT VISIT MOD MDM: CPT | Mod: 25

## 2025-04-21 PROCEDURE — 63600175 PHARM REV CODE 636 W HCPCS

## 2025-04-21 PROCEDURE — 88342 IMHCHEM/IMCYTCHM 1ST ANTB: CPT | Mod: 26,,, | Performed by: STUDENT IN AN ORGANIZED HEALTH CARE EDUCATION/TRAINING PROGRAM

## 2025-04-21 PROCEDURE — 37000009 HC ANESTHESIA EA ADD 15 MINS: Performed by: OBSTETRICS & GYNECOLOGY

## 2025-04-21 PROCEDURE — 11000001 HC ACUTE MED/SURG PRIVATE ROOM

## 2025-04-21 PROCEDURE — 63600175 PHARM REV CODE 636 W HCPCS: Mod: JZ,TB

## 2025-04-21 PROCEDURE — 36000712 HC OR TIME LEV V 1ST 15 MIN: Performed by: OBSTETRICS & GYNECOLOGY

## 2025-04-21 PROCEDURE — 27201423 OPTIME MED/SURG SUP & DEVICES STERILE SUPPLY: Performed by: OBSTETRICS & GYNECOLOGY

## 2025-04-21 PROCEDURE — 58571 TLH W/T/O 250 G OR LESS: CPT | Mod: AS,,, | Performed by: PHYSICIAN ASSISTANT

## 2025-04-21 PROCEDURE — 71000044 HC DOSC ROUTINE RECOVERY FIRST HOUR: Performed by: OBSTETRICS & GYNECOLOGY

## 2025-04-21 PROCEDURE — 38570 LAPAROSCOPY LYMPH NODE BIOP: CPT | Mod: 51,,, | Performed by: OBSTETRICS & GYNECOLOGY

## 2025-04-21 PROCEDURE — 38900 IO MAP OF SENT LYMPH NODE: CPT | Mod: 50,AS,, | Performed by: PHYSICIAN ASSISTANT

## 2025-04-21 PROCEDURE — 86850 RBC ANTIBODY SCREEN: CPT | Performed by: OBSTETRICS & GYNECOLOGY

## 2025-04-21 PROCEDURE — 88307 TISSUE EXAM BY PATHOLOGIST: CPT | Mod: 26,,, | Performed by: STUDENT IN AN ORGANIZED HEALTH CARE EDUCATION/TRAINING PROGRAM

## 2025-04-21 PROCEDURE — 38900 IO MAP OF SENT LYMPH NODE: CPT | Mod: 50,,, | Performed by: OBSTETRICS & GYNECOLOGY

## 2025-04-21 PROCEDURE — 25000003 PHARM REV CODE 250

## 2025-04-21 PROCEDURE — 0UT94ZZ RESECTION OF UTERUS, PERCUTANEOUS ENDOSCOPIC APPROACH: ICD-10-PCS | Performed by: OBSTETRICS & GYNECOLOGY

## 2025-04-21 PROCEDURE — 07BC4ZX EXCISION OF PELVIS LYMPHATIC, PERCUTANEOUS ENDOSCOPIC APPROACH, DIAGNOSTIC: ICD-10-PCS | Performed by: OBSTETRICS & GYNECOLOGY

## 2025-04-21 PROCEDURE — 58571 TLH W/T/O 250 G OR LESS: CPT | Mod: ,,, | Performed by: OBSTETRICS & GYNECOLOGY

## 2025-04-21 PROCEDURE — 25000003 PHARM REV CODE 250: Performed by: OBSTETRICS & GYNECOLOGY

## 2025-04-21 PROCEDURE — 88305 TISSUE EXAM BY PATHOLOGIST: CPT | Mod: 26,,, | Performed by: STUDENT IN AN ORGANIZED HEALTH CARE EDUCATION/TRAINING PROGRAM

## 2025-04-21 PROCEDURE — 0UT24ZZ RESECTION OF BILATERAL OVARIES, PERCUTANEOUS ENDOSCOPIC APPROACH: ICD-10-PCS | Performed by: OBSTETRICS & GYNECOLOGY

## 2025-04-21 PROCEDURE — 8E0W4CZ ROBOTIC ASSISTED PROCEDURE OF TRUNK REGION, PERCUTANEOUS ENDOSCOPIC APPROACH: ICD-10-PCS | Performed by: OBSTETRICS & GYNECOLOGY

## 2025-04-21 PROCEDURE — 88112 CYTOPATH CELL ENHANCE TECH: CPT | Mod: 26,,, | Performed by: STUDENT IN AN ORGANIZED HEALTH CARE EDUCATION/TRAINING PROGRAM

## 2025-04-21 PROCEDURE — 0UT74ZZ RESECTION OF BILATERAL FALLOPIAN TUBES, PERCUTANEOUS ENDOSCOPIC APPROACH: ICD-10-PCS | Performed by: OBSTETRICS & GYNECOLOGY

## 2025-04-21 PROCEDURE — 38570 LAPAROSCOPY LYMPH NODE BIOP: CPT | Mod: AS,51,, | Performed by: PHYSICIAN ASSISTANT

## 2025-04-21 PROCEDURE — 88112 CYTOPATH CELL ENHANCE TECH: CPT | Mod: TC | Performed by: OBSTETRICS & GYNECOLOGY

## 2025-04-21 PROCEDURE — 25000003 PHARM REV CODE 250: Performed by: STUDENT IN AN ORGANIZED HEALTH CARE EDUCATION/TRAINING PROGRAM

## 2025-04-21 PROCEDURE — 36000713 HC OR TIME LEV V EA ADD 15 MIN: Performed by: OBSTETRICS & GYNECOLOGY

## 2025-04-21 PROCEDURE — 88309 TISSUE EXAM BY PATHOLOGIST: CPT | Mod: 26,,, | Performed by: STUDENT IN AN ORGANIZED HEALTH CARE EDUCATION/TRAINING PROGRAM

## 2025-04-21 PROCEDURE — 71000015 HC POSTOP RECOV 1ST HR: Performed by: OBSTETRICS & GYNECOLOGY

## 2025-04-21 PROCEDURE — 37000008 HC ANESTHESIA 1ST 15 MINUTES: Performed by: OBSTETRICS & GYNECOLOGY

## 2025-04-21 PROCEDURE — 88341 IMHCHEM/IMCYTCHM EA ADD ANTB: CPT | Mod: 26,,, | Performed by: STUDENT IN AN ORGANIZED HEALTH CARE EDUCATION/TRAINING PROGRAM

## 2025-04-21 RX ORDER — ROCURONIUM BROMIDE 10 MG/ML
INJECTION, SOLUTION INTRAVENOUS
Status: DISCONTINUED | OUTPATIENT
Start: 2025-04-21 | End: 2025-04-21

## 2025-04-21 RX ORDER — DEXTROMETHORPHAN HYDROBROMIDE, GUAIFENESIN 5; 100 MG/5ML; MG/5ML
650 LIQUID ORAL EVERY 6 HOURS PRN
Qty: 30 TABLET | Refills: 1 | Status: SHIPPED | OUTPATIENT
Start: 2025-04-21

## 2025-04-21 RX ORDER — DEXAMETHASONE SODIUM PHOSPHATE 4 MG/ML
INJECTION, SOLUTION INTRA-ARTICULAR; INTRALESIONAL; INTRAMUSCULAR; INTRAVENOUS; SOFT TISSUE
Status: DISCONTINUED | OUTPATIENT
Start: 2025-04-21 | End: 2025-04-21

## 2025-04-21 RX ORDER — ONDANSETRON HYDROCHLORIDE 2 MG/ML
4 INJECTION, SOLUTION INTRAVENOUS EVERY 4 HOURS PRN
Status: DISCONTINUED | OUTPATIENT
Start: 2025-04-21 | End: 2025-04-21 | Stop reason: HOSPADM

## 2025-04-21 RX ORDER — METHOCARBAMOL 500 MG/1
1000 TABLET, FILM COATED ORAL ONCE
Status: COMPLETED | OUTPATIENT
Start: 2025-04-21 | End: 2025-04-21

## 2025-04-21 RX ORDER — FAMOTIDINE 20 MG/1
20 TABLET, FILM COATED ORAL
Status: COMPLETED | OUTPATIENT
Start: 2025-04-21 | End: 2025-04-21

## 2025-04-21 RX ORDER — AMOXICILLIN 250 MG
2 CAPSULE ORAL DAILY
Qty: 30 TABLET | Refills: 0 | Status: SHIPPED | OUTPATIENT
Start: 2025-04-21

## 2025-04-21 RX ORDER — MORPHINE SULFATE 4 MG/ML
4 INJECTION, SOLUTION INTRAMUSCULAR; INTRAVENOUS
Refills: 0 | Status: COMPLETED | OUTPATIENT
Start: 2025-04-22 | End: 2025-04-21

## 2025-04-21 RX ORDER — LIDOCAINE HYDROCHLORIDE 20 MG/ML
INJECTION, SOLUTION EPIDURAL; INFILTRATION; INTRACAUDAL; PERINEURAL
Status: DISCONTINUED | OUTPATIENT
Start: 2025-04-21 | End: 2025-04-21

## 2025-04-21 RX ORDER — SODIUM CHLORIDE 9 MG/ML
INJECTION, SOLUTION INTRAVENOUS CONTINUOUS
Status: DISCONTINUED | OUTPATIENT
Start: 2025-04-21 | End: 2025-04-21 | Stop reason: HOSPADM

## 2025-04-21 RX ORDER — PROPOFOL 10 MG/ML
VIAL (ML) INTRAVENOUS
Status: DISCONTINUED | OUTPATIENT
Start: 2025-04-21 | End: 2025-04-21

## 2025-04-21 RX ORDER — ONDANSETRON HYDROCHLORIDE 2 MG/ML
4 INJECTION, SOLUTION INTRAVENOUS
Status: COMPLETED | OUTPATIENT
Start: 2025-04-22 | End: 2025-04-21

## 2025-04-21 RX ORDER — MIDAZOLAM HYDROCHLORIDE 1 MG/ML
INJECTION INTRAMUSCULAR; INTRAVENOUS
Status: DISCONTINUED | OUTPATIENT
Start: 2025-04-21 | End: 2025-04-21

## 2025-04-21 RX ORDER — CLINDAMYCIN PHOSPHATE 900 MG/50ML
900 INJECTION, SOLUTION INTRAVENOUS
Status: COMPLETED | OUTPATIENT
Start: 2025-04-21 | End: 2025-04-21

## 2025-04-21 RX ORDER — ONDANSETRON HYDROCHLORIDE 2 MG/ML
INJECTION, SOLUTION INTRAVENOUS
Status: DISCONTINUED | OUTPATIENT
Start: 2025-04-21 | End: 2025-04-21

## 2025-04-21 RX ORDER — HYDROMORPHONE HYDROCHLORIDE 1 MG/ML
0.2 INJECTION, SOLUTION INTRAMUSCULAR; INTRAVENOUS; SUBCUTANEOUS EVERY 5 MIN PRN
Status: COMPLETED | OUTPATIENT
Start: 2025-04-21 | End: 2025-04-21

## 2025-04-21 RX ORDER — EPHEDRINE SULFATE 50 MG/ML
INJECTION, SOLUTION INTRAVENOUS
Status: DISCONTINUED | OUTPATIENT
Start: 2025-04-21 | End: 2025-04-21

## 2025-04-21 RX ORDER — ACETAMINOPHEN 10 MG/ML
INJECTION, SOLUTION INTRAVENOUS
Status: DISCONTINUED | OUTPATIENT
Start: 2025-04-21 | End: 2025-04-21

## 2025-04-21 RX ORDER — OXYCODONE HYDROCHLORIDE 5 MG/1
5 TABLET ORAL EVERY 4 HOURS PRN
Status: DISCONTINUED | OUTPATIENT
Start: 2025-04-21 | End: 2025-04-21 | Stop reason: HOSPADM

## 2025-04-21 RX ORDER — ACETAMINOPHEN 500 MG
1000 TABLET ORAL EVERY 6 HOURS PRN
Status: DISCONTINUED | OUTPATIENT
Start: 2025-04-21 | End: 2025-04-21 | Stop reason: HOSPADM

## 2025-04-21 RX ORDER — KETOROLAC TROMETHAMINE 15 MG/ML
15 INJECTION, SOLUTION INTRAMUSCULAR; INTRAVENOUS EVERY 6 HOURS PRN
Status: DISCONTINUED | OUTPATIENT
Start: 2025-04-21 | End: 2025-04-21 | Stop reason: HOSPADM

## 2025-04-21 RX ORDER — OXYCODONE HYDROCHLORIDE 5 MG/1
5 TABLET ORAL EVERY 4 HOURS PRN
Qty: 15 TABLET | Refills: 0 | Status: SHIPPED | OUTPATIENT
Start: 2025-04-21

## 2025-04-21 RX ORDER — FENTANYL CITRATE 50 UG/ML
INJECTION, SOLUTION INTRAMUSCULAR; INTRAVENOUS
Status: DISCONTINUED | OUTPATIENT
Start: 2025-04-21 | End: 2025-04-21

## 2025-04-21 RX ORDER — HYDROMORPHONE HYDROCHLORIDE 1 MG/ML
0.2 INJECTION, SOLUTION INTRAMUSCULAR; INTRAVENOUS; SUBCUTANEOUS
Status: DISCONTINUED | OUTPATIENT
Start: 2025-04-21 | End: 2025-04-21 | Stop reason: HOSPADM

## 2025-04-21 RX ORDER — SODIUM CHLORIDE 0.9 % (FLUSH) 0.9 %
10 SYRINGE (ML) INJECTION
Status: DISCONTINUED | OUTPATIENT
Start: 2025-04-21 | End: 2025-04-21 | Stop reason: HOSPADM

## 2025-04-21 RX ORDER — IBUPROFEN 600 MG/1
600 TABLET ORAL EVERY 6 HOURS PRN
Qty: 30 TABLET | Refills: 1 | Status: SHIPPED | OUTPATIENT
Start: 2025-04-21

## 2025-04-21 RX ORDER — MUPIROCIN 20 MG/G
OINTMENT TOPICAL
Status: DISCONTINUED | OUTPATIENT
Start: 2025-04-21 | End: 2025-04-21 | Stop reason: HOSPADM

## 2025-04-21 RX ORDER — HEPARIN SODIUM 5000 [USP'U]/ML
5000 INJECTION, SOLUTION INTRAVENOUS; SUBCUTANEOUS
Status: COMPLETED | OUTPATIENT
Start: 2025-04-21 | End: 2025-04-21

## 2025-04-21 RX ORDER — GLUCAGON 1 MG
1 KIT INJECTION
Status: DISCONTINUED | OUTPATIENT
Start: 2025-04-21 | End: 2025-04-21 | Stop reason: HOSPADM

## 2025-04-21 RX ORDER — HALOPERIDOL LACTATE 5 MG/ML
0.5 INJECTION, SOLUTION INTRAMUSCULAR EVERY 10 MIN PRN
Status: DISCONTINUED | OUTPATIENT
Start: 2025-04-21 | End: 2025-04-21 | Stop reason: HOSPADM

## 2025-04-21 RX ADMIN — FENTANYL CITRATE 25 MCG: 50 INJECTION INTRAMUSCULAR; INTRAVENOUS at 10:04

## 2025-04-21 RX ADMIN — KETOROLAC TROMETHAMINE 15 MG: 15 INJECTION, SOLUTION INTRAMUSCULAR; INTRAVENOUS at 11:04

## 2025-04-21 RX ADMIN — HEPARIN SODIUM 5000 UNITS: 5000 INJECTION INTRAVENOUS; SUBCUTANEOUS at 05:04

## 2025-04-21 RX ADMIN — TACROLIMUS 900 MG: 0.5 CAPSULE ORAL at 07:04

## 2025-04-21 RX ADMIN — HYDROMORPHONE HYDROCHLORIDE 0.2 MG: 1 INJECTION, SOLUTION INTRAMUSCULAR; INTRAVENOUS; SUBCUTANEOUS at 10:04

## 2025-04-21 RX ADMIN — EPHEDRINE SULFATE 5 MG: 50 INJECTION INTRAVENOUS at 07:04

## 2025-04-21 RX ADMIN — HYDROMORPHONE HYDROCHLORIDE 0.2 MG: 1 INJECTION, SOLUTION INTRAMUSCULAR; INTRAVENOUS; SUBCUTANEOUS at 11:04

## 2025-04-21 RX ADMIN — SODIUM CHLORIDE: 0.9 INJECTION, SOLUTION INTRAVENOUS at 07:04

## 2025-04-21 RX ADMIN — FENTANYL CITRATE 100 MCG: 50 INJECTION INTRAMUSCULAR; INTRAVENOUS at 07:04

## 2025-04-21 RX ADMIN — DEXAMETHASONE SODIUM PHOSPHATE 4 MG: 4 INJECTION INTRA-ARTICULAR; INTRALESIONAL; INTRAMUSCULAR; INTRAVENOUS; SOFT TISSUE at 07:04

## 2025-04-21 RX ADMIN — OXYCODONE 5 MG: 5 TABLET ORAL at 11:04

## 2025-04-21 RX ADMIN — ACETAMINOPHEN 1000 MG: 10 INJECTION, SOLUTION INTRAVENOUS at 07:04

## 2025-04-21 RX ADMIN — ROCURONIUM BROMIDE 50 MG: 10 INJECTION INTRAVENOUS at 07:04

## 2025-04-21 RX ADMIN — MUPIROCIN: 20 OINTMENT TOPICAL at 05:04

## 2025-04-21 RX ADMIN — HALOPERIDOL LACTATE 0.5 MG: 5 INJECTION, SOLUTION INTRAMUSCULAR at 11:04

## 2025-04-21 RX ADMIN — ROCURONIUM BROMIDE 30 MG: 10 INJECTION INTRAVENOUS at 08:04

## 2025-04-21 RX ADMIN — LIDOCAINE HYDROCHLORIDE 80 MG: 20 INJECTION, SOLUTION EPIDURAL; INFILTRATION; INTRACAUDAL at 07:04

## 2025-04-21 RX ADMIN — ONDANSETRON 4 MG: 2 INJECTION INTRAMUSCULAR; INTRAVENOUS at 11:04

## 2025-04-21 RX ADMIN — ONDANSETRON 4 MG: 2 INJECTION INTRAMUSCULAR; INTRAVENOUS at 10:04

## 2025-04-21 RX ADMIN — SODIUM CHLORIDE: 9 INJECTION, SOLUTION INTRAVENOUS at 05:04

## 2025-04-21 RX ADMIN — PROPOFOL 140 MG: 10 INJECTION, EMULSION INTRAVENOUS at 07:04

## 2025-04-21 RX ADMIN — SUGAMMADEX 200 MG: 100 INJECTION, SOLUTION INTRAVENOUS at 10:04

## 2025-04-21 RX ADMIN — MIDAZOLAM 2 MG: 1 INJECTION INTRAMUSCULAR; INTRAVENOUS at 07:04

## 2025-04-21 RX ADMIN — FAMOTIDINE 20 MG: 20 TABLET, FILM COATED ORAL at 05:04

## 2025-04-21 RX ADMIN — METHOCARBAMOL 1000 MG: 500 TABLET ORAL at 12:04

## 2025-04-21 RX ADMIN — MORPHINE SULFATE 4 MG: 4 INJECTION INTRAVENOUS at 11:04

## 2025-04-21 NOTE — ANESTHESIA PREPROCEDURE EVALUATION
[facility]  Anesthesia Pre-Operative Evaluation         Patient Name: Diann Wasserman  YOB: 1955  MRN: [id number]    SUBJECTIVE:     Pre-operative evaluation for Procedure(s) (LRB):  XI ROBOTIC HYSTERECTOMY,WITH SALPINGO-OOPHORECTOMY (Bilateral)  MAPPING, LYMPH NODE, SENTINEL (N/A)  BIOPSY, LYMPH NODE (N/A)     04/21/2025    Diann Wasserman is a 69 y.o. female w/ a significant PMHx of endometrial cancer. Asymptomatic other than vaginal bleeding. No problems with prior anesthesia.    Patient now presents for the above procedure(s).      LDA: None documented.    Prev airway:     Intubation     Date/Time: 3/28/2025 2:31 PM     Performed by: Nadine Bui  Authorized by: Will Ewing MD    Intubation:     Induction:  Intravenous    Intubated:  Postinduction    Mask Ventilation:  Easy mask    Attempts:  1    Attempted By:  CRNA    Difficult Airway Encountered?: No      Complications:  None    Airway Device:  Supraglottic airway/LMA    Airway Device Size:  4.0    Placement Verified By:  Capnometry    Complicating Factors:  None    Findings Post-Intubation:  BS equal bilateral and atraumatic/condition of teeth unchanged          Drips: None documented.    Problem List[1]    Review of patient's allergies indicates:   Allergen Reactions    Erythromycin Rash    Penicillins Rash and Other (See Comments)     Redness       Current Outpatient Medications:  Current Medications[2]    Past Surgical History:   Procedure Laterality Date    BREAST SURGERY Bilateral     breast lift    CHOLECYSTECTOMY  2012    COLONOSCOPY N/A 04/06/2016    Procedure: COLONOSCOPY;  Surgeon: Luis Bogran-Reyes, MD;  [facility];  Service: Endoscopy;  Laterality: N/A;    COSMETIC SURGERY  1985    bilateral breast lift    HIP SURGERY  11/2014    right total    HYSTEROSCOPY WITH DILATION AND CURETTAGE OF UTERUS N/A 3/28/2025    Procedure: HYSTEROSCOPY, WITH DILATION AND CURETTAGE OF UTERUS;  Surgeon:  Samira Johnson MD;  Location: [facility];  Service: OB/GYN;  Laterality: N/A;    JOINT REPLACEMENT      TOTAL REDUCTION MAMMOPLASTY      LIft    TUBAL LIGATION         Social History[3]    OBJECTIVE:     Vital Signs Range (Last 24H):  Temp:  [37 °C (98.6 °F)]   Pulse:  [72-75]   Resp:  [18]   BP: (144)/(70)   SpO2:  [99 %]       Significant Labs:  Lab Results   Component Value Date    WBC 6.89 03/27/2025    HGB 13.1 03/27/2025    HCT 39.5 03/27/2025     03/27/2025    CHOL 171 02/05/2025    TRIG 54 02/05/2025    HDL 80 (H) 02/05/2025    ALT 32 02/05/2025    AST 23 02/05/2025     02/05/2025    K 4.1 02/05/2025     02/05/2025    CREATININE 0.8 04/11/2025    BUN 15 02/05/2025    CO2 25 02/05/2025    INR 1.4 (H) 11/08/2014       Diagnostic Studies: No relevant studies.    EKG:   Results for orders placed or performed during the hospital encounter of 10/13/14   EKG 12-LEAD    Collection Time: 10/13/14  8:50 AM    Narrative    Test Reason : V72.84  Blood Pressure : mmHG  Vent. Rate : 057 BPM     Atrial Rate : 057 BPM     P-R Int : 204 ms          QRS Dur : 096 ms      QT Int : 432 ms       P-R-T Axes : 071 070 065 degrees     QTc Int : 420 ms    Sinus bradycardia  First degree AV block  Incomplete right bundle branch block  No previous ECGs available  Confirmed by Gino Jackson MD ([id number]) on 10/14/2014 11:23:15 AM    Referred By: HU NDIAYE           Confirmed By:Gino Jackson MD       2D ECHO:  TTE:  No results found for this or any previous visit.    YOKASTA:  No results found for this or any previous visit.    ASSESSMENT/PLAN:           Pre-op Assessment    I have reviewed the Patient Summary Reports.       I have reviewed the Medications.     Review of Systems  Anesthesia Hx:  No problems with previous Anesthesia   History of prior surgery of interest to airway management or planning:          Denies Family Hx of Anesthesia complications.    Denies Personal Hx of Anesthesia  complications.                    Social:  Non-Smoker       Hematology/Oncology:  Hematology Normal                     Current/Recent Cancer.                Cardiovascular:  Cardiovascular Normal                                              Pulmonary:  Pulmonary Normal                       Renal/:  Renal/ Normal                 Hepatic/GI:  Hepatic/GI Normal                    Musculoskeletal:  Arthritis               Neurological:  Neurology Normal                                      Endocrine:  Endocrine Normal            Psych:  Psychiatric Normal                    Physical Exam  General: Well nourished, Cooperative, Alert and Oriented    Airway:  Mallampati: II   Mouth Opening: Normal  TM Distance: Normal  Neck ROM: Normal ROM    Dental:  Intact    Chest/Lungs:  Normal Respiratory Rate, Clear to auscultation    Heart:  Rate: Normal  Rhythm: Regular Rhythm        Anesthesia Plan  Type of Anesthesia, risks & benefits discussed:    Anesthesia Type: Gen ETT  Intra-op Monitoring Plan: Standard ASA Monitors  Post Op Pain Control Plan: multimodal analgesia and IV/PO Opioids PRN  Induction:  IV  Airway Plan: Direct, Post-Induction  Informed Consent: Informed consent signed with the Patient and all parties understand the risks and agree with anesthesia plan.  All questions answered.   ASA Score: 3  Day of Surgery Review of History & Physical: H&P Update referred to the surgeon/provider.    Ready For Surgery From Anesthesia Perspective.     .           [1]   Patient Active Problem List  Diagnosis    Osteoarthritis of right hip    S/P RIGHT total hip arthroplasty    Hyperlipidemia    Lumbar radiculopathy    Lumbar pain    Bilateral hip pain    Osteoarthritis of left hip    History of hysteroscopy D&C   [2]   Current Facility-Administered Medications:     0.9% NaCl infusion, , Intravenous, Continuous, Tonya Farnsworth MD, Last Rate: 125 mL/hr at 04/21/25 0546, New Bag at 04/21/25 0546    clindamycin in D5W 900  mg/50 mL IVPB 900 mg, 900 mg, Intravenous, On Call Procedure **AND** gentamicin (GARAMYCIN) 282 mg in 0.9% NaCl 100 mL IVPB, 5 mg/kg (Order-Specific), Intravenous, On Call Procedure, Tonya Farnsworth MD    mupirocin 2 % ointment, , Nasal, On Call Procedure, Tonya Farnsworth MD, Given at 25 0546  [3]   Social History  Socioeconomic History    Marital status:     Years of education: 10   Tobacco Use    Smoking status: Former     Current packs/day: 0.00     Average packs/day: 0.3 packs/day for 2.1 years (0.5 ttl pk-yrs)     Types: Cigarettes     Start date: 1969     Quit date: 1971     Years since quittin.9     Passive exposure: Past    Smokeless tobacco: Never    Tobacco comments:     less than 1ppd x 3 years   Substance and Sexual Activity    Alcohol use: Yes     Alcohol/week: 5.0 standard drinks of alcohol     Types: 3 Glasses of wine, 2 Drinks containing 0.5 oz of alcohol per week     Comment: one bottle of wine on weekends    Drug use: No    Sexual activity: Yes     Partners: Male     Birth control/protection: See Surgical Hx     Social Drivers of Health     Financial Resource Strain: Low Risk  (2024)    Overall Financial Resource Strain (CARDIA)     Difficulty of Paying Living Expenses: Not hard at all   Food Insecurity: No Food Insecurity (2024)    Hunger Vital Sign     Worried About Running Out of Food in the Last Year: Never true     Ran Out of Food in the Last Year: Never true   Transportation Needs: No Transportation Needs (2023)    PRAPARE - Transportation     Lack of Transportation (Medical): No     Lack of Transportation (Non-Medical): No   Physical Activity: Unknown (2024)    Exercise Vital Sign     Days of Exercise per Week: Patient declined     Minutes of Exercise per Session: 60 min   Stress: No Stress Concern Present (2024)    [employer] - Occupational Stress Questionnaire     Feeling of Stress : Only a little    Housing Stability: Unknown (5/28/2024)    Housing Stability Vital Sign     Unable to Pay for Housing in the Last Year: No

## 2025-04-21 NOTE — PROGRESS NOTES
Dr. Enciso came by to see patient around 1230 and reassed pain. Patient is still having 10/10 pain with cramping to abdomen. Was able to get up to bathroom and void without complication. Back in bed. Flushed left arm piv and placed hub to saline lock. Will see if order for muscle relaxer was put in by Dr. Enciso.

## 2025-04-21 NOTE — PLAN OF CARE
Pt c/o 10/10 pain after admin of all PRN pain meds available. States pain comes and goes and feels like cramping. MD Enciso made aware and coming to bedside to assess.

## 2025-04-21 NOTE — ANESTHESIA PROCEDURE NOTES
Intubation    Date/Time: 4/21/2025 7:13 AM    Performed by: Monica Pierce MD  Authorized by: Christin Enciso MD    Intubation:     Induction:  Intravenous    Intubated:  Postinduction    Mask Ventilation:  Easy mask    Attempts:  2    Attempted By:  Resident anesthesiologist    Method of Intubation:  Direct    Blade:  Haddad 2    Laryngeal View Grade: Grade III - only epiglottis visible      Attempted By (2nd Attempt):  Resident anesthesiologist    Method of Intubation (2nd Attempt):  Video laryngoscopy    Blade (2nd Attempt):  Drummond 3    Laryngeal View Grade (2nd Attempt): Grade I - full view of cords      Difficult Airway Encountered?: No      Complications:  None    Airway Device:  Oral endotracheal tube    Airway Device Size:  7.0    Style/Cuff Inflation:  Cuffed    Inflation Amount (mL):  8    Tube secured:  22    Secured at:  The lips    Placement Verified By:  Capnometry    Complicating Factors:  None    Findings Post-Intubation:  BS equal bilateral and atraumatic/condition of teeth unchanged

## 2025-04-21 NOTE — OP NOTE
[facility] - Surgery ([facility])  General Surgery  Operative Note    SUMMARY     Date of Procedure: 4/21/2025     Procedure: Procedure(s) (LRB):  XI ROBOTIC HYSTERECTOMY,WITH SALPINGO-OOPHORECTOMY (Bilateral)  MAPPING, LYMPH NODE, SENTINEL (Bilateral)  BIOPSY, LYMPH NODE (Bilateral)       Surgeons and Role:     * Tonya Farnsworth MD - Primary    Assisting Surgeon: None    Pre-Operative Diagnosis: Endometrial cancer [C54.1]  PMB (postmenopausal bleeding) [N95.0]  Thickened endometrium [R93.89]    Post-Operative Diagnosis: Post-Op Diagnosis Codes:     * Endometrial cancer [C54.1]     * PMB (postmenopausal bleeding) [N95.0]     * Thickened endometrium [R93.89]    Anesthesia: General    Operative Findings (including complications, if any): 6cm uterus. Normal appearing bilateral fallopian tubes and ovaries.  +fluorescent sentinel lymph nodes bilaterally, 1 obturator and 1 external iliac on the right and 1 low common on the left. No obvious evidence on intraperitoneal disease. No ascites.      Description of Technical Procedures: The patient was taken to the Operating Room. Informed consent had been obtained.  She underwent general endotracheal anesthesia without difficulty, was prepped and draped in the normal sterile fashion in a dorsal lithotomy position.  Timeout was performed.  All parties agreed to the planned procedure. Perioperative antibiotics were administered.  Garnett catheter was placed under sterile conditions.  Two cervical injections of 1.25mg/ml ICG were performed both superficial and deep at the 3 and 9 o'clock position. A total of 4cc was used. The VCare uterine manipulator was then secured in place in a standard fashion for uterine manipulation. Gloves were changed and attention was turned to the patient's abdomen.       Veress needle was gently inserted into the abdomen. Intra-abdominal placement was confirmed with low CO2 pressure and water drop test.  Abdomen was insufflated and pneumoperitoneum was  obtained.  Skin incision was made superior to the umbilicus. Robotic trocar was introduced.  Intra-abdominal placement was confirmed.  Additional trocars were placed, 2 robotic trocars to the left of the camera, 1 robotic trocar to the right of the camera and an additional 8 mm assist port to the right of the camera.  The patient was placed in steep Trendelenburg. Robot was docked and operating surgeon reported to the console.       Survey of the abdomen and pelvis revealed the above findings. Bilateral round ligaments were identified.  These were cauterized and transected.  The posterior leaf of the broad ligament was then opened bilaterally facilitating access to the retroperitoneum. Paravesical and pararectal spaces were opened. We identified +fluorescent sentinel pelvic lymph nodes bilaterally and these were excised, location as above.      The infundibulopelvic ligaments were then skeletonized with good visualization of the ureter beneath. These were cauterized and transected.  The anterior leaf of the broad ligament was then opened circumferentially.  Proper plane for the bladder flap was identified and the bladder was gently reflected off of the anterior aspect of the uterus and cervix to the level of the cervicovaginal junction. Bilateral uterine arteries were then skeletonized.  These were cauterized and transected.  The remainder of the uterosacral and cardinal ligaments were then also serially cauterized and transected.  Posterior colpotomy was initiated and carried around circumferentially.  The uterus, cervix, bilateral fallopian tubes and ovaries were then removed through the vagina. Lymph nodes were removed through the vagina as well.        We then closed the vaginal cuff with a V-Loc suture in a running fashion. Bilateral ureters were reinspected and both noted to be vermiculating equally and briskly. Pelvis was hemostatic. A final survey was done of the abdomen and pelvis. There was no active  bleeding and the integrity of the bowel, bladder, and other visible organs and tissue was again confirmed. The robotic trocars were then removed under direct visualization and the robot was undocked. Skin incisions were then rendered hemostatic.  These were closed with 4-0 Monocryl in a subcuticular fashion. The patient tolerated the procedure well. Sponge, lap, needle and instrument counts were correct x2 as reported by the circulating nurse.  She was awakened from anesthesia and taken to recovery in stable condition.     Significant Surgical Tasks Conducted by the Assistant(s), if Applicable:  CONOR Gunter - performed expert bedside robotic assist. I certify that the services for which payment is claimed were medically necessary, and that no qualified resident was available to perform the services.     Estimated Blood Loss (EBL): <20cc    Specimens:   Specimen (24h ago, onward)       Start     Ordered    04/21/25 0948  Specimen to Pathology  RELEASE UPON ORDERING        References:    Click here for ordering Quick Tip   Question:  Release to patient  Answer:  Immediate    04/21/25 0948    04/21/25 0855  Cytology, Fluid/Wash/Hitchcock  RELEASE UPON ORDERING        Question:  Release to patient  Answer:  Immediate    04/21/25 0855                Uterus, cervix, bilateral fallopian tubes and ovaries  Right sentinel lymph nodes  Left sentinel lymph node            Condition: Good    Disposition: PACU - hemodynamically stable.    Attestation: I was present and scrubbed for the entire procedure.

## 2025-04-21 NOTE — PLAN OF CARE
Chart reviewed. Preop nursing care completed per orders. Safe surgery checklist complete aside from anesthesia consent. Type and screen/ABO drawn and sent to blood bank. Family at bedside and to take belongings. Call bell within reach. Instructed pt to call for assistance.

## 2025-04-21 NOTE — DISCHARGE SUMMARY
"Discharge Summary  Gynecology      Admit Date: 2025    Discharge Date and Time: 2025     Attending Physician: Tonya Farnsworth MD    Principal Diagnoses: S/P robot-assisted surgical procedure    Active Hospital Problems    Diagnosis  POA    *S/P RA-TLH/BSO/SLNB (25) [Z98.890]  Not Applicable      Resolved Hospital Problems   No resolved problems to display.       Procedures: Procedure(s) (LRB):  XI ROBOTIC HYSTERECTOMY,WITH SALPINGO-OOPHORECTOMY (Bilateral)  MAPPING, LYMPH NODE, SENTINEL (Bilateral)  BIOPSY, LYMPH NODE (Bilateral)    Discharged Condition: stable    Hospital Course:   Diann Wasserman is a 69 y.o. y.o.  female who presented on 2025   for above procedures for the treatment of high-grade endometrial adenocarcinoma. Patient tolerated procedure. PMH significant for HLD, OA. Post-operative course was uncomplicated.  On day of discharge, patient was urinating, ambulating, and tolerating a regular diet without difficulty. Pain was well controlled on PO medication. She was discharged home on POD#0 in stable condition with instructions to follow up with Dr. Farnsworth on 25.     Consults: None    Significant Diagnostic Studies:  No results for input(s): "WBC", "HGB", "HCT", "MCV", "PLT" in the last 168 hours.     Treatments:   1. Surgery as above    Disposition: Home or Self Care    Patient Instructions:   Current Discharge Medication List        START taking these medications    Details   oxyCODONE (ROXICODONE) 5 MG immediate release tablet Take 1 tablet (5 mg total) by mouth every 4 (four) hours as needed for Pain.  Qty: 15 tablet, Refills: 0    Associated Diagnoses: S/P robot-assisted surgical procedure      senna-docusate (SENNA WITH DOCUSATE SODIUM) 8.6-50 mg per tablet Take 2 tablets by mouth daily in the morning while taking narcotics  Qty: 30 tablet, Refills: 0    Associated Diagnoses: S/P robot-assisted surgical procedure           CONTINUE these medications which " have CHANGED    Details   acetaminophen (TYLENOL) 650 MG TbSR Take 1 tablet by mouth every 6 hours as needed. Alternate between ibuprofen and tylenol every 3 hours. For example: @0800: ibuprofen 600mg @1100: tylenol 650mg @1400: ibuprofen 600mg @1700: tylenol 650 mg @2000: ibuprofen 600mg  Qty: 30 tablet, Refills: 1    Associated Diagnoses: S/P robot-assisted surgical procedure      ibuprofen (ADVIL,MOTRIN) 600 MG tablet Take 1 tablet by mouth every 6 hours as needed. Alternate between ibuprofen and tylenol every 3 hours. For example: @0800: ibuprofen 600mg @1100: tylenol 650mg @1400: ibuprofen 600mg @1700: tylenol 650 mg @2000: ibuprofen 600mg  Qty: 30 tablet, Refills: 1    Associated Diagnoses: S/P robot-assisted surgical procedure           CONTINUE these medications which have NOT CHANGED    Details   multivit-min/iron/folic/arc829 (HAIR, SKIN AND NAILS ADVANCED ORAL) Take by mouth Daily.      multivitamin (THERAGRAN) per tablet Take 1 tablet by mouth once daily.      TURMERIC ORAL Take by mouth Daily.           STOP taking these medications       acetaminophen (TYLENOL) 500 MG tablet Comments:   Reason for Stopping:         atorvastatin (LIPITOR) 20 MG tablet Comments:   Reason for Stopping:         docusate sodium (COLACE) 100 MG capsule Comments:   Reason for Stopping:         meloxicam (MOBIC) 7.5 MG tablet Comments:   Reason for Stopping:               Discharge Procedure Orders   Diet general     Lifting restrictions   Order Comments: No lifting greater than 15 pounds for six weeks.     Other restrictions (specify):   Order Comments: PELVIC REST:  No douching, tampons, or intercourse for 6 weeks.    If prescribed, vaginal estrogen cream may be used during the postoperative period.     DRIVING:  No driving while on narcotics. Driving may be resumed initially with a competent passenger one to two weeks after surgery if no longer taking narcotics.     EXERCISE:  For six weeks your exercise should be limited  to walking. You may walk as far as you wish, as long as you increase your level of exertion gradually and avoid slippery surfaces. You may climb stairs as needed to get around, but should not use stair climbing for exercise.     Remove dressing in 24 hours   Order Comments: If you have a bandage on wound, you may remove it the day after dismissal.  If you had steri-strips remove them once they begin to peel off (usually 2 weeks). Keep incision clean and dry.  Inspect the incision daily for signs and symptoms of infection.     Wound care routine (specify)   Order Comments: WOUND CARE:  If you have a band-aid or bandage on your wound, you may remove it the day after dismissal.  If you had steri-strips remove them once they begin to peel off (usually 2 weeks).  If your steri-strips still haven't come off in 2 weeks, please remove them. You may wash the wound with mild soap and water.   You may shower at any time but should avoid immersing any abdominal incisions in water for at least two weeks after surgery or until the wound is completely healed. If given, please shower with Hibiclens soap until bottle is completely finished. Keep your wound clean and dry.  You should observe your incision for signs of infection which include redness, warmth, drainage or fever.     Call MD for:  temperature >100.4     Call MD for:  persistent nausea and vomiting     Call MD for:  severe uncontrolled pain     Call MD for:  difficulty breathing, headache or visual disturbances     Call MD for:  redness, tenderness, or signs of infection (pain, swelling, redness, odor or green/yellow discharge around incision site)     Call MD for:  hives     Call MD for:   Order Comments: inability to void,urine is ketchup colored or you have large clots, vaginal bleeding is heavier than a period.    VAGINAL DISCHARGE: You may develop a vaginal discharge and intermittent vaginal spotting after surgery and up to 6 weeks postoperatively.  The discharge  may have an odor and may change in color but it is normal.  This is due to dissolving stiches.  Contact your surgical team if you develop vaginal or vulvar irritation along with a discharge.  Also contact your surgical team if you have vaginal discharge that smells like urine or stool.    CONSTIPATION REMEDIES: Patients are often constipated after surgery or with use of oral narcotic medicine. You should continue to take the stool softener, Senokot-S during the next six weeks, and consume adequate amounts of water.  If you have not had a bowel movement for 3 days after dismissal, or are uncomfortable and unable to pass stool, please try one or all of the following measures:  1.  Milk of Magnesia - 30 cc by mouth every 12 hours   2.  Dulcolax suppository - One suppository per rectum every 4-6 hours   3.  Metamucil, Fibercon or other bulk former - use as directed  4.  Fleets Enema      PAIN MEDICATIONS:     Take your pain medications as instructed. It is best to take pain medications before your pain becomes severe. This will allow you to take less medication yet have better pain relief. For the first 2 or 3 days it may be helpful to take your pain medications on a regular schedule (e.g. every 4 to 6 hours). This will help you to keep your pain under better control. You should then begin to take fewer medications each day until you no longer need them. Do not take pain medication on an empty stomach. This may lead to nausea and vomiting.     Activity as tolerated   Order Comments: Return to normal activity slowly as you feel able.  For 6 weeks your exercise should be limited to walking.  You may walk as far as you wish, as long as you increase your level of exertion gradually and avoid slippery surfaces.    If you had a hysterectomy at the surgery do not insert anything in your vagina for 9 weeks.     Shower on day dressing removed (No bath)   Order Comments: You may shower at any time but should avoid immersing any  abdominal incisions in water for at least 2 weeks after surgery or until the wound is completely healed.  If given, please shower with Hibaclens soap until bottle is completely finish        Follow-up Information       Tonya Farnsworth MD Follow up on 5/13/2025.    Specialty: Gynecologic Oncology  Why: Post-op  Contact information:  [address]  [facility] [address]  [phone]                             Monica Kahn MD  Obstetrics & Gynecology, PGY-1

## 2025-04-21 NOTE — PROGRESS NOTES
Robaxin given po. Reassessed sites to abdomen. Dressings are clean dry and intact. On 2L O2. Family at bedside.

## 2025-04-21 NOTE — ANESTHESIA POSTPROCEDURE EVALUATION
Anesthesia Post Evaluation    Patient: Diann Wasserman    Procedure(s) Performed: Procedure(s) (LRB):  XI ROBOTIC HYSTERECTOMY,WITH SALPINGO-OOPHORECTOMY (Bilateral)  MAPPING, LYMPH NODE, SENTINEL (Bilateral)  BIOPSY, LYMPH NODE (Bilateral)    Final Anesthesia Type: general      Patient location during evaluation: [facility]  Patient participation: Yes- Able to Participate  Level of consciousness: awake and alert  Post-procedure vital signs: reviewed and stable  Pain management: adequate  Airway patency: patent  BALAJI mitigation strategies: Multimodal analgesia  PONV status at discharge: No PONV  Anesthetic complications: no      Cardiovascular status: blood pressure returned to baseline, hemodynamically stable and stable  Respiratory status: unassisted, room air and spontaneous ventilation  Hydration status: euvolemic  Follow-up not needed.              Vitals Value Taken Time   /80 04/21/25 11:30   Temp 36.8 °C (98.3 °F) 04/21/25 10:46   Pulse 81 04/21/25 11:30   Resp 20 04/21/25 11:30   SpO2 99 % 04/21/25 11:30         No case tracking events are documented in the log.      Pain/Vinh Score: Pain Rating Prior to Med Admin: 10 (4/21/2025 11:46 AM)  Vinh Score: 10 (4/21/2025 11:15 AM)

## 2025-04-21 NOTE — INTERVAL H&P NOTE
The patient has been examined and the H&P has been reviewed:    I concur with the findings and no changes have occurred since H&P was written.    Surgery risks, benefits and alternative options discussed and understood by patient/family.    Diann Wasserman is 69 y.o.  presenting for scheduled RA-TLH/BSO/SLNB for the treatment of high grade endometrial cancer.    Temp:  [98.6 °F (37 °C)] 98.6 °F (37 °C)  Pulse:  [72-75] 75  Resp:  [18] 18  SpO2:  [99 %] 99 %  BP: (144)/(70) 144/70    General: NAD, alert, oriented, cooperative  HEENT: NCAT, EOM grossly intact  Lungs: Normal WOB  Heart: regular rate  Abdomen: soft, nondistended, nontender, no rebound or guarding    Consents in chart. Pre-operative heparin given at 0546 . All questions answered and concerns addressed. To OR for planned procedure.

## 2025-04-21 NOTE — TRANSFER OF CARE
"Anesthesia Transfer of Care Note    Patient: Diann Wasserman    Procedure(s) Performed: Procedure(s) (LRB):  XI ROBOTIC HYSTERECTOMY,WITH SALPINGO-OOPHORECTOMY (Bilateral)  MAPPING, LYMPH NODE, SENTINEL (Bilateral)  BIOPSY, LYMPH NODE (Bilateral)    Patient location: [facility]    Anesthesia Type: general    Transport from OR: Transported from OR on 6-10 L/min O2 by face mask with adequate spontaneous ventilation    Post pain: adequate analgesia    Post assessment: no apparent anesthetic complications    Post vital signs: stable    Level of consciousness: awake and alert    Nausea/Vomiting: no nausea/vomiting    Complications: none    Transfer of care protocol was followed      Last vitals: Visit Vitals  BP (!) 144/70 (BP Location: Left arm, Patient Position: Lying)   Pulse 75   Temp 37 °C (98.6 °F) (Temporal)   Resp 18   Ht 5' 2" (1.575 m)   Wt 61.2 kg (135 lb)   SpO2 99%   Breastfeeding No   BMI 24.69 kg/m²     "

## 2025-04-22 ENCOUNTER — PATIENT MESSAGE (OUTPATIENT)
Dept: GYNECOLOGIC ONCOLOGY | Facility: CLINIC | Age: 70
End: 2025-04-22
Payer: MEDICARE

## 2025-04-22 ENCOUNTER — TELEPHONE (OUTPATIENT)
Dept: GYNECOLOGIC ONCOLOGY | Facility: CLINIC | Age: 70
End: 2025-04-22
Payer: MEDICARE

## 2025-04-22 VITALS
SYSTOLIC BLOOD PRESSURE: 173 MMHG | BODY MASS INDEX: 24.84 KG/M2 | DIASTOLIC BLOOD PRESSURE: 70 MMHG | WEIGHT: 135 LBS | TEMPERATURE: 98 F | RESPIRATION RATE: 16 BRPM | HEART RATE: 85 BPM | HEIGHT: 62 IN | OXYGEN SATURATION: 95 %

## 2025-04-22 LAB
ABSOLUTE EOSINOPHIL (OHS): 0 K/UL
ABSOLUTE MONOCYTE (OHS): 0.62 K/UL (ref 0.3–1)
ABSOLUTE NEUTROPHIL COUNT (OHS): 7.44 K/UL (ref 1.8–7.7)
ALBUMIN SERPL BCP-MCNC: 4.3 G/DL (ref 3.5–5.2)
ALP SERPL-CCNC: 75 UNIT/L (ref 40–150)
ALT SERPL W/O P-5'-P-CCNC: 19 UNIT/L (ref 10–44)
ANION GAP (OHS): 13 MMOL/L (ref 8–16)
AST SERPL-CCNC: 18 UNIT/L (ref 11–45)
BASOPHILS # BLD AUTO: 0.02 K/UL
BASOPHILS NFR BLD AUTO: 0.2 %
BILIRUB SERPL-MCNC: 0.4 MG/DL (ref 0.1–1)
BUN SERPL-MCNC: 8 MG/DL (ref 8–23)
CALCIUM SERPL-MCNC: 9.6 MG/DL (ref 8.7–10.5)
CHLORIDE SERPL-SCNC: 103 MMOL/L (ref 95–110)
CO2 SERPL-SCNC: 21 MMOL/L (ref 23–29)
CREAT SERPL-MCNC: 0.7 MG/DL (ref 0.5–1.4)
ERYTHROCYTE [DISTWIDTH] IN BLOOD BY AUTOMATED COUNT: 11.9 % (ref 11.5–14.5)
GFR SERPLBLD CREATININE-BSD FMLA CKD-EPI: >60 ML/MIN/1.73/M2
GLUCOSE SERPL-MCNC: 118 MG/DL (ref 70–110)
HCT VFR BLD AUTO: 36.7 % (ref 37–48.5)
HCV AB SERPL QL IA: NORMAL
HGB BLD-MCNC: 12.1 GM/DL (ref 12–16)
HIV 1+2 AB+HIV1 P24 AG SERPL QL IA: NORMAL
IMM GRANULOCYTES # BLD AUTO: 0.02 K/UL (ref 0–0.04)
IMM GRANULOCYTES NFR BLD AUTO: 0.2 % (ref 0–0.5)
LYMPHOCYTES # BLD AUTO: 1.03 K/UL (ref 1–4.8)
MCH RBC QN AUTO: 28.8 PG (ref 27–31)
MCHC RBC AUTO-ENTMCNC: 33 G/DL (ref 32–36)
MCV RBC AUTO: 87 FL (ref 82–98)
NUCLEATED RBC (/100WBC) (OHS): 0 /100 WBC
PLATELET # BLD AUTO: 301 K/UL (ref 150–450)
PMV BLD AUTO: 9.8 FL (ref 9.2–12.9)
POTASSIUM SERPL-SCNC: 3.7 MMOL/L (ref 3.5–5.1)
PROT SERPL-MCNC: 7.9 GM/DL (ref 6–8.4)
RBC # BLD AUTO: 4.2 M/UL (ref 4–5.4)
RELATIVE EOSINOPHIL (OHS): 0 %
RELATIVE LYMPHOCYTE (OHS): 11.3 % (ref 18–48)
RELATIVE MONOCYTE (OHS): 6.8 % (ref 4–15)
RELATIVE NEUTROPHIL (OHS): 81.5 % (ref 38–73)
SODIUM SERPL-SCNC: 137 MMOL/L (ref 136–145)
WBC # BLD AUTO: 9.13 K/UL (ref 3.9–12.7)

## 2025-04-22 PROCEDURE — 85025 COMPLETE CBC W/AUTO DIFF WBC: CPT

## 2025-04-22 PROCEDURE — 96361 HYDRATE IV INFUSION ADD-ON: CPT

## 2025-04-22 PROCEDURE — 80053 COMPREHEN METABOLIC PANEL: CPT

## 2025-04-22 PROCEDURE — 87389 HIV-1 AG W/HIV-1&-2 AB AG IA: CPT | Performed by: PHYSICIAN ASSISTANT

## 2025-04-22 PROCEDURE — 86803 HEPATITIS C AB TEST: CPT | Performed by: PHYSICIAN ASSISTANT

## 2025-04-22 PROCEDURE — 96376 TX/PRO/DX INJ SAME DRUG ADON: CPT

## 2025-04-22 PROCEDURE — 96365 THER/PROPH/DIAG IV INF INIT: CPT

## 2025-04-22 PROCEDURE — 63600175 PHARM REV CODE 636 W HCPCS

## 2025-04-22 PROCEDURE — 25000003 PHARM REV CODE 250

## 2025-04-22 RX ORDER — OXYCODONE HYDROCHLORIDE 5 MG/1
5 TABLET ORAL
Refills: 0 | Status: DISCONTINUED | OUTPATIENT
Start: 2025-04-22 | End: 2025-04-22

## 2025-04-22 RX ORDER — MORPHINE SULFATE 4 MG/ML
4 INJECTION, SOLUTION INTRAMUSCULAR; INTRAVENOUS
Refills: 0 | Status: COMPLETED | OUTPATIENT
Start: 2025-04-22 | End: 2025-04-22

## 2025-04-22 RX ORDER — PROMETHAZINE HYDROCHLORIDE 25 MG/1
25 SUPPOSITORY RECTAL EVERY 6 HOURS PRN
Qty: 5 SUPPOSITORY | Refills: 0 | Status: SHIPPED | OUTPATIENT
Start: 2025-04-22

## 2025-04-22 RX ORDER — HYDROMORPHONE HYDROCHLORIDE 1 MG/ML
1 INJECTION, SOLUTION INTRAMUSCULAR; INTRAVENOUS; SUBCUTANEOUS
Refills: 0 | Status: DISCONTINUED | OUTPATIENT
Start: 2025-04-22 | End: 2025-04-22

## 2025-04-22 RX ORDER — PROMETHAZINE HYDROCHLORIDE 12.5 MG/1
12.5 TABLET ORAL EVERY 6 HOURS PRN
Qty: 15 TABLET | Refills: 0 | Status: SHIPPED | OUTPATIENT
Start: 2025-04-22

## 2025-04-22 RX ORDER — OXYCODONE HYDROCHLORIDE 5 MG/1
10 TABLET ORAL
Refills: 0 | Status: COMPLETED | OUTPATIENT
Start: 2025-04-22 | End: 2025-04-22

## 2025-04-22 RX ADMIN — SODIUM CHLORIDE 1000 ML: 9 INJECTION, SOLUTION INTRAVENOUS at 12:04

## 2025-04-22 RX ADMIN — MORPHINE SULFATE 4 MG: 4 INJECTION INTRAVENOUS at 01:04

## 2025-04-22 RX ADMIN — PROMETHAZINE HYDROCHLORIDE 12.5 MG: 25 INJECTION INTRAMUSCULAR; INTRAVENOUS at 03:04

## 2025-04-22 RX ADMIN — OXYCODONE 10 MG: 5 TABLET ORAL at 03:04

## 2025-04-22 NOTE — TELEPHONE ENCOUNTER
Pt's daughter, Miya, reports she is calling back at her father's request to let us know that pt is coming to the ED now, as she has started vomiting again.    Reason for Disposition   [1] Follow-up call to recent contact AND [2] information only call, no triage required    Protocols used: Information Only Call - No Triage-A-

## 2025-04-22 NOTE — ED PROVIDER NOTES
Encounter Date: 4/21/2025       History     Chief Complaint   Patient presents with    Post-op Problem     Pt recently had hysterectomy done this AM. Pt states around 4pm today she started to feel n/v, lightheaded, and clammy. Pt unable to tolerate anything PO. Pt also endorsing lower abdominal pain rx medication w/o relief.      69 y.o. female with a PMH of high-grade endometrial adenocarcinoma s/p robotic vaginal total hysterectomy this morning presents to [facility] for evaluation of severe diffuse lower abdominal pain, nausea, and multiple episodes of nonbloody nonbilious emesis. She reports around 4pm she began vomiting the pain medication (oxycodone) that she was prescribed at discharge, and she has not been able to keep any down. She then developed pain diffusely across her lower abdomen. Denies any other complaints including fever, chills, chest pain, dyspnea, diarrhea, blood in her stool, vaginal bleeding, dysuria, or urinary retention.         The history is provided by the patient, medical records and the spouse.     Review of patient's allergies indicates:   Allergen Reactions    Erythromycin Rash    Penicillins Rash and Other (See Comments)     Redness     Past Medical History:   Diagnosis Date    Arthritis     Cataract     Iris nevus, left     Right hip pain      Past Surgical History:   Procedure Laterality Date    BREAST SURGERY Bilateral     breast lift    CHOLECYSTECTOMY  2012    COLONOSCOPY N/A 04/06/2016    Procedure: COLONOSCOPY;  Surgeon: Luis Bogran-Reyes, MD;  Location: [facility];  Service: Endoscopy;  Laterality: N/A;    COSMETIC SURGERY  1985    bilateral breast lift    HIP SURGERY  11/2014    right total    HYSTEROSCOPY WITH DILATION AND CURETTAGE OF UTERUS N/A 3/28/2025    Procedure: HYSTEROSCOPY, WITH DILATION AND CURETTAGE OF UTERUS;  Surgeon: Samira Johnson MD;  Location: [facility];  Service: OB/GYN;  Laterality: N/A;    JOINT REPLACEMENT      LYMPH NODE BIOPSY  Bilateral 4/21/2025    Procedure: BIOPSY, LYMPH NODE;  Surgeon: Tonya Farnsworth MD;  [facility];  Service: OB/GYN;  Laterality: Bilateral;    MAPPING, LYMPH NODE, SENTINEL Bilateral 4/21/2025    Procedure: MAPPING, LYMPH NODE, SENTINEL;  Surgeon: Tonya Farnsworth MD;  [facility];  Service: OB/GYN;  Laterality: Bilateral;    TOTAL REDUCTION MAMMOPLASTY      LIft    TUBAL LIGATION      XI ROBOTIC HYSTERECTOMY, WITH SALPINGO-OOPHORECTOMY Bilateral 4/21/2025    Procedure: XI ROBOTIC HYSTERECTOMY,WITH SALPINGO-OOPHORECTOMY;  Surgeon: Tonya Farnsworth MD;  [facility];  Service: OB/GYN;  Laterality: Bilateral;  2.5 hr case     Family History   Problem Relation Name Age of Onset    Hypothyroidism Mother      Hypertension Father buzz     Aortic aneurysm Father buzz     Stroke Father buzz     No Known Problems Sister      No Known Problems Brother      Breast cancer Maternal Aunt  40    Breast cancer Cousin Annmarie      Social History[1]  Review of Systems    Physical Exam     Initial Vitals [04/21/25 2206]   BP Pulse Resp Temp SpO2   (!) 184/85 101 18 97.4 °F (36.3 °C) 99 %      MAP       --         Physical Exam    Nursing note and vitals reviewed.  Constitutional: She appears well-developed and well-nourished. No distress.   HENT:   Head: Normocephalic. Mouth/Throat: Oropharynx is clear and moist.   Eyes: Pupils are equal, round, and reactive to light. No scleral icterus.   Neck: Neck supple.   Cardiovascular:  Regular rhythm.   Tachycardia present.         Pulmonary/Chest: Breath sounds normal. No stridor. No respiratory distress.   Abdominal: Abdomen is soft. She exhibits no distension. There is abdominal tenderness (Moderate diffuse lower abdominal pain without rebound or guarding).   Laparoscopic incisions clean, dry, and intact without drainage or dehiscence   Musculoskeletal:         General: No edema.      Cervical back: Neck supple.     Neurological: She is  alert. GCS score is 15. GCS eye subscore is 4. GCS verbal subscore is 5. GCS motor subscore is 6.   Skin: Skin is warm and dry.   Psychiatric: She has a normal mood and affect.         ED Course   Procedures  Labs Reviewed   COMPREHENSIVE METABOLIC PANEL - Abnormal       Result Value    Sodium 137      Potassium 3.7      Chloride 103      CO2 21 (*)     Glucose 118 (*)     BUN 8      Creatinine 0.7      Calcium 9.6      Protein Total 7.9      Albumin 4.3      Bilirubin Total 0.4      ALP 75      AST 18      ALT 19      Anion Gap 13      eGFR >60     CBC WITH DIFFERENTIAL - Abnormal    WBC 9.13      RBC 4.20      HGB 12.1      HCT 36.7 (*)     MCV 87      MCH 28.8      MCHC 33.0      RDW 11.9      Platelet Count 301      MPV 9.8      Nucleated RBC 0      Neut % 81.5 (*)     Lymph % 11.3 (*)     Mono % 6.8      Eos % 0.0      Basophil % 0.2      Imm Grans % 0.2      Neut # 7.44      Lymph # 1.03      Mono # 0.62      Eos # 0.00      Baso # 0.02      Imm Grans # 0.02     HEPATITIS C ANTIBODY - Normal    Hep C Ab Interp Non-Reactive     HIV 1 / 2 ANTIBODY - Normal    HIV 1/2 Ag/Ab Non-Reactive     CBC W/ AUTO DIFFERENTIAL    Narrative:     The following orders were created for panel order CBC auto differential.  Procedure                               Abnormality         Status                     ---------                               -----------         ------                     CBC with Differential[[id number]]       Abnormal            Final result                 Please view results for these tests on the individual orders.   HEP C VIRUS HOLD SPECIMEN          Imaging Results    None          Medications   morphine injection 4 mg (4 mg Intravenous Given 4/21/25 1987)   ondansetron injection 4 mg (4 mg Intravenous Given 4/21/25 1967)   sodium chloride 0.9% bolus 1,000 mL 1,000 mL (0 mLs Intravenous Stopped 4/22/25 0123)   morphine injection 4 mg (4 mg Intravenous Given 4/22/25 0136)   promethazine (PHENERGAN) 12.5  mg in 0.9% NaCl 50 mL IVPB (0 mg Intravenous Stopped 4/22/25 7400)   oxyCODONE immediate release tablet 10 mg (10 mg Oral Given 4/22/25 0243)     Medical Decision Making  69-year-old female presents to the ED for evaluation of postoperative pain, nausea, and vomiting.    Patient is afebrile, hemodynamically stable, and in no acute distress on arrival.   Differential diagnoses considered include, but not limited to:  Expected postoperative symptoms, postoperative complication, ileus     Patient is given morphine, Zofran, and 1 L IV fluid bolus.  She continued to have significant pain and was given another dose of morphine.  Consulted and discussed with gynecology oncology resident who evaluated the patient emergently at bedside and recommends Phenergan, p.o. oxycodone, and p.o. challenge.  Patient was given phenergan, oxycodone, and was p.o. challenged with water and crackers following this.  On re-evaluation patient reports she is feeling much better and symptoms have all resolved.  I discussed with her and her  at bedside that I suspect her symptoms are due to expected postoperative course, and do not think that she needs CT imaging or admission for observation at this time and they are in agreement with this. She is comfortable with plan for discharge home and follow-up in Gynecology Oncology Clinic.  I discussed with her that I have sent prescriptions for both p.o. Phenergan and suppository Phenergan in the event she is unable to tolerate p.o. to the pharmacy here for her to  prior to going home.  I additionally discussed that she should continue the pain medications she was prescribed after surgery.  Discussed plan for discharge home, follow-up in clinic, supportive care, and strict return precautions and patient expressed understanding and agreement.    Amount and/or Complexity of Data Reviewed  Labs: ordered.    Risk  Prescription drug management.  Decision regarding hospitalization.                                       Clinical Impression:  Final diagnoses:  [G89.18] Post-operative pain (Primary)  [R11.2, Z98.890] Postoperative nausea and vomiting          ED Disposition Condition    Discharge Stable          ED Prescriptions       Medication Sig Dispense Start Date End Date Auth. Provider    promethazine (PHENERGAN) 12.5 MG Tab Take 1 tablet (12.5 mg total) by mouth every 6 (six) hours as needed (nausea and vomiting). 15 tablet 2025 -- Becca Ellington MD    promethazine (PHENERGAN) 25 MG suppository Place 1 suppository (25 mg total) rectally every 6 (six) hours as needed for Nausea. 5 suppository 2025 -- Becca Ellington MD          Follow-up Information       Follow up With Specialties Details Why Contact Info    Tonya Farnsworth MD Gynecologic Oncology   [address] [place] [id number]  [phone]                   [1]   Social History  Tobacco Use    Smoking status: Former     Current packs/day: 0.00     Average packs/day: 0.3 packs/day for 2.1 years (0.5 ttl pk-yrs)     Types: Cigarettes     Start date: 1969     Quit date: 1971     Years since quittin.0     Passive exposure: Past    Smokeless tobacco: Never    Tobacco comments:     less than 1ppd x 3 years   Substance Use Topics    Alcohol use: Yes     Alcohol/week: 5.0 standard drinks of alcohol     Types: 3 Glasses of wine, 2 Drinks containing 0.5 oz of alcohol per week     Comment: one bottle of wine on weekends    Drug use: No        Becca Ellington MD  Resident  25 [phone]

## 2025-04-22 NOTE — TELEPHONE ENCOUNTER
Pt's daughter reports pt had a hysterectomy done today, but has been vomiting and having severe lower abd pain (also in her pelvic and back area), since 1600, Pt took her oxycodone at 1500 and at 1900, but vomited within 10 minutes of taking it, has tried an older prescription for zofran and crackers, but vomited shortly after taking them, did just take some phenergan pills she found at home about 15 minutes ago and trying to not vomit it back up, but only has one more phenergan pill left. Pt advised to go to the ED now (or PCP triage) per protocol, [facility] GYN ONC On Call resident/MD paged at 2052, 2nd page sent at 2105 to the resident pager. Call placed to staff on call at 2115, Dr. Alberts advised that if pt continues to vomit after the phenergan or have severe pain she will need to be seen in the ED, otherwise will have an antiemetic prescription sent to pt's pharmacy for her to  tomorrow. Pt/ informed and was encouraged to call back with any worsening symptoms or questions. They verbalized understanding.      Reason for Disposition   [1] Vomiting AND [2] persists > 4 hours    Additional Information   Negative: Sounds like a life-threatening emergency to the triager   Negative: [1] Widespread rash AND [2] bright red, sunburn-like   Negative: [1] SEVERE headache AND [2] after spinal (epidural) anesthesia    Protocols used: Post-Op Symptoms and [id number]

## 2025-04-22 NOTE — DISCHARGE INSTRUCTIONS
Thank you for coming to [facility] today.  It was a pleasure taking care of you.  Please follow-up with your surgeon in clinic for evaluation.  Two prescriptions have been sent to the pharmacy for you including oral Phenergan and phenergan suppositories.  Please only use 1 of these at a time.

## 2025-04-22 NOTE — CONSULTS
[facility] - Emergency Dept  Gynecologic Oncology  Consult Note    Patient Name: Diann Wasserman  MRN: [id number]  Admission Date: 2025  Hospital Length of Stay: 0 days  Attending Provider: Nanci Washington MD  Primary Care Provider: Virginia Greco MD  Principal Problem: <principal problem not specified>     Inpatient consult to Gynecologic Oncology  Consult performed by: Masha Jefferson MD  Consult ordered by: Becca Ellington MD        Subjective:     Chief Complaint: abdominal pain, N/V    History of Present Illness:   69 y.o.  with h/o high grade endometrial adenocarcinoma now POD#1 s/p RA-TLH/BSO/SLNB presented to the ED yesterday evening d/t uncontrolled pain and N/V at home. Reports she was stable at time of discharge, with pain controlled via IV meds and did not have any N/V prior to leaving the hospital. She says she voided before leaving but small volume. At home, she attempted to take PO pain meds and was unable to tolerate. Reports emesis with pills, water, crackers. Thus her pain became worse an reached 6/10. She also had emesis immediately after taking phenergan PO. Presented to ED for care.     Since being in ED, she has had 2 doses of 4mg IV morphine. The last dose was given 2 hours prior to my assessment.     On assessment, patient reports feeling significantly improved. She has not had any N/V since being in ED. Reports her current pain is 2/10, mainly back pain from the ED stretcher, minimal abdominal discomfort. Describes the initial worsening pain at home as lower abdominal and suprapubic. States she was unable to void at home as well. Sine being in ED, she has PureWic catheter in place with 600cc clear yellow urine in canister. Patient reports significant improvement of pain now that she has been voiding. Minimal pain at trocar sites, denies bleeding or oozing from incisions. Denies vaginal bleeding. Has not passed gas or had BM. Denies f/c, CP, SOB, HA, dizziness,  syncope.       Oncology History:   Of note: 2019 thickened endometrium, EMBx negative    2/27/2025 reported PMB. EMB: No endometrial tissue identified     3/28/2025 taken to OR for hysteroscopy D&C  EMC:  high-grade P 53 mutated endometrial adenocarcinoma.  P16 is positive.  P 53 is negative (mutated).  Amacr is focally positive in tumor cells.  ER is weakly positive.  MMR p  Note:  The differential diagnoses include serous carcinoma and endometrioid carcinoma with metaplastic features.  Also, but less likely, is clear cell carcinoma     4/11/2025  is 15. CT C/A/P negative for metastatic disease    4/21/2025 RA-TLH/BSO/SLNB       Past Medical History:   Diagnosis Date    Arthritis     Cataract     Iris nevus, left     Right hip pain      Past Surgical History:   Procedure Laterality Date    BREAST SURGERY Bilateral     breast lift    CHOLECYSTECTOMY  2012    COLONOSCOPY N/A 04/06/2016    Procedure: COLONOSCOPY;  Surgeon: Luis Bogran-Reyes, MD;  Location: [facility];  Service: Endoscopy;  Laterality: N/A;    COSMETIC SURGERY  1985    bilateral breast lift    HIP SURGERY  11/2014    right total    HYSTEROSCOPY WITH DILATION AND CURETTAGE OF UTERUS N/A 3/28/2025    Procedure: HYSTEROSCOPY, WITH DILATION AND CURETTAGE OF UTERUS;  Surgeon: Samira Johnson MD;  Location: [facility];  Service: OB/GYN;  Laterality: N/A;    JOINT REPLACEMENT      LYMPH NODE BIOPSY Bilateral 4/21/2025    Procedure: BIOPSY, LYMPH NODE;  Surgeon: Tonya Farnsworth MD;  Location: [address];  Service: OB/GYN;  Laterality: Bilateral;    MAPPING, LYMPH NODE, SENTINEL Bilateral 4/21/2025    Procedure: MAPPING, LYMPH NODE, SENTINEL;  Surgeon: Tonya Farnsworth MD;  Location: [address];  Service: OB/GYN;  Laterality: Bilateral;    TOTAL REDUCTION MAMMOPLASTY      LIft    TUBAL LIGATION      XI ROBOTIC HYSTERECTOMY, WITH SALPINGO-OOPHORECTOMY Bilateral 4/21/2025    Procedure: XI ROBOTIC HYSTERECTOMY,WITH SALPINGO-OOPHORECTOMY;   Surgeon: Tonya Farnsworth MD;  Location: [facility] OR [address] [place] [address];  Service: OB/GYN;  Laterality: Bilateral;  2.5 hr case     Family History       Problem Relation (Age of Onset)    Aortic aneurysm Father    Breast cancer Maternal Aunt (40), Cousin    Hypertension Father    Hypothyroidism Mother    No Known Problems Sister, Brother    Stroke Father          Tobacco Use    Smoking status: Former     Current packs/day: 0.00     Average packs/day: 0.3 packs/day for 2.1 years (0.5 ttl pk-yrs)     Types: Cigarettes     Start date: 1969     Quit date: 1971     Years since quittin.0     Passive exposure: Past    Smokeless tobacco: Never    Tobacco comments:     less than 1ppd x 3 years   Substance and Sexual Activity    Alcohol use: Yes     Alcohol/week: 5.0 standard drinks of alcohol     Types: 3 Glasses of wine, 2 Drinks containing 0.5 oz of alcohol per week     Comment: one bottle of wine on weekends    Drug use: No    Sexual activity: Yes     Partners: Male     Birth control/protection: See Surgical Hx       (Not in a hospital admission)      Review of patient's allergies indicates:   Allergen Reactions    Erythromycin Rash    Penicillins Rash and Other (See Comments)     Redness         Objective:     Vital Signs (Most Recent):  Temp: 97.4 °F (36.3 °C) (25 2206)  Pulse: 91 (25 0355)  Resp: 14 (25 0355)  BP: (!) 163/70 (25 0332)  SpO2: 95 % (25 0355) Vital Signs (24h Range):  Temp:  [97.4 °F (36.3 °C)-98.6 °F (37 °C)] 97.4 °F (36.3 °C)  Pulse:  [] 91  Resp:  [11-22] 14  SpO2:  [91 %-100 %] 95 %  BP: (144-192)/(68-92) 163/70     Weight: 61.2 kg (135 lb)  Body mass index is 24.69 kg/m².    Physical Exam:   Constitutional: She is oriented to person, place, and time. She appears well-developed and well-nourished. No distress.    HENT:   Head: Normocephalic and atraumatic.    Eyes: EOM are normal.     Cardiovascular:  Normal rate, regular rhythm and normal heart sounds.            No  murmur heard.   Pulmonary/Chest: Effort normal and breath sounds normal. No respiratory distress. She has no wheezes. She has no rales. She exhibits no tenderness.        Abdominal: Soft. She exhibits abdominal incision (lsc port site incisions dressed with steri-strips and bandaids, scant dried blood on bandaids). She exhibits no distension. There is abdominal tenderness (minimal tenderness to palpation, appropriate for POD#1). There is no rebound and no guarding.     Genitourinary:    Genitourinary Comments: PureWic in place, 600cc of clear yellow urine in canister             Musculoskeletal: Moves all extremeties. No tenderness.       Neurological: She is alert and oriented to person, place, and time.    Skin: Skin is warm and dry.    Psychiatric: She has a normal mood and affect. Her behavior is normal. Judgment and thought content normal.       Laboratory:  Recent Labs   Lab 04/22/25  0001   WBC 9.13   RBC 4.20   HGB 12.1   HCT 36.7*      MCV 87   MCH 28.8   MCHC 33.0     Recent Labs   Lab 04/22/25  0001      K 3.7      CO2 21*   BUN 8   CREATININE 0.7   BILITOT 0.4   ALKPHOS 75   ALT 19   AST 18           Assessment/Plan:     Post-op pain  - VSS, no acute distress  - patient reporting significant improvement of pain with most relief after voiding 600cc, pain now 2/10 with last dose of IV morphine >2h ago; no N/V since being in ED  - exam with minimal appropriate abdominal pain for post-op status, incisions dressed with no concerns  - CBC and CMP WNL  - clinically stable with non-acute abdomen and improvement of pain at this time  - Recommend pre-medicating with phenergan IV and PO challenging with water/crackers as well as oxy IR 5mg. If patient tolerates, stable for discharge and recommend rx for phenergan suppositories for use at home. If does not tolerate PO challenge, will admit for pain control and supportive care. Plan discussed with ED provider, will follow up.      Masha Jefferson MD    OB/GYN PGY-3

## 2025-04-22 NOTE — ED NOTES
Patient identifiers for Diann Wasserman 69 y.o. female checked and correct.  Chief Complaint   Patient presents with    Post-op Problem     Pt recently had hysterectomy done this AM. Pt states around 4pm today she started to feel n/v, lightheaded, and clammy. Pt unable to tolerate anything PO. Pt also endorsing lower abdominal pain rx medication w/o relief.      Past Medical History:   Diagnosis Date    Arthritis     Cataract     Iris nevus, left     Right hip pain      Allergies reported:   Review of patient's allergies indicates:   Allergen Reactions    Erythromycin Rash    Penicillins Rash and Other (See Comments)     Redness

## 2025-04-23 NOTE — OPERATIVE NOTE ADDENDUM
Certification of Assistant at Surgery       Surgery Date: 4/21/2025     Participating Surgeons:  Surgeons and Role:     * Tonya Farnsworth MD - Primary    Procedures:  Procedure(s) (LRB):  XI ROBOTIC HYSTERECTOMY,WITH SALPINGO-OOPHORECTOMY (Bilateral)  MAPPING, LYMPH NODE, SENTINEL (Bilateral)  BIOPSY, LYMPH NODE (Bilateral)    Assistant Surgeon's Certification of Necessity:  I understand that section 1842 (b) (6) (d) of the Social Security Act generally prohibits Medicare Part B reasonable charge payment for the services of assistants at surgery in teaching hospitals when qualified residents are available to furnish such services. I certify that the services for which payment is claimed were medically necessary, and that no qualified resident was available to perform the services. I further understand that these services are subject to post-payment review by the Medicare carrier.      CONOR Nair    04/23/2025  9:49 AM

## 2025-04-24 ENCOUNTER — PATIENT OUTREACH (OUTPATIENT)
Dept: ADMINISTRATIVE | Facility: CLINIC | Age: 70
End: 2025-04-24
Payer: MEDICARE

## 2025-04-24 LAB
ESTROGEN SERPL-MCNC: NORMAL PG/ML
INSULIN SERPL-ACNC: NORMAL U[IU]/ML
LAB AP CLINICAL INFORMATION: NORMAL
LAB AP GROSS DESCRIPTION: NORMAL
LAB AP NON-GYN INTERPRETATION SPECIMEN 1: NORMAL
LAB AP PERFORMING LOCATION(S): NORMAL
LAB AP REPORT FOOTNOTES: NORMAL
T3RU NFR SERPL: NORMAL %

## 2025-04-24 NOTE — PROGRESS NOTES
C3 nurse attempted to contact Diann Wasserman for a TCC post hospital discharge follow up call. No answer. LVM requesting a callback at [phone].    The patient does not have a scheduled HOSFU appointment. Message sent to PCP's staff to assist with HOSFU appointment scheduling.

## 2025-04-25 ENCOUNTER — PATIENT MESSAGE (OUTPATIENT)
Dept: GYNECOLOGIC ONCOLOGY | Facility: CLINIC | Age: 70
End: 2025-04-25
Payer: MEDICARE

## 2025-04-25 NOTE — PROGRESS NOTES
C3 nurse spoke with Diann Wasserman for a TCC post hospital discharge follow up call. The patient does not have a scheduled HOSFU appointment with her PCP, Virginia Greco MD within 5-7 days post hospital discharge date of 4/24/25. Pt denies needing a HOSFU with her PCP or a NPHV at this time. No messages routed.

## 2025-04-29 ENCOUNTER — PATIENT MESSAGE (OUTPATIENT)
Dept: GYNECOLOGIC ONCOLOGY | Facility: CLINIC | Age: 70
End: 2025-04-29
Payer: MEDICARE

## 2025-04-29 LAB
DHEA SERPL-MCNC: ABNORMAL
ESTROGEN SERPL-MCNC: ABNORMAL PG/ML
INSULIN SERPL-ACNC: ABNORMAL U[IU]/ML
LAB AP CLINICAL INFORMATION: ABNORMAL
LAB AP DIAGNOSIS CATEGORY: ABNORMAL
LAB AP GROSS DESCRIPTION: ABNORMAL
LAB AP PERFORMING LOCATION(S): ABNORMAL
LAB AP REPORT FOOTNOTES: ABNORMAL
LAB AP SYNOPTIC CHECKLIST: ABNORMAL
T3RU NFR SERPL: ABNORMAL %

## 2025-05-01 DIAGNOSIS — C54.1 ENDOMETRIAL ADENOCARCINOMA: Primary | ICD-10-CM

## 2025-05-06 ENCOUNTER — TELEPHONE (OUTPATIENT)
Dept: GYNECOLOGIC ONCOLOGY | Facility: CLINIC | Age: 70
End: 2025-05-06
Payer: MEDICARE

## 2025-05-06 NOTE — TELEPHONE ENCOUNTER
Called to check on patient following surgery. Doing well no complaints. Reviewed pathology.    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

## 2025-05-12 NOTE — PROGRESS NOTES
Subjective     Patient ID: Diann Wasserman is a 69 y.o. female.    Chief Complaint: Post-op Evaluation (2 week post op )    HPI    Presents today for post operative evaluation, s/p RALH BSO bilateral SLND on 4/21/2025 for HG endometrial cancer.    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 Up and about, eating. +BM    History:  69 yr old para 6 referred from Dr Johnson for recently diagnosed high grade endometrial cancer. Workup for intermittent VB for the last 3-4 mo. She is otherwise asymptomatic     2/27/2025 EMB  No endometrial tissue identified     3/28/2025 taken to OR for hysteroscopy D&C  EMC:  high-grade P 53 mutated endometrial adenocarcinoma.  P16 is positive.  P 53 is negative (mutated).  Amacr is focally positive in tumor cells.  ER is weakly positive.  MMR p  Note:  The differential diagnoses include serous carcinoma and endometrioid carcinoma with metaplastic features.  Also, but less likely, is clear cell carcinoma     4/11/2025 CT C/A/P negative for metastatic disease      is 15     Of note: 2019 thickened endometrium, EMBx negative     No medical co morbidities. BMI 26     Prior lap cholecystectomy, BTL     Family history for breast cancer - maternal aunt daughters x 2. No other breast/gyn/colon cancer    Review of Systems   Constitutional:  Negative for appetite change, chills, diaphoresis, fatigue, fever and unexpected weight change.   Respiratory:  Negative for chest tightness, shortness of breath and wheezing.    Cardiovascular:  Negative for chest pain, palpitations and leg swelling.   Gastrointestinal:  Negative for abdominal pain, constipation, diarrhea, nausea and vomiting.   Genitourinary:  Negative for difficulty urinating,  "dysuria, flank pain, frequency, hematuria, pelvic pain, urgency, vaginal bleeding, vaginal discharge and vaginal pain.   Musculoskeletal:  Negative for back pain.   Integumentary:  Negative for color change.   Neurological:  Negative for dizziness, light-headedness, numbness and headaches.   Psychiatric/Behavioral:  Negative for agitation. The patient is not nervous/anxious.      /74 (BP Location: Right arm, Patient Position: Sitting)   Pulse 74   Temp 97.8 °F (36.6 °C) (Oral)   Resp 18   Ht 5' 2" (1.575 m)   Wt 61.2 kg (135 lb)   SpO2 95%   BMI 24.69 kg/m²        Objective     Physical Exam  Vitals and nursing note reviewed.   Constitutional:       General: She is not in acute distress.     Appearance: Normal appearance. She is well-developed. She is not diaphoretic.   HENT:      Head: Normocephalic and atraumatic.   Eyes:      General: No scleral icterus.  Pulmonary:      Effort: Pulmonary effort is normal. No respiratory distress.   Abdominal:      General: There is no distension.      Palpations: Abdomen is soft.   Musculoskeletal:         General: Normal range of motion.      Cervical back: Normal range of motion.      Right lower leg: No edema.      Left lower leg: No edema.   Skin:     General: Skin is warm and dry.      Coloration: Skin is not pale.      Findings: No rash.   Neurological:      Mental Status: She is alert and oriented to person, place, and time.   Psychiatric:         Mood and Affect: Mood normal.         Behavior: Behavior normal.            Assessment and Plan     1. Endometrial cancer        Healing appropriately from surgery  Pathology again reviewed and copy of report provided.  NCCN supports observation vs adjuvant VCBT +/- systemic chemo (category 2B for chemotherapy) and a copy was provided.  Gradual increase of physical activity  Continue pelvic rest. May resume baths.  RTC in 4 weeks for further treatment planning           No follow-ups on file.    "

## 2025-05-13 ENCOUNTER — OFFICE VISIT (OUTPATIENT)
Dept: GYNECOLOGIC ONCOLOGY | Facility: CLINIC | Age: 70
End: 2025-05-13
Payer: MEDICARE

## 2025-05-13 VITALS
RESPIRATION RATE: 18 BRPM | WEIGHT: 135 LBS | BODY MASS INDEX: 24.84 KG/M2 | DIASTOLIC BLOOD PRESSURE: 74 MMHG | HEART RATE: 74 BPM | SYSTOLIC BLOOD PRESSURE: 138 MMHG | TEMPERATURE: 98 F | OXYGEN SATURATION: 95 % | HEIGHT: 62 IN

## 2025-05-13 DIAGNOSIS — C54.1 ENDOMETRIAL CANCER: Primary | ICD-10-CM

## 2025-05-13 PROCEDURE — 1101F PT FALLS ASSESS-DOCD LE1/YR: CPT | Mod: CPTII,S$GLB,, | Performed by: OBSTETRICS & GYNECOLOGY

## 2025-05-13 PROCEDURE — 1159F MED LIST DOCD IN RCRD: CPT | Mod: CPTII,S$GLB,, | Performed by: OBSTETRICS & GYNECOLOGY

## 2025-05-13 PROCEDURE — 3078F DIAST BP <80 MM HG: CPT | Mod: CPTII,S$GLB,, | Performed by: OBSTETRICS & GYNECOLOGY

## 2025-05-13 PROCEDURE — 99024 POSTOP FOLLOW-UP VISIT: CPT | Mod: S$GLB,,, | Performed by: OBSTETRICS & GYNECOLOGY

## 2025-05-13 PROCEDURE — 3075F SYST BP GE 130 - 139MM HG: CPT | Mod: CPTII,S$GLB,, | Performed by: OBSTETRICS & GYNECOLOGY

## 2025-05-13 PROCEDURE — 1126F AMNT PAIN NOTED NONE PRSNT: CPT | Mod: CPTII,S$GLB,, | Performed by: OBSTETRICS & GYNECOLOGY

## 2025-05-13 PROCEDURE — 99999 PR PBB SHADOW E&M-EST. PATIENT-LVL III: CPT | Mod: PBBFAC,,, | Performed by: OBSTETRICS & GYNECOLOGY

## 2025-05-13 PROCEDURE — 3288F FALL RISK ASSESSMENT DOCD: CPT | Mod: CPTII,S$GLB,, | Performed by: OBSTETRICS & GYNECOLOGY

## 2025-06-03 ENCOUNTER — OFFICE VISIT (OUTPATIENT)
Dept: GYNECOLOGIC ONCOLOGY | Facility: CLINIC | Age: 70
End: 2025-06-03
Payer: MEDICARE

## 2025-06-03 VITALS
BODY MASS INDEX: 24.29 KG/M2 | HEIGHT: 62 IN | OXYGEN SATURATION: 96 % | HEART RATE: 62 BPM | RESPIRATION RATE: 18 BRPM | DIASTOLIC BLOOD PRESSURE: 76 MMHG | TEMPERATURE: 98 F | SYSTOLIC BLOOD PRESSURE: 132 MMHG | WEIGHT: 132 LBS

## 2025-06-03 DIAGNOSIS — C54.1 ENDOMETRIAL CANCER: Primary | ICD-10-CM

## 2025-06-03 PROCEDURE — 1101F PT FALLS ASSESS-DOCD LE1/YR: CPT | Mod: CPTII,S$GLB,, | Performed by: OBSTETRICS & GYNECOLOGY

## 2025-06-03 PROCEDURE — 3078F DIAST BP <80 MM HG: CPT | Mod: CPTII,S$GLB,, | Performed by: OBSTETRICS & GYNECOLOGY

## 2025-06-03 PROCEDURE — 99999 PR PBB SHADOW E&M-EST. PATIENT-LVL IV: CPT | Mod: PBBFAC,,, | Performed by: OBSTETRICS & GYNECOLOGY

## 2025-06-03 PROCEDURE — 3288F FALL RISK ASSESSMENT DOCD: CPT | Mod: CPTII,S$GLB,, | Performed by: OBSTETRICS & GYNECOLOGY

## 2025-06-03 PROCEDURE — 99024 POSTOP FOLLOW-UP VISIT: CPT | Mod: S$GLB,,, | Performed by: OBSTETRICS & GYNECOLOGY

## 2025-06-03 PROCEDURE — 3075F SYST BP GE 130 - 139MM HG: CPT | Mod: CPTII,S$GLB,, | Performed by: OBSTETRICS & GYNECOLOGY

## 2025-06-03 PROCEDURE — 1125F AMNT PAIN NOTED PAIN PRSNT: CPT | Mod: CPTII,S$GLB,, | Performed by: OBSTETRICS & GYNECOLOGY

## 2025-06-03 PROCEDURE — 1159F MED LIST DOCD IN RCRD: CPT | Mod: CPTII,S$GLB,, | Performed by: OBSTETRICS & GYNECOLOGY

## 2025-06-10 ENCOUNTER — OFFICE VISIT (OUTPATIENT)
Dept: RADIATION ONCOLOGY | Facility: CLINIC | Age: 70
End: 2025-06-10
Payer: MEDICARE

## 2025-06-10 ENCOUNTER — TELEPHONE (OUTPATIENT)
Dept: ORTHOPEDICS | Facility: CLINIC | Age: 70
End: 2025-06-10
Payer: MEDICARE

## 2025-06-10 VITALS
BODY MASS INDEX: 24.73 KG/M2 | HEART RATE: 65 BPM | WEIGHT: 134.38 LBS | HEIGHT: 62 IN | OXYGEN SATURATION: 97 % | DIASTOLIC BLOOD PRESSURE: 71 MMHG | SYSTOLIC BLOOD PRESSURE: 146 MMHG | TEMPERATURE: 98 F

## 2025-06-10 DIAGNOSIS — Z98.890 S/P ROBOT-ASSISTED SURGICAL PROCEDURE: Primary | ICD-10-CM

## 2025-06-10 DIAGNOSIS — C54.1 ENDOMETRIAL CANCER: ICD-10-CM

## 2025-06-10 DIAGNOSIS — M16.12 PRIMARY OSTEOARTHRITIS OF LEFT HIP: Primary | ICD-10-CM

## 2025-06-10 PROCEDURE — 99999 PR PBB SHADOW E&M-EST. PATIENT-LVL III: CPT | Mod: PBBFAC,,, | Performed by: RADIOLOGY

## 2025-06-10 PROCEDURE — 3008F BODY MASS INDEX DOCD: CPT | Mod: CPTII,S$GLB,, | Performed by: RADIOLOGY

## 2025-06-10 PROCEDURE — 1125F AMNT PAIN NOTED PAIN PRSNT: CPT | Mod: CPTII,S$GLB,, | Performed by: RADIOLOGY

## 2025-06-10 PROCEDURE — 3078F DIAST BP <80 MM HG: CPT | Mod: CPTII,S$GLB,, | Performed by: RADIOLOGY

## 2025-06-10 PROCEDURE — 1159F MED LIST DOCD IN RCRD: CPT | Mod: CPTII,S$GLB,, | Performed by: RADIOLOGY

## 2025-06-10 PROCEDURE — 99205 OFFICE O/P NEW HI 60 MIN: CPT | Mod: S$GLB,,, | Performed by: RADIOLOGY

## 2025-06-10 PROCEDURE — 3077F SYST BP >= 140 MM HG: CPT | Mod: CPTII,S$GLB,, | Performed by: RADIOLOGY

## 2025-06-10 NOTE — PROGRESS NOTES
PATIENT IDENTIFICATION:  Patient Name: Diann Wasserman  MRN: [id number]  : 1955    DIAGNOSIS:  Cancer Staging   No matching staging information was found for the patient.      HISTORY OF PRESENT ILLNESS:   The patient is a 69-year-old woman with a FIGO stage IA serous carcinoma of the endometrium.  She is status post total hysterectomy and bilateral lateral salpingo-oophorectomy.  She has been referred for consideration of vaginal cuff brachytherapy.    The patient presented with post menopausal bleeding.    2025 EMB  No endometrial tissue identified     3/28/2025 taken to OR for hysteroscopy D&C  EMC:  high-grade P 53 mutated endometrial adenocarcinoma.  P16 is positive.  P 53 is negative (mutated).  Amacr is focally positive in tumor cells.  ER is weakly positive.  MMR p  Note:  The differential diagnoses include serous carcinoma and endometrioid carcinoma with metaplastic features.  Also, but less likely, is clear cell carcinoma    Final Diagnosis   1. Uterus, cervix, bilateral fallopian tubes and ovaries, total hysterectomy with bilateral salpingo-oophorectomy:  - Endometrial serous carcinoma, confined to the endometrium  - Pathologic stage pT1a pN0  - See synoptic report below  - Benign leiomyoma  - Adenomyosis, uninvolved by carcinoma  - Cervix is uninvolved  - Bilateral fallopian tubes and ovaries within normal limits     2. [place] lymph nodes, right pelvic, lymphadenectomy:  - Eight lymph nodes, all negative for metastatic carcinoma (0/8)  - Ultrastaging with levels and immunohistochemical stains performed     3. [place] lymph nodes, left pelvic, lymphadenectomy:  - Three lymph nodes, all negative for metastatic carcinoma (0/3)  - Ultrastaging with levels and immunohistochemical stains performed     Oncology History    No history exists.       REVIEW OF SYSTEMS:   Review of Systems   Constitutional:  Negative for fever, malaise/fatigue and weight loss.   HENT:  Negative for ear pain,  hearing loss, sinus pain and sore throat.    Eyes:  Negative for blurred vision, double vision and pain.   Respiratory:  Negative for cough, hemoptysis, shortness of breath and wheezing.    Cardiovascular:  Negative for chest pain, palpitations and leg swelling.   Gastrointestinal:  Negative for abdominal pain, blood in stool, constipation, diarrhea, heartburn, nausea and vomiting.   Genitourinary:  Negative for dysuria, frequency, hematuria and urgency.   Musculoskeletal:  Positive for joint pain. Negative for back pain.   Skin:  Negative for itching and rash.   Neurological:  Negative for tingling, focal weakness, seizures and headaches.   Psychiatric/Behavioral:  Negative for depression. The patient is not nervous/anxious.          PAST MEDICAL HISTORY:  Past Medical History:   Diagnosis Date    Arthritis     Cataract     Iris nevus, left     Right hip pain        PAST SURGICAL HISTORY:  Past Surgical History:   Procedure Laterality Date    BREAST SURGERY Bilateral     breast lift    CHOLECYSTECTOMY  2012    COLONOSCOPY N/A 04/06/2016    Procedure: COLONOSCOPY;  Surgeon: Luis Bogran-Reyes, MD;  Location: [facility];  Service: Endoscopy;  Laterality: N/A;    COSMETIC SURGERY  1985    bilateral breast lift    HIP SURGERY  11/2014    right total    HYSTEROSCOPY WITH DILATION AND CURETTAGE OF UTERUS N/A 3/28/2025    Procedure: HYSTEROSCOPY, WITH DILATION AND CURETTAGE OF UTERUS;  Surgeon: Samira Johnson MD;  Location: [facility];  Service: OB/GYN;  Laterality: N/A;    JOINT REPLACEMENT      LYMPH NODE BIOPSY Bilateral 4/21/2025    Procedure: BIOPSY, LYMPH NODE;  Surgeon: Tonya Farnsworth MD;  Location: [address];  Service: OB/GYN;  Laterality: Bilateral;    MAPPING, LYMPH NODE, SENTINEL Bilateral 4/21/2025    Procedure: MAPPING, LYMPH NODE, SENTINEL;  Surgeon: Tonya Farnsworth MD;  Location: [address];  Service: OB/GYN;  Laterality: Bilateral;    TOTAL REDUCTION MAMMOPLASTY      LIft    TUBAL  LIGATION      XI ROBOTIC HYSTERECTOMY, WITH SALPINGO-OOPHORECTOMY Bilateral 4/21/2025    Procedure: XI ROBOTIC HYSTERECTOMY,WITH SALPINGO-OOPHORECTOMY;  Surgeon: Tonya Farnsworth MD;  Location: [facility] OR [address] [place] [address];  Service: OB/GYN;  Laterality: Bilateral;  2.5 hr case       ALLERGIES:   Review of patient's allergies indicates:   Allergen Reactions    Erythromycin Rash    Penicillins Rash and Other (See Comments)     Redness       MEDICATIONS:  Current Outpatient Medications   Medication Sig    ibuprofen (ADVIL,MOTRIN) 600 MG tablet Take 1 tablet by mouth every 6 hours as needed. Alternate between ibuprofen and tylenol every 3 hours. For example: @0800: ibuprofen 600mg @1100: tylenol 650mg @1400: ibuprofen 600mg @1700: tylenol 650 mg @2000: ibuprofen 600mg    multivit-min/iron/folic/rjb424 (HAIR, SKIN AND NAILS ADVANCED ORAL) Take by mouth Daily.    multivitamin (THERAGRAN) per tablet Take 1 tablet by mouth once daily.    acetaminophen (TYLENOL) 650 MG TbSR Take 1 tablet by mouth every 6 hours as needed. Alternate between ibuprofen and tylenol every 3 hours. For example: @0800: ibuprofen 600mg @1100: tylenol 650mg @1400: ibuprofen 600mg @1700: tylenol 650 mg @2000: ibuprofen 600mg    oxyCODONE (ROXICODONE) 5 MG immediate release tablet Take 1 tablet (5 mg total) by mouth every 4 (four) hours as needed for Pain.    promethazine (PHENERGAN) 12.5 MG Tab Take 1 tablet (12.5 mg total) by mouth every 6 (six) hours as needed (nausea and vomiting).    promethazine (PHENERGAN) 25 MG suppository Place 1 suppository (25 mg total) rectally every 6 (six) hours as needed for Nausea.    senna-docusate (SENNA WITH DOCUSATE SODIUM) 8.6-50 mg per tablet Take 2 tablets by mouth daily in the morning while taking narcotics    TURMERIC ORAL Take by mouth Daily.     No current facility-administered medications for this visit.       SOCIAL HISTORY:  Social History[1]    FAMILY HISTORY:  Family History   Problem Relation Name Age of Onset     Hypothyroidism Mother      Hypertension Father buzz     Aortic aneurysm Father buzz     Stroke Father buzz     No Known Problems Sister      No Known Problems Brother      Breast cancer Maternal Aunt  40    Breast cancer Cousin Annmarie          PHYSICAL EXAMINATION:  ECO  Vitals:    06/10/25 0927   BP: (!) 146/71   Pulse: 65   Temp: 97.9 °F (36.6 °C)     Body mass index is 24.58 kg/m².    Physical Exam  Constitutional:       Appearance: Normal appearance.   HENT:      Head: Normocephalic and atraumatic.      Nose: Nose normal.      Mouth/Throat:      Mouth: Mucous membranes are moist.   Cardiovascular:      Rate and Rhythm: Normal rate.   Pulmonary:      Effort: Pulmonary effort is normal.   Abdominal:      General: Abdomen is flat.      Palpations: Abdomen is soft.   Musculoskeletal:         General: Normal range of motion.      Cervical back: Normal range of motion.   Skin:     General: Skin is warm and dry.   Neurological:      General: No focal deficit present.      Mental Status: She is alert. Mental status is at baseline.             ASSESSMENT/PLAN:  Diagnoses and all orders for this visit:    S/P RA-TLH/BSO/SLNB (25)    Endometrial cancer  -     Ambulatory referral/consult to Radiation Oncology      The patient is recommended vaginal cuff brachytherapy to reduce risk of recurrence.    She will receive a dose of 21 Gy in 3 fractions over 3 weeks.      The risks and benefits of treatment have been discussed with the patient and she expressed full understanding. she understands the treatment plan and willing to proceed accordingly.    I spent approximately 60 minutes reviewing the available records and evaluating the patient, out of which over 50% of the time was spent face to face with the patient in counseling and coordinating this patient's care.           [1]   Social History  Socioeconomic History    Marital status:     Years of education: 10   Tobacco Use    Smoking status: Former      Current packs/day: 0.00     Average packs/day: 0.3 packs/day for 2.1 years (0.5 ttl pk-yrs)     Types: Cigarettes     Start date: 1969     Quit date: 1971     Years since quittin.1     Passive exposure: Past    Smokeless tobacco: Never    Tobacco comments:     less than 1ppd x 3 years   Substance and Sexual Activity    Alcohol use: Yes     Alcohol/week: 5.0 standard drinks of alcohol     Types: 3 Glasses of wine, 2 Drinks containing 0.5 oz of alcohol per week     Comment: one bottle of wine on weekends    Drug use: No    Sexual activity: Yes     Partners: Male     Birth control/protection: See Surgical Hx     Social Drivers of Health     Financial Resource Strain: Low Risk  (2024)    Overall Financial Resource Strain (CARDIA)     Difficulty of Paying Living Expenses: Not hard at all   Food Insecurity: No Food Insecurity (2024)    Hunger Vital Sign     Worried About Running Out of Food in the Last Year: Never true     Ran Out of Food in the Last Year: Never true   Transportation Needs: No Transportation Needs (2023)    PRAPARE - Transportation     Lack of Transportation (Medical): No     Lack of Transportation (Non-Medical): No   Physical Activity: Unknown (2024)    Exercise Vital Sign     Days of Exercise per Week: Patient declined     Minutes of Exercise per Session: 60 min   Stress: No Stress Concern Present (2024)    [employer] - Occupational Stress Questionnaire     Feeling of Stress : Only a little   Housing Stability: Unknown (2024)    Housing Stability Vital Sign     Unable to Pay for Housing in the Last Year: No

## 2025-06-11 ENCOUNTER — HOSPITAL ENCOUNTER (OUTPATIENT)
Dept: RADIATION THERAPY | Facility: HOSPITAL | Age: 70
Discharge: HOME | End: 2025-06-11
Payer: MEDICARE

## 2025-06-11 PROCEDURE — 77014 HC CT GUIDANCE RADIATION THERAPY FLDS PLACEMENT: CPT | Mod: TC | Performed by: RADIOLOGY

## 2025-06-11 PROCEDURE — 77290 THER RAD SIMULAJ FIELD CPLX: CPT | Mod: TC | Performed by: RADIOLOGY

## 2025-06-11 PROCEDURE — 77334 RADIATION TREATMENT AID(S): CPT | Mod: TC | Performed by: RADIOLOGY

## 2025-06-11 PROCEDURE — 77263 THER RADIOLOGY TX PLNG CPLX: CPT | Mod: ,,, | Performed by: RADIOLOGY

## 2025-06-11 PROCEDURE — 77334 RADIATION TREATMENT AID(S): CPT | Mod: 26,,, | Performed by: RADIOLOGY

## 2025-06-11 PROCEDURE — 77290 THER RAD SIMULAJ FIELD CPLX: CPT | Mod: 26,,, | Performed by: RADIOLOGY

## 2025-06-18 ENCOUNTER — HOSPITAL ENCOUNTER (OUTPATIENT)
Dept: RADIOLOGY | Facility: HOSPITAL | Age: 70
Discharge: HOME | End: 2025-06-18
Attending: ORTHOPAEDIC SURGERY
Payer: MEDICARE

## 2025-06-18 ENCOUNTER — OFFICE VISIT (OUTPATIENT)
Dept: ORTHOPEDICS | Facility: CLINIC | Age: 70
End: 2025-06-18
Payer: MEDICARE

## 2025-06-18 DIAGNOSIS — M16.12 PRIMARY OSTEOARTHRITIS OF LEFT HIP: ICD-10-CM

## 2025-06-18 DIAGNOSIS — M16.12 PRIMARY OSTEOARTHRITIS OF LEFT HIP: Primary | ICD-10-CM

## 2025-06-18 PROBLEM — C54.1 ENDOMETRIAL CANCER: Status: ACTIVE | Noted: 2025-06-18

## 2025-06-18 PROCEDURE — 73502 X-RAY EXAM HIP UNI 2-3 VIEWS: CPT | Mod: TC,LT

## 2025-06-18 PROCEDURE — 99214 OFFICE O/P EST MOD 30 MIN: CPT | Mod: S$GLB,,, | Performed by: ORTHOPAEDIC SURGERY

## 2025-06-18 PROCEDURE — 1159F MED LIST DOCD IN RCRD: CPT | Mod: CPTII,S$GLB,, | Performed by: ORTHOPAEDIC SURGERY

## 2025-06-18 PROCEDURE — 1125F AMNT PAIN NOTED PAIN PRSNT: CPT | Mod: CPTII,S$GLB,, | Performed by: ORTHOPAEDIC SURGERY

## 2025-06-18 PROCEDURE — 99999 PR PBB SHADOW E&M-EST. PATIENT-LVL III: CPT | Mod: PBBFAC,,, | Performed by: ORTHOPAEDIC SURGERY

## 2025-06-18 PROCEDURE — 73502 X-RAY EXAM HIP UNI 2-3 VIEWS: CPT | Mod: 26,LT,, | Performed by: RADIOLOGY

## 2025-06-18 PROCEDURE — 1160F RVW MEDS BY RX/DR IN RCRD: CPT | Mod: CPTII,S$GLB,, | Performed by: ORTHOPAEDIC SURGERY

## 2025-06-18 NOTE — PROGRESS NOTES
HPI: 69-year-old female complains of pain in the left hip.  The pain has been going on for quite some time.  She has a history of right total hip replacement in 2014 for osteoarthritis.  She has now been going to physical therapy with some improvement in her pain.  The pain is largely in the groin, is exacerbated by activity and relieved by rest.  She does also complain of occasional pain in the posterior aspect of the hip.  She states that her physical therapist worked on some exercises with her and she has now developed a home exercise program.    03/28/2025:  Patient wanted originally to wait until June to proceed with total hip arthroplasty, however she has gotten to the point where activities of daily living have become difficult as she can not ambulate normally.  She has constant pain in the left groin which is exacerbated by increased activity and minimally relieved by rest.      06/18/2025:    Past Medical History:   Diagnosis Date    Arthritis     Cataract     Iris nevus, left     Right hip pain      Past Surgical History:   Procedure Laterality Date    BREAST SURGERY Bilateral     breast lift    CHOLECYSTECTOMY  2012    COLONOSCOPY N/A 04/06/2016    Procedure: COLONOSCOPY;  Surgeon: Luis Bogran-Reyes, MD;  Location: [facility];  Service: Endoscopy;  Laterality: N/A;    COSMETIC SURGERY  1985    bilateral breast lift    HIP SURGERY  11/2014    right total    HYSTEROSCOPY WITH DILATION AND CURETTAGE OF UTERUS N/A 3/28/2025    Procedure: HYSTEROSCOPY, WITH DILATION AND CURETTAGE OF UTERUS;  Surgeon: Samira Johnson MD;  Location: [facility];  Service: OB/GYN;  Laterality: N/A;    JOINT REPLACEMENT      LYMPH NODE BIOPSY Bilateral 4/21/2025    Procedure: BIOPSY, LYMPH NODE;  Surgeon: Tonya Farnsworth MD;  Location: [address];  Service: OB/GYN;  Laterality: Bilateral;    MAPPING, LYMPH NODE, SENTINEL Bilateral 4/21/2025    Procedure: MAPPING, LYMPH NODE, SENTINEL;  Surgeon: Tonya Farnsworth MD;   Location: [facility] OR [address] [place] [address];  Service: OB/GYN;  Laterality: Bilateral;    TOTAL REDUCTION MAMMOPLASTY      LIft    TUBAL LIGATION      XI ROBOTIC HYSTERECTOMY, WITH SALPINGO-OOPHORECTOMY Bilateral 4/21/2025    Procedure: XI ROBOTIC HYSTERECTOMY,WITH SALPINGO-OOPHORECTOMY;  Surgeon: Tonya Farnsworth MD;  Location: [facility] OR [address] [place] [address];  Service: OB/GYN;  Laterality: Bilateral;  2.5 hr case             Current Outpatient Medications on File Prior to Visit     Outpatient Medications as of 6/18/2025   Medication Sig Dispense Refill    acetaminophen (TYLENOL) 650 MG TbSR Take 1 tablet by mouth every 6 hours as needed. Alternate between ibuprofen and tylenol every 3 hours. For example: @0800: ibuprofen 600mg @1100: tylenol 650mg @1400: ibuprofen 600mg @1700: tylenol 650 mg @2000: ibuprofen 600mg 30 tablet 1    ibuprofen (ADVIL,MOTRIN) 600 MG tablet Take 1 tablet by mouth every 6 hours as needed. Alternate between ibuprofen and tylenol every 3 hours. For example: @0800: ibuprofen 600mg @1100: tylenol 650mg @1400: ibuprofen 600mg @1700: tylenol 650 mg @2000: ibuprofen 600mg 30 tablet 1    multivit-min/iron/folic/owt132 (HAIR, SKIN AND NAILS ADVANCED ORAL) Take by mouth Daily.      multivitamin (THERAGRAN) per tablet Take 1 tablet by mouth once daily.      TURMERIC ORAL Take by mouth Daily.      oxyCODONE (ROXICODONE) 5 MG immediate release tablet Take 1 tablet (5 mg total) by mouth every 4 (four) hours as needed for Pain. 15 tablet 0    promethazine (PHENERGAN) 12.5 MG Tab Take 1 tablet (12.5 mg total) by mouth every 6 (six) hours as needed (nausea and vomiting). 15 tablet 0    promethazine (PHENERGAN) 25 MG suppository Place 1 suppository (25 mg total) rectally every 6 (six) hours as needed for Nausea. 5 suppository 0    senna-docusate (SENNA WITH DOCUSATE SODIUM) 8.6-50 mg per tablet Take 2 tablets by mouth daily in the morning while taking narcotics 30 tablet 0     No current facility-administered medications on file as of  2025.         Social History     Socioeconomic History    Marital status:     Years of education: 10   Tobacco Use    Smoking status: Former     Current packs/day: 0.00     Average packs/day: 0.3 packs/day for 2.1 years (0.5 ttl pk-yrs)     Types: Cigarettes     Start date: 1969     Quit date: 1971     Years since quittin.6    Smokeless tobacco: Former    Tobacco comments:     less than 1ppd x 3 years   Substance and Sexual Activity    Alcohol use: Yes     Alcohol/week: 5.0 standard drinks of alcohol     Types: 3 Glasses of wine, 2 Drinks containing 0.5 oz of alcohol per week     Comment: one bottle of wine on weekends    Drug use: No    Sexual activity: Yes     Partners: Male     Birth control/protection: See Surgical Hx     Social Drivers of Health     Financial Resource Strain: Low Risk  (2024)    Overall Financial Resource Strain (CARDIA)     Difficulty of Paying Living Expenses: Not hard at all   Food Insecurity: No Food Insecurity (2024)    Hunger Vital Sign     Worried About Running Out of Food in the Last Year: Never true     Ran Out of Food in the Last Year: Never true   Transportation Needs: No Transportation Needs (2023)    PRAPARE - Transportation     Lack of Transportation (Medical): No     Lack of Transportation (Non-Medical): No   Physical Activity: Unknown (2024)    Exercise Vital Sign     Days of Exercise per Week: Patient declined     Minutes of Exercise per Session: 60 min   Stress: No Stress Concern Present (2024)    [employer] - Occupational Stress Questionnaire     Feeling of Stress : Only a little   Housing Stability: Unknown (2024)    Housing Stability Vital Sign     Unable to Pay for Housing in the Last Year: No         ROS:  Patient denies constitutional symptoms, cardiac symptoms, respiratory symptoms, GI symptoms, Urinary symptoms, skin issues, allergic issues  The remainder of the musculoskeletal ROS is  included in the HPI.    PE:    AA&O x 4.  NAD.  Normal mood and affect.  HEENT:  NCAT, sclera nonicteric  Lungs:  Respirations are equal and unlabored.  Abd: Non distended  :  No evidence of incontinence  CV:  2+ bilateral upper and lower extremity pulses.  Skin:  Intact throughout.    MS:  Significantly antalgic gait.  Left hip with 110° flexion.  30° external rotation and 10° internal rotation.  Very stiff.  Extreme pain with log roll and with all extremes of motion.  Neurovascular intact grossly.    Rads:  Reviewed and interpreted today by me.  Patient has osteoarthritis of the left hip, [place] 3.    A/P:  69-year-old female with moderate to severe arthritis of the left hip.  At this point she has 1 remaining radiation therapy treatment in mid July.  This is targeted in the central pelvis.  I did recommend, however that we wait at least a month after this to make sure she is doing well in that her issues are resolved.  I will tentatively book her for left total hip arthroplasty in mid August.  Left total hip arthroplasty, anterior approach.  I am booking her for August 12th tentatively.    The risks, benefits and alternatives to surgery were discussed with the patient and family at great length.  These include bleeding, infection, vessel/nerve damage, pain, numbness, tingling, complex regional pain syndrome, hardware/surgical failure, need for further surgery, instability/dislocation, leg length discrepancy, iatrogenic femur fracture with broaching, heterotopic ossification, DVT, PE, arthritis and death.  We discussed the need for early mobilization, and the expected rehab course.  Patient states an understanding and wishes to proceed with surgery.   All questions were answered.  No guarantees were implied or stated.  Informed consent was obtained.

## 2025-06-26 ENCOUNTER — TELEPHONE (OUTPATIENT)
Dept: ORTHOPEDICS | Facility: CLINIC | Age: 70
End: 2025-06-26
Payer: MEDICARE

## 2025-06-30 ENCOUNTER — PATIENT MESSAGE (OUTPATIENT)
Dept: RADIATION ONCOLOGY | Facility: CLINIC | Age: 70
End: 2025-06-30
Payer: MEDICARE

## 2025-06-30 DIAGNOSIS — C54.1 ENDOMETRIAL CANCER: ICD-10-CM

## 2025-06-30 DIAGNOSIS — R45.89 ANXIETY ABOUT TREATMENT: Primary | ICD-10-CM

## 2025-06-30 RX ORDER — LORAZEPAM 0.5 MG/1
0.5 TABLET ORAL EVERY 6 HOURS PRN
Qty: 6 TABLET | Refills: 0 | Status: SHIPPED | OUTPATIENT
Start: 2025-06-30 | End: 2025-07-30

## 2025-07-01 ENCOUNTER — HOSPITAL ENCOUNTER (OUTPATIENT)
Dept: RADIATION THERAPY | Facility: HOSPITAL | Age: 70
Discharge: HOME | End: 2025-07-01
Attending: RADIOLOGY
Payer: MEDICARE

## 2025-07-02 ENCOUNTER — PROCEDURE VISIT (OUTPATIENT)
Dept: RADIATION ONCOLOGY | Facility: CLINIC | Age: 70
End: 2025-07-02
Payer: MEDICARE

## 2025-07-02 DIAGNOSIS — C54.1 ENDOMETRIAL CANCER: Primary | ICD-10-CM

## 2025-07-02 PROCEDURE — 77370 RADIATION PHYSICS CONSULT: CPT | Performed by: RADIOLOGY

## 2025-07-02 PROCEDURE — 77470 SPECIAL RADIATION TREATMENT: CPT | Mod: 59,TC | Performed by: RADIOLOGY

## 2025-07-02 PROCEDURE — C1717 BRACHYTX, NON-STR,HDR IR-192: HCPCS | Performed by: RADIOLOGY

## 2025-07-02 PROCEDURE — 57156 INS VAG BRACHYTX DEVICE: CPT | Mod: ,,, | Performed by: RADIOLOGY

## 2025-07-02 PROCEDURE — 77770 HDR RDNCL NTRSTL/ICAV BRCHTX: CPT | Mod: TC | Performed by: RADIOLOGY

## 2025-07-02 PROCEDURE — 57156 INS VAG BRACHYTX DEVICE: CPT | Mod: TC | Performed by: RADIOLOGY

## 2025-07-02 PROCEDURE — 77295 3-D RADIOTHERAPY PLAN: CPT | Mod: 26,,, | Performed by: RADIOLOGY

## 2025-07-02 PROCEDURE — 77770 HDR RDNCL NTRSTL/ICAV BRCHTX: CPT | Mod: 26,,, | Performed by: RADIOLOGY

## 2025-07-02 PROCEDURE — 77300 RADIATION THERAPY DOSE PLAN: CPT | Mod: TC | Performed by: RADIOLOGY

## 2025-07-02 PROCEDURE — 77295 3-D RADIOTHERAPY PLAN: CPT | Mod: TC | Performed by: RADIOLOGY

## 2025-07-02 PROCEDURE — 77470 SPECIAL RADIATION TREATMENT: CPT | Mod: 26,59,, | Performed by: RADIOLOGY

## 2025-07-02 NOTE — NURSING
07/02/2025  Nurse: Lonnie Sykes    Procedure: Vaginal Cylinder    1:39 PM Patient arrived from Home.  Time out performed.      2:29 PM Positioned on Stretcher in the Frog Leg position. Positioned with Knee Immobilizer by myself and 3.0 CM vaginal applicator inserted..HDR treatment given with a full bladder.        2:39 PM Discharge instructions reviewed with patient.  Patient verbalized understanding.  Patient discharged to home with family.

## 2025-07-03 ENCOUNTER — PATIENT MESSAGE (OUTPATIENT)
Dept: HEMATOLOGY/ONCOLOGY | Facility: CLINIC | Age: 70
End: 2025-07-03
Payer: MEDICARE

## 2025-07-03 ENCOUNTER — OFFICE VISIT (OUTPATIENT)
Dept: OPTOMETRY | Facility: CLINIC | Age: 70
End: 2025-07-03
Payer: COMMERCIAL

## 2025-07-03 DIAGNOSIS — H52.203 HYPEROPIA OF BOTH EYES WITH ASTIGMATISM: Primary | ICD-10-CM

## 2025-07-03 DIAGNOSIS — D31.42 IRIS NEVUS, LEFT: ICD-10-CM

## 2025-07-03 DIAGNOSIS — Z46.0 ENCOUNTER FOR FITTING OR ADJUSTMENT OF SPECTACLES OR CONTACT LENSES: ICD-10-CM

## 2025-07-03 DIAGNOSIS — H25.13 NUCLEAR SCLEROSIS OF BOTH EYES: ICD-10-CM

## 2025-07-03 DIAGNOSIS — H52.03 HYPEROPIA OF BOTH EYES WITH ASTIGMATISM: Primary | ICD-10-CM

## 2025-07-03 DIAGNOSIS — H52.4 PRESBYOPIA OF BOTH EYES: ICD-10-CM

## 2025-07-03 DIAGNOSIS — Z01.00 EXAMINATION OF EYES AND VISION: ICD-10-CM

## 2025-07-03 DIAGNOSIS — Z46.0 ENCOUNTER FOR FITTING OR ADJUSTMENT OF SPECTACLES OR CONTACT LENSES: Primary | ICD-10-CM

## 2025-07-03 PROCEDURE — 99499 UNLISTED E&M SERVICE: CPT | Mod: S$GLB,,, | Performed by: OPTOMETRIST

## 2025-07-03 PROCEDURE — 99999 PR PBB SHADOW E&M-EST. PATIENT-LVL II: CPT | Mod: PBBFAC,,, | Performed by: OPTOMETRIST

## 2025-07-03 NOTE — PROGRESS NOTES
HPI    69 y.o F is present today for ocular health check. C/O va in distance has   declined. She is a full -time Cls wearer. She sts she wear her current   glasses of 2 yrs, prn. Pt sts her near va is stable.   Pt wears [employer] Cls.   EYE DROPS - Refresh,PRN  Last edited by Katt Blank on 7/3/2025 10:08 AM.            Assessment /Plan     For exam results, see Encounter Report.    Hyperopia of both eyes with astigmatism    Encounter for fitting or adjustment of spectacles or contact lenses    Examination of eyes and vision    Iris nevus, left    Nuclear sclerosis of both eyes    Presbyopia of both eyes      MONITOR. ED PT ON ALL EXAM FINDINGS  RX FINAL SPECS; TRIAL CLS.   OCULAR HEALTH STABLE OD, OS  STABLE IRIS NEVUS OS; MONITOR.   MILD NS OU; UV PROTECTION; MONITOR.   DISCUSSED DRY EYE THERAPIES; MONITOR.   RTC 1 YR//PRN FOR REE/DFE

## 2025-07-09 ENCOUNTER — PROCEDURE VISIT (OUTPATIENT)
Dept: RADIATION ONCOLOGY | Facility: CLINIC | Age: 70
End: 2025-07-09
Payer: MEDICARE

## 2025-07-09 DIAGNOSIS — C54.1 ENDOMETRIAL CANCER: Primary | ICD-10-CM

## 2025-07-09 PROCEDURE — 57156 INS VAG BRACHYTX DEVICE: CPT | Mod: ,,, | Performed by: RADIOLOGY

## 2025-07-09 PROCEDURE — 77770 HDR RDNCL NTRSTL/ICAV BRCHTX: CPT | Mod: TC | Performed by: RADIOLOGY

## 2025-07-09 PROCEDURE — 57156 INS VAG BRACHYTX DEVICE: CPT | Mod: TC | Performed by: RADIOLOGY

## 2025-07-09 PROCEDURE — C1717 BRACHYTX, NON-STR,HDR IR-192: HCPCS | Performed by: RADIOLOGY

## 2025-07-09 PROCEDURE — 77770 HDR RDNCL NTRSTL/ICAV BRCHTX: CPT | Mod: 26,,, | Performed by: RADIOLOGY

## 2025-07-09 NOTE — NURSING
07/09/2025  Nurse: Lonnie Sykes    Procedure: Vaginal Cylinder    1:39 PM Patient arrived from Home.  Time out performed.      2:50 PM Positioned on Stretcher in the Frog Leg position. Positioned with Knee Immobilizer by myself and 3.0 CM vaginal applicator inserted. HDR treatment given with a full bladder.    3:02 PM Discharge instructions reviewed with patient.  Patient verbalized understanding.  Patient discharged to home with family.

## 2025-07-14 ENCOUNTER — TELEPHONE (OUTPATIENT)
Dept: RADIATION ONCOLOGY | Facility: CLINIC | Age: 70
End: 2025-07-14
Payer: MEDICARE

## 2025-07-14 NOTE — TELEPHONE ENCOUNTER
Call to pt to see if she is able to come in tomorrow instead of Wednesday for her last vag cuff HDR. Pt states she is in [place] at present time, but will plan to just leave [place] to come in tomorrow for 2:00 PM for her last HDR treatment.

## 2025-07-15 ENCOUNTER — PROCEDURE VISIT (OUTPATIENT)
Dept: RADIATION ONCOLOGY | Facility: CLINIC | Age: 70
End: 2025-07-15
Payer: MEDICARE

## 2025-07-15 DIAGNOSIS — C54.1 ENDOMETRIAL CANCER: Primary | ICD-10-CM

## 2025-07-15 NOTE — PROGRESS NOTES
PATIENT IDENTIFICATION:  Patient Name: Diann Wasserman  MRN: [id number]  : 1955    DIAGNOSIS: Cancer Staging   No matching staging information was found for the patient.    Endometrial cancer [C54.1]      REFERRING PHYSICIAN: No referring provider defined for this encounter.    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 She is status post total hysterectomy and bilateral lateral salpingo-oophorectomy.       PROCEDURE: Intracavitary vaginal cylinder #3     AREA TREATED: Upper 3 cm of vagina     TREATMENT METHOD: Insertion of 3 cm vaginal cylinder     RADIATION: Iridium 192, high dose rate     DEPTH OF CALCULATION: vaginal surface     DOSE: 7 Gy      Time out:   Performed by Lonnie Sykes RN     Identifiers: Name and date of birth     The patient was brought to the HDR delivery room and  the vaginal cylinder was placed into the vagina.  The brachytherapy catheter was connected to the cylinder and the treatment was delivered.  She received the 3 out of 3 planned fractions of HDR vaginal brachytherapy as noted above.  She tolerated the treatment well without any unexpected events.

## 2025-07-15 NOTE — PROGRESS NOTES
PATIENT IDENTIFICATION:  Patient Name: Diann Wasserman  MRN: [id number]  : 1955    DIAGNOSIS:  Cancer Staging   No matching staging information was found for the patient.    No primary diagnosis found.      REFERRING PHYSICIAN: No referring provider defined for this encounter.    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 She is status post total hysterectomy and bilateral lateral salpingo-oophorectomy.       PROCEDURE: Intracavitary vaginal cylinder #2     AREA TREATED: Upper 3 cm of vagina     TREATMENT METHOD: Insertion of 3 cm vaginal cylinder     RADIATION: Iridium 192, high dose rate     DEPTH OF CALCULATION: vaginal surface     DOSE: 7 Gy      Time out:   Performed by Lonnie Sykes RN     Identifiers: Name and date of birth     The patient was brought to the HDR delivery room and  the vaginal cylinder was placed into the vagina.  The brachytherapy catheter was connected to the cylinder and the treatment was delivered.  She received the 2 out of 3 planned fractions of HDR vaginal brachytherapy as noted above.  She tolerated the treatment well without any unexpected events.

## 2025-07-15 NOTE — PROGRESS NOTES
PATIENT IDENTIFICATION:  Patient Name: Diann Wasserman  MRN: [id number]  : 1955    DIAGNOSIS:  Cancer Staging   No matching staging information was found for the patient.    No primary diagnosis found.      REFERRING PHYSICIAN: No referring provider defined for this encounter.    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 She is status post total hysterectomy and bilateral lateral salpingo-oophorectomy.       PROCEDURE: Intracavitary vaginal cylinder #1     AREA TREATED: Upper 3 cm of vagina     TREATMENT METHOD: Insertion of 3 cm vaginal cylinder     RADIATION: Iridium 192, high dose rate     DEPTH OF CALCULATION: vaginal surface     DOSE: 7 Gy      Time out:   Performed by Lonnie Sykes RN     Identifiers: Name and date of birth     The patient was brought to the HDR delivery room and  the vaginal cylinder was placed into the vagina.  The brachytherapy catheter was connected to the cylinder and the treatment was delivered.  She received the 1 out of 3 planned fractions of HDR vaginal brachytherapy as noted above.  She tolerated the treatment well without any unexpected events.

## 2025-07-16 ENCOUNTER — PATIENT MESSAGE (OUTPATIENT)
Dept: OPTOMETRY | Facility: CLINIC | Age: 70
End: 2025-07-16
Payer: MEDICARE

## 2025-07-17 ENCOUNTER — TELEPHONE (OUTPATIENT)
Dept: ORTHOPEDICS | Facility: CLINIC | Age: 70
End: 2025-07-17
Payer: MEDICARE

## 2025-07-21 ENCOUNTER — TELEPHONE (OUTPATIENT)
Facility: HOSPITAL | Age: 70
End: 2025-07-21
Payer: MEDICARE

## 2025-07-21 RX ORDER — VIT C/E/ZN/COPPR/LUTEIN/ZEAXAN 250MG-90MG
1000 CAPSULE ORAL DAILY
COMMUNITY

## 2025-07-21 RX ORDER — ATORVASTATIN CALCIUM 20 MG/1
20 TABLET, FILM COATED ORAL NIGHTLY
COMMUNITY

## 2025-07-21 NOTE — TELEPHONE ENCOUNTER
----- Message from Renata Everett NP sent at 7/21/2025 12:07 PM CDT -----  Regarding: RE: requesting clearance for upcoming ortho surgery  Hi Jeane,  She is cleared from our standpoint. She just completed vaginal brachytherapy and we are to see her for routine follow up in a few months but we have no specific recommendations for ortho surgery.  Thank you for seeing her.  Renata Everett NP for Dr Farnsworth  ----- Message -----  From: Abi Mendoza RN  Sent: 7/21/2025  11:53 AM CDT  To: Tonya Farnsworth MD; Renata Castro#  Subject: FW: requesting clearance for upcoming ortho #    Please advise.  ----- Message -----  From: Jeane Estrella RN  Sent: 7/21/2025  11:44 AM CDT  To: Radha Westbrook MD; Jeane Garcia#  Subject: requesting clearance for upcoming ortho surg#    Dr. Farnsworth,  [facility] Dr. Ritchie on 8/12/25.  Requesting pre-op clearance and any recs you may have for us.  Thank you for your assistance,  Brittaney Estrella RN  [facility]  [phone]

## 2025-07-22 ENCOUNTER — TELEPHONE (OUTPATIENT)
Dept: ORTHOPEDICS | Facility: CLINIC | Age: 70
End: 2025-07-22
Payer: MEDICARE

## 2025-07-22 NOTE — TELEPHONE ENCOUNTER
----- Message from Yoli Pandya PA-C sent at 7/22/2025  6:32 AM CDT -----  Please see if can adjust appointment.  ----- Message -----  From: Jeane Estrella RN  Sent: 7/21/2025   2:17 PM CDT  To: Yoli Pandya PA-C; Jeane Estrella RN    Hi Yoli,  This patient is scheduled for BLAYNE with Dr. Ritchie on 8/12/25.  I spoke with patient today and she wanted me to ask if you are aware she will be seeing Dr. Westbrook on 8/4/25 for her pre-op apt at 11:00am and then is scheduled to see you at 11:30?  Just wanted to let you know she will be late for your apt.  Thank you,  Brittaney Estrella RN  [facility]  [phone]

## 2025-07-22 NOTE — TELEPHONE ENCOUNTER
Spoke with pt. Pt is schedule for her pre-op on 8/5/25. Patient states verbal understanding and has no further questions.

## 2025-07-23 ENCOUNTER — TELEPHONE (OUTPATIENT)
Facility: HOSPITAL | Age: 70
End: 2025-07-23
Payer: MEDICARE

## 2025-07-23 NOTE — TELEPHONE ENCOUNTER
----- Message from Tonya Farnsworth MD sent at 7/23/2025  1:38 PM CDT -----  Regarding: RE: requesting clearance for upcoming ortho surgery  No bleeding risk from an endometrial cancer standpoint. She is in surveillance without evidence of active disease.   Thank you,  HELADIO  ----- Message -----  From: Radha Westbrook MD  Sent: 7/22/2025   7:35 AM CDT  To: Xu Ritchie MD; Tonya Farnsworth MD; C#  Subject: RE: requesting clearance for upcoming ortho #    Thanks Team !  I do not think I know her . She is scheduled with me on 8/4 th for surgery on 8/12 th     From what I see in this correspondence note   Looks like she seems to be doing good from an endometrial cancer stand point   Dr Farnsworth , as you know , hip replacement puts her at high risk of venous thromboembolism ( VTE)  If the endometrial cancer is active , the risk may increase further     In general, Aspirin is used for patients with  low VTE risk and Apixaban with high VTE risk   Do you have any preference here ?  Also any risk of bleeding from endometrial cancer, if we choose Apixaban ?    Thank you     Keith  ----- Message -----  From: Tonya Farnsworth MD  Sent: 7/21/2025  12:05 PM CDT  To: Renata Everett, ELY; Radha DESAI #  Subject: RE: requesting clearance for upcoming ortho #    There are no specific recommendations from an endometrial cancer standpoint. She has completed treatment and is in surveillance. She is not currently on any active cancer therapies. Please let me know if you have any other questions.  ----- Message -----  From: Abi Mendoza RN  Sent: 7/21/2025  11:53 AM CDT  To: Tonya Farnsworth MD; Renata Castro#  Subject: FW: requesting clearance for upcoming ortho #    Please advise.  ----- Message -----  From: Jeane Estrella RN  Sent: 7/21/2025  11:44 AM CDT  To: Radha Westbrook MD; Jeane Garcia#  Subject: requesting clearance for upcoming ortho surg#    Dr. Farnsworth,  Ms. Wasserman  is scheduled for BLAYNE with Dr. Ritchie on 8/12/25.  Requesting pre-op clearance and any recs you may have for us.  Thank you for your assistance,  Brittaney Estrella RN  [facility] [place]  [phone]

## 2025-07-24 ENCOUNTER — PATIENT MESSAGE (OUTPATIENT)
Dept: OPTOMETRY | Facility: CLINIC | Age: 70
End: 2025-07-24
Payer: MEDICARE

## 2025-08-03 ENCOUNTER — ANESTHESIA EVENT (OUTPATIENT)
Dept: SURGERY | Facility: HOSPITAL | Age: 70
End: 2025-08-03
Payer: MEDICARE

## 2025-08-04 ENCOUNTER — OFFICE VISIT (OUTPATIENT)
Facility: CLINIC | Age: 70
End: 2025-08-04
Payer: MEDICARE

## 2025-08-04 VITALS
RESPIRATION RATE: 18 BRPM | BODY MASS INDEX: 25.03 KG/M2 | SYSTOLIC BLOOD PRESSURE: 145 MMHG | TEMPERATURE: 98 F | OXYGEN SATURATION: 98 % | HEART RATE: 72 BPM | DIASTOLIC BLOOD PRESSURE: 82 MMHG | HEIGHT: 62 IN | WEIGHT: 136 LBS

## 2025-08-04 DIAGNOSIS — E78.5 HYPERLIPIDEMIA, UNSPECIFIED HYPERLIPIDEMIA TYPE: ICD-10-CM

## 2025-08-04 DIAGNOSIS — R11.2 PONV (POSTOPERATIVE NAUSEA AND VOMITING): ICD-10-CM

## 2025-08-04 DIAGNOSIS — Z86.16 HISTORY OF COVID-19: ICD-10-CM

## 2025-08-04 DIAGNOSIS — M54.16 LUMBAR RADICULOPATHY: Primary | ICD-10-CM

## 2025-08-04 DIAGNOSIS — Z98.890 PONV (POSTOPERATIVE NAUSEA AND VOMITING): ICD-10-CM

## 2025-08-04 DIAGNOSIS — C54.1 ENDOMETRIAL CANCER: ICD-10-CM

## 2025-08-04 PROCEDURE — 3008F BODY MASS INDEX DOCD: CPT | Mod: CPTII,S$GLB,, | Performed by: HOSPITALIST

## 2025-08-04 PROCEDURE — 1125F AMNT PAIN NOTED PAIN PRSNT: CPT | Mod: CPTII,S$GLB,, | Performed by: HOSPITALIST

## 2025-08-04 PROCEDURE — 3077F SYST BP >= 140 MM HG: CPT | Mod: CPTII,S$GLB,, | Performed by: HOSPITALIST

## 2025-08-04 PROCEDURE — 3079F DIAST BP 80-89 MM HG: CPT | Mod: CPTII,S$GLB,, | Performed by: HOSPITALIST

## 2025-08-04 PROCEDURE — 99999 PR PBB SHADOW E&M-EST. PATIENT-LVL IV: CPT | Mod: PBBFAC,,, | Performed by: HOSPITALIST

## 2025-08-04 PROCEDURE — 1160F RVW MEDS BY RX/DR IN RCRD: CPT | Mod: CPTII,S$GLB,, | Performed by: HOSPITALIST

## 2025-08-04 PROCEDURE — 1159F MED LIST DOCD IN RCRD: CPT | Mod: CPTII,S$GLB,, | Performed by: HOSPITALIST

## 2025-08-04 PROCEDURE — 99215 OFFICE O/P EST HI 40 MIN: CPT | Mod: S$GLB,,, | Performed by: HOSPITALIST

## 2025-08-04 RX ORDER — UBIDECARENONE 100 MG
100 CAPSULE ORAL DAILY
COMMUNITY

## 2025-08-04 RX ORDER — SCOPOLAMINE 1 MG/3D
1 PATCH, EXTENDED RELEASE TRANSDERMAL
OUTPATIENT
Start: 2025-08-12

## 2025-08-04 NOTE — ASSESSMENT & PLAN NOTE
She had COVID a few years but he has a recovered from it    Did not require hospitalization, intubation or supplemental oxygen use   Had not been vaccinated   Recovered from COVID    COVID screening     No fever   No cough   No SOB  No sore throat   No loss of taste or smell   No muscle aches   No nausea, vomiting , diarrhea

## 2025-08-04 NOTE — ASSESSMENT & PLAN NOTE
She presented with postmenopausal spotting in January of 2025 after finishing with her menstrual cycles at 1 years of age    History of endometrial cancer- Endometrial cancer s/p RALH BSO 4/21/25 (vaginal cuff brachytherapy 3/3 completed 7/15/25)    She did not require chemotherapy  She had radiation treatment to the vaginal area once a week that she finished July 15th and she tolerated radiation well    She felt tired radiation therapy which she has since recovered    She currently has no vaginal spotting or bleeding    Appreciate gynecology input and we will let her proceed with the surgery    Discussed with her and her involved  that blood clots can happen with joint replacement surgery  She had endometrial cancer this year and just finished treatment for that    Discussed medication for blood clot prevention with aspirin/Eliquis  She is open with either option    Corresponded with the guide oncologists-No bleeding risk from an endometrial cancer standpoint. She is in surveillance without evidence of active disease

## 2025-08-04 NOTE — ASSESSMENT & PLAN NOTE
HLD-I  suggest continuation of statin during the entire perioperative period.   Not known to have circulation problem

## 2025-08-04 NOTE — ASSESSMENT & PLAN NOTE
She had, after returning home after hysterectomy surgery had nausea and vomiting and had to return to the emergency room    Post operative nausea, vomiting - I suggest that the perioperative team be aware of this so that appropriate preventive care can be taken

## 2025-08-04 NOTE — HPI
History of present illness- I had the pleasure of meeting this pleasant 69 y.o. lady in the pre op clinic prior to her elective Orthopedic surgery. The patient is new to me .Ms Tidwell was accompanied by  Mr Morales.  Offered to have family to be on the phone during the consultation      I have obtained the history by speaking to the patient and by reviewing the electronic health records.    Events leading up to surgery / History of presenting illness -    She has been troubled with severe   left hip pain for 1 year  .   Pain increases with activity and decreases with   Her pain is better her upon taking Tylenol/ibuprofen.    She knows not to take anti-inflammatory medication 1 week before surgery  She has had no previous left hip surgery  She is not troubled with the opposite hip    Relevant health conditions of significance for the perioperative period/ History of presenting illness -    History of endometrial cancer- Endometrial cancer s/p RALH BSO 4/21/25 (vaginal cuff brachytherapy 3/3 completed 7/15/25)  HLD  COVID infection    Lives with  in a two-story house and their bedroom is downstairs  She was a homemaker  Pets  Children -4 sons and 2 daughters all of them are grown  Pregnancies - 6   Miscarriages - none  C sections - none  Has help post op    Not known to have HTN, heart problem , Stroke/ Mini stroke ,Prediabetes , Diabetes Mellitus, Lung problem, Thyroid problem, Kidney problem, deep vein thrombosis, pulmonary embolism, acid reflux, sleep apnea, fatty liver , blood vessels stent ,   edema, mental health problems , gout, allergies

## 2025-08-04 NOTE — PROGRESS NOTES
[facility]-PreOp Consults  Progress Note    Patient Name: Diann Wasserman  MRN: [id number]  Date of Evaluation- 08/04/2025  PCP- Virginia Greco MD    Future cases for Diann Wasserman [[id number]]       Case ID Status Date Time Colin Procedure Provider Location    [id number] [facility] 8/12/2025  7:00  ARTHROPLASTY, TOTAL HIP, ANTERIOR APPROACH Xu Ritchie MD [[id number]] [facility] OR 2ND FLR            HPI:  History of present illness- I had the pleasure of meeting this pleasant 69 y.o. lady in the pre op clinic prior to her elective Orthopedic surgery. The patient is new to me .Ms Tidwell was accompanied by  Mr Morales.  Offered to have family to be on the phone during the consultation      I have obtained the history by speaking to the patient and by reviewing the electronic health records.    Events leading up to surgery / History of presenting illness -    She has been troubled with severe   left hip pain for 1 year  .   Pain increases with activity and decreases with   Her pain is better her upon taking Tylenol/ibuprofen.    She knows not to take anti-inflammatory medication 1 week before surgery  She has had no previous left hip surgery  She is not troubled with the opposite hip    Relevant health conditions of significance for the perioperative period/ History of presenting illness -    History of endometrial cancer- Endometrial cancer s/p RALH BSO 4/21/25 (vaginal cuff brachytherapy 3/3 completed 7/15/25)  HLD  COVID infection    Lives with  in a two-story house and their bedroom is downstairs  She was a homemaker  Pets  Children -4 sons and 2 daughters all of them are grown  Pregnancies - 6   Miscarriages - none  C sections - none  Has help post op    Not known to have HTN, heart problem , Stroke/ Mini stroke ,Prediabetes , Diabetes Mellitus, Lung problem, Thyroid problem, Kidney problem, deep vein thrombosis, pulmonary embolism, acid reflux, sleep apnea, fatty liver , blood vessels stent ,    "edema, mental health problems , gout, allergies              Subjective/ Objective:     Chief complaint-Preoperative evaluation, Perioperative Medical management, complication reduction plan     Active cardiac conditions- none    Revised cardiac risk index predictors- none    Functional capacity -Examples of physical activity  -housework, yard work can take 1 flight of stairs----- She can undertake all the above activities without  chest pain,chest tightness, Shortness of breath ,dizziness,lightheadedness making her exercise tolerance more,   than 4 Mets.       Review of Systems   Constitutional:  Negative for chills and fever.        No unusual weight changes       HENT:          NOELLE score  / 8      Age over 50        Eyes:         No new visual changes   Respiratory:          No cough , phlegm    No Hemoptysis   Cardiovascular:         As noted   Gastrointestinal:         No overt GI/ blood losses  Bowel movements- Regular     Endocrine:        Prednisone use > 20 mg daily for 3 weeks- none   Genitourinary:  Negative for dysuria.        No urinary hesitancy    Musculoskeletal:         As above       Skin:  Negative for rash.   Neurological:  Negative for syncope.        No unilateral weakness   Hematological:         Current use of Anticoagulants  None  She takes either extra-strength Tylenol 650 mg or 400 mg ibuprofen about 3 times in a week  She plans on holding these medication for surgery   Psychiatric/Behavioral:          No Depression,Anxiety       She has breast cancer screening    FH- No ,bleeding / venous thrombosis ,  in family            No  bleeding, cardiac problems with previous surgeries/procedures.  Medications and Allergies reviewed in epic.     Physical Exam  Blood pressure (!) 145/82, pulse 72, temperature 98.3 °F (36.8 °C), temperature source Oral, resp. rate 18, height 5' 2" (1.575 m), weight 61.7 kg (136 lb), SpO2 98%.  Sheet gown and the presence of a chaperone during physical " examination   She was comfortable to proceed with the exam without the the presence of a chaperone        Physical Exam  Constitutional- Vitals - Body mass index is 24.87 kg/m².,   Vitals:    08/04/25 1103   BP: (!) 145/82   Pulse: 72   Resp: 18   Temp: 98.3 °F (36.8 °C)     General appearance-Conscious,Coherent  Eyes- No conjunctival icterus,pupils  round  and reactive to light   ENT-Oral cavity- moist    , Hearing grossly normal   Neck- No thyromegaly ,Trachea -central, No jugular venous distension,   No Carotid Bruit   Cardiovascular -Heart Sounds- Normal  and  no murmur   , No gallop rhythm   Respiratory - Normal Respiratory Effort, Normal breath sounds,  no wheeze , and  no forced expiratory wheeze    Peripheral pitting pedal edema-- none , no calf pain   Gastrointestinal -Soft abdomen, No palpable masses, Non Tender,Liver,Spleen not palpable. No-- free fluid and shifting dullness  Musculoskeletal- No finger Clubbing. Strength grossly normal   Lymphatic-No Palpable cervical, axillary,Inguinal lymphadenopathy   Psychiatric - normal effect,Orientation  Rt Dorsalis pedis pulses-palpable    Lt Dorsalis pedis pulses- palpable   Rt Posterior tibial pulses -palpable   Left posterior tibial pulses -palpable   Miscellaneous -  no renal bruit       Investigations  Lab and Imaging have been reviewed in epic.      Review of old records- Was done and information gathered regards to events leading to surgery and health conditions of significance in the perioperative period.        Preoperative cardiac risk assessment-  The patient does not have any active cardiac conditions . Revised cardiac risk index predictors- -0--.Functional capacity is more than 4 Mets. She will be undergoing a Orthopedic procedure that carries a Moderate Risk risk     Risk of a major Cardiac event ( Defined as death, myocardial infarction, or cardiac arrest at 30 days after noncardiac surgery), based on RCRI score      -3.9%       No further cardiac  work up is indicated prior to proceeding with the surgery          American Society of Anesthesiologists Physical status classification ( ASA ) class: 2  Postoperative pulmonary complication risk assessment:      ARISCAT ( Canet) risk index- risk class -  Low, if duration of surgery is under 3 hours, intermediate, if duration of surgery is over 3 hours          Assessment/Plan:     Lumbar radiculopathy  She has not trouble with the back    Hyperlipidemia  HLD-I  suggest continuation of statin during the entire perioperative period.   Not known to have circulation problem    Endometrial cancer  She presented with postmenopausal spotting in January of 2025 after finishing with her menstrual cycles at 1 years of age    History of endometrial cancer- Endometrial cancer s/p RALH BSO 4/21/25 (vaginal cuff brachytherapy 3/3 completed 7/15/25)    She did not require chemotherapy  She had radiation treatment to the vaginal area once a week that she finished July 15th and she tolerated radiation well    She felt tired radiation therapy which she has since recovered    She currently has no vaginal spotting or bleeding    Appreciate gynecology input and we will let her proceed with the surgery    Discussed with her and her involved  that blood clots can happen with joint replacement surgery  She had endometrial cancer this year and just finished treatment for that    Discussed medication for blood clot prevention with aspirin/Eliquis  She is open with either option    Corresponded with the guide oncologists-No bleeding risk from an endometrial cancer standpoint. She is in surveillance without evidence of active disease     S/P RIGHT total hip arthroplasty  She had her hip replacement done in November 2014 and is doing well    History of COVID-19  She had COVID a few years but he has a recovered from it    Did not require hospitalization, intubation or supplemental oxygen use   Had not been vaccinated   Recovered from  COVID    COVID screening     No fever   No cough   No SOB  No sore throat   No loss of taste or smell   No muscle aches   No nausea, vomiting , diarrhea     PONV (postoperative nausea and vomiting)  She had, after returning home after hysterectomy surgery had nausea and vomiting and had to return to the emergency room    Post operative nausea, vomiting - I suggest that the perioperative team be aware of this so that appropriate preventive care can be taken          Preventive perioperative care    Thromboembolic prophylaxis:  Her risk factors for thrombosis include surgical procedure and age.I suggest  thromboembolic prophylaxis ( mechanical/pharmacological, weighing the risk benefits of pharmacological agent use considering yue procedural bleeding )  during the perioperative period.I suggested being active in the post operative period.      Postoperative pulmonary complication prophylaxis-Risk factors for post operative pulmonary complications include age over 65 years- I suggest incentive spirometry use, early ambulation, and end tidal carbon dioxide monitoring  , oral care , head end of bed elevation      Renal complication prophylaxis- . I suggest keeping her well hydrated  in the perioperative period.      Surgical site Infection Prophylaxis-I  suggest appropriate antibiotic for Prophylaxis against Surgical site infections  No reported Staph infection  Skin antibacterial discussed            In view of regional anesthesia the patient  is at risk of postoperative urinary retention.  I suggest avoidance / minimizing the of  Benzodiazepines,Anticholinergic medication,antihistamines ( Benadryl) , if possible in the perioperative period. I suggest using the minimum possible use of opioids for the minimum period of time in the perioperative period. Benadryl avoidance suggested      This visit was focused on Preoperative evaluation, Perioperative Medical management, complication reduction plans. I suggest that the  patient follows up with primary care or relevant sub specialists for ongoing health care.    I appreciate the opportunity to be involved in this patients care. Please feel free to contact me if there were any questions about this consultation.    Patient is optimized    Patient/ care giver/ Family member was instructed to call and update me about any changes to health,  medication, office visits ,testing out side of the yue operative care center , hospitalizations between now and surgery      Radha Westbrook MD  Internal Medicine  [facility]   Cell Phone- [phone]      In summary this pleasant 69 year female is heading for a hip replacement surgery on August 12th and is doing well  She recovered from endometrial cancer surgery in April and radiation that she had that she finished on July 16th  We discussed VTE prophylaxis with either aspirin/apixaban     I have spent --60---- minutes of time which includes, time spent to prepare to see the patient , obtaining history ,performing examination, counseling/Educating the patient , Documenting clinical information in the record    --    8/5- 14 44    Labs 8 /5   CBC-hemoglobin, hematocrit mildly low, platelet count normal  CMP normal  INR normal  She is taking anti-inflammatory meds in the could be contributing to the mildly low hemoglobin  She also just finished the radiation treatment    Called to discuss labs with her -unable to speak- left a message    ---    8/7- 12 44    Called to follow up , spoke to her  to address any concerns with the up coming surgery or any questions on Medication instructions -  Doing well ,No changes to Medication, Health -  Holding anti-inflammatory medicine for surgery

## 2025-08-05 ENCOUNTER — HOSPITAL ENCOUNTER (OUTPATIENT)
Dept: RADIOLOGY | Facility: HOSPITAL | Age: 70
Discharge: HOME | End: 2025-08-05
Attending: PHYSICIAN ASSISTANT
Payer: MEDICARE

## 2025-08-05 ENCOUNTER — OFFICE VISIT (OUTPATIENT)
Dept: ORTHOPEDICS | Facility: CLINIC | Age: 70
End: 2025-08-05
Payer: MEDICARE

## 2025-08-05 VITALS
DIASTOLIC BLOOD PRESSURE: 84 MMHG | SYSTOLIC BLOOD PRESSURE: 149 MMHG | HEART RATE: 66 BPM | WEIGHT: 136 LBS | HEIGHT: 62 IN | RESPIRATION RATE: 18 BRPM | BODY MASS INDEX: 25.03 KG/M2

## 2025-08-05 DIAGNOSIS — Z96.642 STATUS POST TOTAL REPLACEMENT OF LEFT HIP: ICD-10-CM

## 2025-08-05 DIAGNOSIS — M16.12 PRIMARY OSTEOARTHRITIS OF LEFT HIP: ICD-10-CM

## 2025-08-05 DIAGNOSIS — M16.12 PRIMARY OSTEOARTHRITIS OF LEFT HIP: Primary | ICD-10-CM

## 2025-08-05 PROCEDURE — 1159F MED LIST DOCD IN RCRD: CPT | Mod: CPTII,S$GLB,, | Performed by: PHYSICIAN ASSISTANT

## 2025-08-05 PROCEDURE — 1125F AMNT PAIN NOTED PAIN PRSNT: CPT | Mod: CPTII,S$GLB,, | Performed by: PHYSICIAN ASSISTANT

## 2025-08-05 PROCEDURE — 72170 X-RAY EXAM OF PELVIS: CPT | Mod: TC

## 2025-08-05 PROCEDURE — 72170 X-RAY EXAM OF PELVIS: CPT | Mod: 26,,, | Performed by: RADIOLOGY

## 2025-08-05 PROCEDURE — 99213 OFFICE O/P EST LOW 20 MIN: CPT | Mod: S$GLB,,, | Performed by: PHYSICIAN ASSISTANT

## 2025-08-05 PROCEDURE — 3008F BODY MASS INDEX DOCD: CPT | Mod: CPTII,S$GLB,, | Performed by: PHYSICIAN ASSISTANT

## 2025-08-05 PROCEDURE — 3079F DIAST BP 80-89 MM HG: CPT | Mod: CPTII,S$GLB,, | Performed by: PHYSICIAN ASSISTANT

## 2025-08-05 PROCEDURE — 3077F SYST BP >= 140 MM HG: CPT | Mod: CPTII,S$GLB,, | Performed by: PHYSICIAN ASSISTANT

## 2025-08-05 PROCEDURE — 99999 PR PBB SHADOW E&M-EST. PATIENT-LVL III: CPT | Mod: PBBFAC,,, | Performed by: PHYSICIAN ASSISTANT

## 2025-08-06 RX ORDER — SODIUM CHLORIDE 9 MG/ML
INJECTION, SOLUTION INTRAVENOUS CONTINUOUS
OUTPATIENT
Start: 2025-08-06 | End: 2025-08-07

## 2025-08-06 RX ORDER — NALOXONE HCL 0.4 MG/ML
0.02 VIAL (ML) INJECTION
OUTPATIENT
Start: 2025-08-06

## 2025-08-06 RX ORDER — LIDOCAINE HYDROCHLORIDE 10 MG/ML
1 INJECTION, SOLUTION EPIDURAL; INFILTRATION; INTRACAUDAL; PERINEURAL
OUTPATIENT
Start: 2025-08-06

## 2025-08-06 RX ORDER — MUPIROCIN 20 MG/G
1 OINTMENT TOPICAL 2 TIMES DAILY
OUTPATIENT
Start: 2025-08-06 | End: 2025-08-11

## 2025-08-06 RX ORDER — POLYETHYLENE GLYCOL 3350 17 G/17G
17 POWDER, FOR SOLUTION ORAL DAILY PRN
OUTPATIENT
Start: 2025-08-06

## 2025-08-06 RX ORDER — MIDAZOLAM HYDROCHLORIDE 1 MG/ML
4 INJECTION, SOLUTION INTRAMUSCULAR; INTRAVENOUS
OUTPATIENT
Start: 2025-08-06 | End: 2025-08-07

## 2025-08-06 RX ORDER — PREGABALIN 75 MG/1
75 CAPSULE ORAL NIGHTLY
OUTPATIENT
Start: 2025-08-06

## 2025-08-06 RX ORDER — OXYCODONE HYDROCHLORIDE 5 MG/1
10 TABLET ORAL
Refills: 0 | OUTPATIENT
Start: 2025-08-06

## 2025-08-06 RX ORDER — FENTANYL CITRATE 50 UG/ML
100 INJECTION, SOLUTION INTRAMUSCULAR; INTRAVENOUS
Refills: 0 | OUTPATIENT
Start: 2025-08-06 | End: 2025-08-07

## 2025-08-06 RX ORDER — SODIUM CHLORIDE 9 MG/ML
INJECTION, SOLUTION INTRAVENOUS
OUTPATIENT
Start: 2025-08-06

## 2025-08-06 RX ORDER — MORPHINE SULFATE 2 MG/ML
2 INJECTION, SOLUTION INTRAMUSCULAR; INTRAVENOUS
Refills: 0 | OUTPATIENT
Start: 2025-08-06

## 2025-08-06 RX ORDER — PROCHLORPERAZINE EDISYLATE 5 MG/ML
5 INJECTION INTRAMUSCULAR; INTRAVENOUS EVERY 6 HOURS PRN
OUTPATIENT
Start: 2025-08-06

## 2025-08-06 RX ORDER — MELOXICAM 15 MG/1
15 TABLET ORAL DAILY
OUTPATIENT
Start: 2025-08-06

## 2025-08-06 RX ORDER — ONDANSETRON HYDROCHLORIDE 2 MG/ML
4 INJECTION, SOLUTION INTRAVENOUS EVERY 8 HOURS PRN
OUTPATIENT
Start: 2025-08-06

## 2025-08-06 RX ORDER — FAMOTIDINE 20 MG/1
20 TABLET, FILM COATED ORAL 2 TIMES DAILY
OUTPATIENT
Start: 2025-08-06

## 2025-08-06 RX ORDER — MELOXICAM 15 MG/1
15 TABLET ORAL ONCE
OUTPATIENT
Start: 2025-08-06 | End: 2025-08-06

## 2025-08-06 RX ORDER — ACETAMINOPHEN 500 MG
1000 TABLET ORAL
OUTPATIENT
Start: 2025-08-06

## 2025-08-06 RX ORDER — OXYCODONE HYDROCHLORIDE 5 MG/1
5 TABLET ORAL
Refills: 0 | OUTPATIENT
Start: 2025-08-06

## 2025-08-06 RX ORDER — PREGABALIN 75 MG/1
75 CAPSULE ORAL
OUTPATIENT
Start: 2025-08-06

## 2025-08-06 RX ORDER — POLYETHYLENE GLYCOL 3350 17 G/17G
17 POWDER, FOR SOLUTION ORAL DAILY
OUTPATIENT
Start: 2025-08-06

## 2025-08-06 RX ORDER — METHOCARBAMOL 750 MG/1
750 TABLET, FILM COATED ORAL 3 TIMES DAILY
OUTPATIENT
Start: 2025-08-06

## 2025-08-06 RX ORDER — ACETAMINOPHEN 500 MG
1000 TABLET ORAL EVERY 6 HOURS
OUTPATIENT
Start: 2025-08-06

## 2025-08-06 RX ORDER — FENTANYL CITRATE 50 UG/ML
25 INJECTION, SOLUTION INTRAMUSCULAR; INTRAVENOUS EVERY 5 MIN PRN
Refills: 0 | OUTPATIENT
Start: 2025-08-06

## 2025-08-06 RX ORDER — AMOXICILLIN 250 MG
1 CAPSULE ORAL 2 TIMES DAILY
OUTPATIENT
Start: 2025-08-06

## 2025-08-06 RX ORDER — MUPIROCIN 20 MG/G
1 OINTMENT TOPICAL
OUTPATIENT
Start: 2025-08-06

## 2025-08-06 RX ORDER — ASPIRIN 81 MG/1
81 TABLET ORAL 2 TIMES DAILY
OUTPATIENT
Start: 2025-08-06

## 2025-08-06 NOTE — H&P
CC:  Left hip pain    Diann Wasserman is a 69 y.o. female with Left hip pain.  Pain is worse with activity and weight bearing.  Patient has experienced interference of activities of daily living due to increased pain and decreased range of motion. Patient has failed non-operative treatment including NSAIDs, as well as greater than 3 months of activity modification. Diann Wasserman ambulates independently.     Relevant medical conditions of significance in perioperative period:  HLD: controled by PCP     Given documented medical comorbidities including those listed above and based off of CMS criteria patient would meet inpatient admission status guidelines. This case has been approved as inpatient.     Past Medical History:   Diagnosis Date    Arthritis     Cataract     Iris nevus, left     Right hip pain        Past Surgical History:   Procedure Laterality Date    BREAST SURGERY Bilateral     breast lift    CHOLECYSTECTOMY  2012    COLONOSCOPY N/A 04/06/2016    Procedure: COLONOSCOPY;  Surgeon: Luis Bogran-Reyes, MD;  [facility];  Service: Endoscopy;  Laterality: N/A;    COSMETIC SURGERY  1985    bilateral breast lift    HIP SURGERY  11/2014    right total    HYSTEROSCOPY WITH DILATION AND CURETTAGE OF UTERUS N/A 3/28/2025    Procedure: HYSTEROSCOPY, WITH DILATION AND CURETTAGE OF UTERUS;  Surgeon: Samira Johnson MD;  Location: [facility];  Service: OB/GYN;  Laterality: N/A;    JOINT REPLACEMENT      LYMPH NODE BIOPSY Bilateral 4/21/2025    Procedure: BIOPSY, LYMPH NODE;  Surgeon: Tonya Farnsworth MD;  Location: [address];  Service: OB/GYN;  Laterality: Bilateral;    MAPPING, LYMPH NODE, SENTINEL Bilateral 4/21/2025    Procedure: MAPPING, LYMPH NODE, SENTINEL;  Surgeon: Tonya Farnsworth MD;  Location: [address];  Service: OB/GYN;  Laterality: Bilateral;    TOTAL REDUCTION MAMMOPLASTY      LIft    TUBAL LIGATION      XI ROBOTIC HYSTERECTOMY, WITH SALPINGO-OOPHORECTOMY  "Bilateral 4/21/2025    Procedure: XI ROBOTIC HYSTERECTOMY,WITH SALPINGO-OOPHORECTOMY;  Surgeon: Tonya Farnsworth MD;  Location: [facility] OR [address] [place] [address];  Service: OB/GYN;  Laterality: Bilateral;  2.5 hr case       Family History   Problem Relation Name Age of Onset    Hypothyroidism Mother      Hypertension Father buzz     Aortic aneurysm Father buzz     Stroke Father buzz     No Known Problems Sister      No Known Problems Brother      Breast cancer Maternal Aunt  40    Breast cancer Cousin Annmarie        Review of patient's allergies indicates:   Allergen Reactions    Erythromycin Rash    Penicillins Rash and Other (See Comments)     Redness       Current Medications[1]    Review of Systems:   Constitutional: no fever or chills  Eyes: no visual changes  ENT: no nasal congestion or sore throat  Respiratory: no cough or shortness of breath  Cardiovascular: no chest pain or palpitations  Gastrointestinal: no nausea or vomiting, tolerating diet  Genitourinary: no hematuria or dysuria  Integument/Breast: no rash or pruritis  Hematologic/Lymphatic: no easy bruising or lymphadenopathy  Musculoskeletal: positive for hip pain  Neurological: no seizures or tremors  Behavioral/Psych: no auditory or visual hallucinations  Endocrine: no heat or cold intolerance    PE:  BP (!) 149/84   Pulse 66   Resp 18   Ht 5' 2" (1.575 m)   Wt 61.7 kg (136 lb 0.4 oz)   BMI 24.88 kg/m²   General: Pleasant, cooperative, NAD   Gait: antalgic  HEENT: NCAT, sclera nonicteric   Lungs: Respirations are clear, equal and unlabored.   CV: S1S2; 2+ bilateral upper and lower extremity pulses.   Skin: Intact throughout LE with no rashes, erythema, or lesions  Extremities: No LE edema, NVI lower extremities    Left Hip Exam:  110 degrees flexion  10 degrees internal rotation  30 degrees external rotation    There is pain with passive range of motion.     Radiographs: Radiographs reveal advanced degenerative changes including subchondral cyst formation, " subchondral sclerosis, osteophyte formation, joint space narrowing.     Diagnosis: Primary osteoarthritis Left hip    Plan: Left total hip arthroplasty     Due to the serious nature of total joint infection and the high prevalence of community acquired MRSA, vancomycin will be used perioperatively.                   [1]   Current Outpatient Medications:     acetaminophen (TYLENOL) 650 MG TbSR, Take 1 tablet by mouth every 6 hours as needed. Alternate between ibuprofen and tylenol every 3 hours. For example: @0800: ibuprofen 600mg @1100: tylenol 650mg @1400: ibuprofen 600mg @1700: tylenol 650 mg @2000: ibuprofen 600mg, Disp: 30 tablet, Rfl: 1    atorvastatin (LIPITOR) 20 MG tablet, Take 20 mg by mouth every evening., Disp: , Rfl:     cholecalciferol, vitamin D3, (VITAMIN D3) 25 mcg (1,000 unit) capsule, Take 1,000 Units by mouth once daily., Disp: , Rfl:     coenzyme Q10 (CO Q-10) 100 mg capsule, Take 100 mg by mouth once daily., Disp: , Rfl:     ibuprofen (ADVIL,MOTRIN) 600 MG tablet, Take 1 tablet by mouth every 6 hours as needed. Alternate between ibuprofen and tylenol every 3 hours. For example: @0800: ibuprofen 600mg @1100: tylenol 650mg @1400: ibuprofen 600mg @1700: tylenol 650 mg @2000: ibuprofen 600mg, Disp: 30 tablet, Rfl: 1    multivit-min/iron/folic/sbs937 (HAIR, SKIN AND NAILS ADVANCED ORAL), Take by mouth Daily., Disp: , Rfl:     multivitamin (THERAGRAN) per tablet, Take 1 tablet by mouth once daily., Disp: , Rfl:     LORazepam (ATIVAN) 0.5 MG tablet, Take 1 tablet (0.5 mg total) by mouth every 6 (six) hours as needed for Anxiety. (Patient not taking: Reported on 8/4/2025), Disp: 6 tablet, Rfl: 0

## 2025-08-06 NOTE — PROGRESS NOTES
Diann Wasserman is a 69 y.o. year old here today for preoperative visit in preparation for a Left total hip arthroplasty to be performed by  on 08/12/2025.  she was last seen and treated in the clinic on 6/18/2025. she will be medically optimized by the pre op center. There has been no significant change in medical status since last visit. No fever, chills, malaise, or unexplained weight change.      Allergies, Medications, past medical and surgical history reviewed.    Focused examination performed.    Patient did not request to see surgeon in clinic today. All questions answered. Patient encouraged to call with questions. Contact information given.     Pre, yue, and post operative procedures and expectations discussed. Goals of successful surgery reviewed and include manageable pain levels, surgical site free of infection, medication management, and ambulation with PT/OT assistance. Healthy weight management discussed with patient and caregiver who were receptive to eduction of healthy diet and activity. No other necessary lifestyle changes identified. Educated patient about signs and symptoms of infection, medication management, anticoagulation therapy, risk of tobacco and alcohol use, and self-care to promote healing. Surgical guide given for future reference. Hibiclens given to patient with instructions. All questions were answered.     Diann Wasserman verbalized an understanding to the education and goals. Patient has displayed readiness to engage in care and is ready to proceed with surgery.  Patient reports , Andrew is able and ready to provide assistance at home after discharge.    Surgical and blood consents signed.    DME will be arranged. The mobility limitation cannot be sufficiently resolved by the use of a cane. Patient's functional mobility deficit can be sufficiently resolved with the use of a (Rolling Walker or Walker). Patient's mobility limitation significantly  "impairs their ability to participate in one of more activities of daily living. The use of a (Rolling Walker or Walker) will significantly improve the patient's ability to participate in MRADLS and the patient will use it on regular basis in the home."     Diann Wasserman will contact us if there are any questions, concerns, or changes in medical status prior to surgery.       Joint class: completed    Patient has discussed discharge planning with surgeon. Patient will be discharged to home following surgery.   patient will be scheduled with Home Health during hospitalization.     30 minutes of time was spent on patient education, review of records, templating, H&P, , appointment scheduling and optimizing patient for surgery.    "

## 2025-08-11 ENCOUNTER — TELEPHONE (OUTPATIENT)
Dept: ORTHOPEDICS | Facility: CLINIC | Age: 70
End: 2025-08-11
Payer: MEDICARE

## 2025-08-11 ENCOUNTER — PATIENT MESSAGE (OUTPATIENT)
Dept: ORTHOPEDICS | Facility: CLINIC | Age: 70
End: 2025-08-11
Payer: MEDICARE

## 2025-08-12 ENCOUNTER — HOSPITAL ENCOUNTER (OUTPATIENT)
Facility: HOSPITAL | Age: 70
Discharge: HOME | End: 2025-08-12
Attending: ORTHOPAEDIC SURGERY | Admitting: ORTHOPAEDIC SURGERY
Payer: MEDICARE

## 2025-08-12 ENCOUNTER — ANESTHESIA (OUTPATIENT)
Dept: SURGERY | Facility: HOSPITAL | Age: 70
End: 2025-08-12
Payer: MEDICARE

## 2025-08-12 VITALS
TEMPERATURE: 98 F | WEIGHT: 136 LBS | SYSTOLIC BLOOD PRESSURE: 115 MMHG | OXYGEN SATURATION: 98 % | HEART RATE: 62 BPM | DIASTOLIC BLOOD PRESSURE: 59 MMHG | RESPIRATION RATE: 12 BRPM | HEIGHT: 62 IN | BODY MASS INDEX: 25.03 KG/M2

## 2025-08-12 DIAGNOSIS — Z96.642 STATUS POST TOTAL REPLACEMENT OF LEFT HIP: ICD-10-CM

## 2025-08-12 DIAGNOSIS — M79.609 PAIN IN EXTREMITY, UNSPECIFIED EXTREMITY: ICD-10-CM

## 2025-08-12 DIAGNOSIS — M16.12 PRIMARY OSTEOARTHRITIS OF LEFT HIP: ICD-10-CM

## 2025-08-12 DIAGNOSIS — Z01.818 PRE-OP TESTING: Primary | ICD-10-CM

## 2025-08-12 PROCEDURE — 63600175 PHARM REV CODE 636 W HCPCS: Performed by: PHYSICIAN ASSISTANT

## 2025-08-12 PROCEDURE — 71000033 HC RECOVERY, INTIAL HOUR: Performed by: ORTHOPAEDIC SURGERY

## 2025-08-12 PROCEDURE — 97161 PT EVAL LOW COMPLEX 20 MIN: CPT

## 2025-08-12 PROCEDURE — 71000044 HC DOSC ROUTINE RECOVERY FIRST HOUR: Performed by: ORTHOPAEDIC SURGERY

## 2025-08-12 PROCEDURE — 25000003 PHARM REV CODE 250: Performed by: PHYSICIAN ASSISTANT

## 2025-08-12 PROCEDURE — C1776 JOINT DEVICE (IMPLANTABLE): HCPCS | Performed by: ORTHOPAEDIC SURGERY

## 2025-08-12 PROCEDURE — 97535 SELF CARE MNGMENT TRAINING: CPT

## 2025-08-12 PROCEDURE — 97165 OT EVAL LOW COMPLEX 30 MIN: CPT

## 2025-08-12 PROCEDURE — 36000711: Performed by: ORTHOPAEDIC SURGERY

## 2025-08-12 PROCEDURE — C1713 ANCHOR/SCREW BN/BN,TIS/BN: HCPCS | Performed by: ORTHOPAEDIC SURGERY

## 2025-08-12 PROCEDURE — 63600175 PHARM REV CODE 636 W HCPCS

## 2025-08-12 PROCEDURE — 63600175 PHARM REV CODE 636 W HCPCS: Performed by: ANESTHESIOLOGY

## 2025-08-12 PROCEDURE — 63600175 PHARM REV CODE 636 W HCPCS: Performed by: NURSE ANESTHETIST, CERTIFIED REGISTERED

## 2025-08-12 PROCEDURE — 63600175 PHARM REV CODE 636 W HCPCS: Performed by: ORTHOPAEDIC SURGERY

## 2025-08-12 PROCEDURE — 97116 GAIT TRAINING THERAPY: CPT

## 2025-08-12 PROCEDURE — 27130 TOTAL HIP ARTHROPLASTY: CPT | Mod: LT,,, | Performed by: ORTHOPAEDIC SURGERY

## 2025-08-12 PROCEDURE — 37000009 HC ANESTHESIA EA ADD 15 MINS: Performed by: ORTHOPAEDIC SURGERY

## 2025-08-12 PROCEDURE — 71000015 HC POSTOP RECOV 1ST HR: Performed by: ORTHOPAEDIC SURGERY

## 2025-08-12 PROCEDURE — 37000008 HC ANESTHESIA 1ST 15 MINUTES: Performed by: ORTHOPAEDIC SURGERY

## 2025-08-12 PROCEDURE — 27201423 OPTIME MED/SURG SUP & DEVICES STERILE SUPPLY: Performed by: ORTHOPAEDIC SURGERY

## 2025-08-12 PROCEDURE — 94761 N-INVAS EAR/PLS OXIMETRY MLT: CPT

## 2025-08-12 PROCEDURE — 71000016 HC POSTOP RECOV ADDL HR: Performed by: ORTHOPAEDIC SURGERY

## 2025-08-12 PROCEDURE — 25000003 PHARM REV CODE 250: Performed by: ORTHOPAEDIC SURGERY

## 2025-08-12 PROCEDURE — 36000710: Performed by: ORTHOPAEDIC SURGERY

## 2025-08-12 PROCEDURE — 25000003 PHARM REV CODE 250: Performed by: NURSE ANESTHETIST, CERTIFIED REGISTERED

## 2025-08-12 DEVICE — INSERT TRIDENT X3 ID 36MM 0 DE: Type: IMPLANTABLE DEVICE | Site: HIP | Status: FUNCTIONAL

## 2025-08-12 DEVICE — SHELL TRIDENT II ACET E 52MM: Type: IMPLANTABLE DEVICE | Site: HIP | Status: FUNCTIONAL

## 2025-08-12 DEVICE — SCREW TRIDENT II LP HEX 6.5X15: Type: IMPLANTABLE DEVICE | Site: HIP | Status: FUNCTIONAL

## 2025-08-12 DEVICE — IMPLANTABLE DEVICE: Type: IMPLANTABLE DEVICE | Site: HIP | Status: FUNCTIONAL

## 2025-08-12 DEVICE — SCREW TRIDENT II LP HEX 6.5X30: Type: IMPLANTABLE DEVICE | Site: HIP | Status: FUNCTIONAL

## 2025-08-12 DEVICE — HEAD V40 BIOLOX 36MM -2.5: Type: IMPLANTABLE DEVICE | Site: HIP | Status: FUNCTIONAL

## 2025-08-12 RX ORDER — LIDOCAINE HYDROCHLORIDE 10 MG/ML
1 INJECTION, SOLUTION EPIDURAL; INFILTRATION; INTRACAUDAL; PERINEURAL
Status: DISCONTINUED | OUTPATIENT
Start: 2025-08-12 | End: 2025-08-12 | Stop reason: HOSPADM

## 2025-08-12 RX ORDER — LIDOCAINE HYDROCHLORIDE AND EPINEPHRINE 15; 5 MG/ML; UG/ML
INJECTION, SOLUTION EPIDURAL
Status: DISCONTINUED | OUTPATIENT
Start: 2025-08-12 | End: 2025-08-12

## 2025-08-12 RX ORDER — VANCOMYCIN HYDROCHLORIDE 1 G/20ML
INJECTION, POWDER, LYOPHILIZED, FOR SOLUTION INTRAVENOUS
Status: DISCONTINUED | OUTPATIENT
Start: 2025-08-12 | End: 2025-08-12 | Stop reason: HOSPADM

## 2025-08-12 RX ORDER — LIDOCAINE HYDROCHLORIDE 20 MG/ML
INJECTION INTRAVENOUS
Status: DISCONTINUED | OUTPATIENT
Start: 2025-08-12 | End: 2025-08-12

## 2025-08-12 RX ORDER — FENTANYL CITRATE 50 UG/ML
25 INJECTION, SOLUTION INTRAMUSCULAR; INTRAVENOUS EVERY 5 MIN PRN
Status: DISCONTINUED | OUTPATIENT
Start: 2025-08-12 | End: 2025-08-12 | Stop reason: HOSPADM

## 2025-08-12 RX ORDER — OXYCODONE HYDROCHLORIDE 10 MG/1
10 TABLET ORAL
Status: DISCONTINUED | OUTPATIENT
Start: 2025-08-12 | End: 2025-08-12 | Stop reason: HOSPADM

## 2025-08-12 RX ORDER — FENTANYL CITRATE 50 UG/ML
INJECTION, SOLUTION INTRAMUSCULAR; INTRAVENOUS
Status: DISCONTINUED | OUTPATIENT
Start: 2025-08-12 | End: 2025-08-12

## 2025-08-12 RX ORDER — DOCUSATE SODIUM 100 MG/1
100 CAPSULE, LIQUID FILLED ORAL 2 TIMES DAILY
Qty: 60 CAPSULE | Refills: 0 | Status: SHIPPED | OUTPATIENT
Start: 2025-08-12

## 2025-08-12 RX ORDER — METHOCARBAMOL 750 MG/1
750 TABLET, FILM COATED ORAL 4 TIMES DAILY PRN
Qty: 40 TABLET | Refills: 0 | Status: SHIPPED | OUTPATIENT
Start: 2025-08-12

## 2025-08-12 RX ORDER — SODIUM CHLORIDE 9 MG/ML
INJECTION, SOLUTION INTRAVENOUS
Status: COMPLETED | OUTPATIENT
Start: 2025-08-12 | End: 2025-08-12

## 2025-08-12 RX ORDER — CEFAZOLIN 2 G/1
INJECTION, POWDER, FOR SOLUTION INTRAMUSCULAR; INTRAVENOUS
Status: DISCONTINUED | OUTPATIENT
Start: 2025-08-12 | End: 2025-08-12

## 2025-08-12 RX ORDER — ATORVASTATIN CALCIUM 10 MG/1
20 TABLET, FILM COATED ORAL NIGHTLY
Status: DISCONTINUED | OUTPATIENT
Start: 2025-08-12 | End: 2025-08-12 | Stop reason: HOSPADM

## 2025-08-12 RX ORDER — NALOXONE HCL 0.4 MG/ML
0.02 VIAL (ML) INJECTION
Status: DISCONTINUED | OUTPATIENT
Start: 2025-08-12 | End: 2025-08-12 | Stop reason: HOSPADM

## 2025-08-12 RX ORDER — ONDANSETRON HYDROCHLORIDE 2 MG/ML
INJECTION, SOLUTION INTRAVENOUS
Status: DISCONTINUED | OUTPATIENT
Start: 2025-08-12 | End: 2025-08-12

## 2025-08-12 RX ORDER — MELOXICAM 15 MG/1
15 TABLET ORAL DAILY
Status: DISCONTINUED | OUTPATIENT
Start: 2025-08-13 | End: 2025-08-12 | Stop reason: HOSPADM

## 2025-08-12 RX ORDER — MIDAZOLAM HYDROCHLORIDE 1 MG/ML
4 INJECTION, SOLUTION INTRAMUSCULAR; INTRAVENOUS
Status: DISCONTINUED | OUTPATIENT
Start: 2025-08-12 | End: 2025-08-12 | Stop reason: HOSPADM

## 2025-08-12 RX ORDER — ONDANSETRON 4 MG/1
4 TABLET, ORALLY DISINTEGRATING ORAL EVERY 8 HOURS PRN
Qty: 10 TABLET | Refills: 0 | Status: SHIPPED | OUTPATIENT
Start: 2025-08-12 | End: 2025-08-13

## 2025-08-12 RX ORDER — MUPIROCIN 20 MG/G
1 OINTMENT TOPICAL 2 TIMES DAILY
Status: DISCONTINUED | OUTPATIENT
Start: 2025-08-12 | End: 2025-08-12 | Stop reason: HOSPADM

## 2025-08-12 RX ORDER — ASPIRIN 81 MG/1
81 TABLET ORAL 2 TIMES DAILY
Status: DISCONTINUED | OUTPATIENT
Start: 2025-08-12 | End: 2025-08-12

## 2025-08-12 RX ORDER — MELOXICAM 15 MG/1
15 TABLET ORAL ONCE
Status: DISCONTINUED | OUTPATIENT
Start: 2025-08-12 | End: 2025-08-12 | Stop reason: HOSPADM

## 2025-08-12 RX ORDER — PROCHLORPERAZINE EDISYLATE 5 MG/ML
5 INJECTION INTRAMUSCULAR; INTRAVENOUS EVERY 6 HOURS PRN
Status: DISCONTINUED | OUTPATIENT
Start: 2025-08-12 | End: 2025-08-12 | Stop reason: HOSPADM

## 2025-08-12 RX ORDER — DEXMEDETOMIDINE HYDROCHLORIDE 100 UG/ML
INJECTION, SOLUTION INTRAVENOUS
Status: DISCONTINUED | OUTPATIENT
Start: 2025-08-12 | End: 2025-08-12

## 2025-08-12 RX ORDER — PHENYLEPHRINE HYDROCHLORIDE 10 MG/ML
INJECTION INTRAVENOUS
Status: DISCONTINUED | OUTPATIENT
Start: 2025-08-12 | End: 2025-08-12

## 2025-08-12 RX ORDER — SCOPOLAMINE 1 MG/3D
PATCH, EXTENDED RELEASE TRANSDERMAL
Status: DISCONTINUED
Start: 2025-08-12 | End: 2025-08-12 | Stop reason: HOSPADM

## 2025-08-12 RX ORDER — ROPIVACAINE/EPI/CLONIDINE/KET 2.46-0.005
SYRINGE (ML) INJECTION
Status: DISCONTINUED | OUTPATIENT
Start: 2025-08-12 | End: 2025-08-12 | Stop reason: HOSPADM

## 2025-08-12 RX ORDER — FAMOTIDINE 20 MG/1
20 TABLET, FILM COATED ORAL 2 TIMES DAILY
Qty: 60 TABLET | Refills: 0 | Status: SHIPPED | OUTPATIENT
Start: 2025-08-12

## 2025-08-12 RX ORDER — PROPOFOL 10 MG/ML
VIAL (ML) INTRAVENOUS
Status: DISCONTINUED | OUTPATIENT
Start: 2025-08-12 | End: 2025-08-12

## 2025-08-12 RX ORDER — ONDANSETRON HYDROCHLORIDE 2 MG/ML
4 INJECTION, SOLUTION INTRAVENOUS EVERY 8 HOURS PRN
Status: DISCONTINUED | OUTPATIENT
Start: 2025-08-12 | End: 2025-08-12 | Stop reason: HOSPADM

## 2025-08-12 RX ORDER — CELECOXIB 200 MG/1
200 CAPSULE ORAL DAILY
Qty: 30 CAPSULE | Refills: 0 | Status: SHIPPED | OUTPATIENT
Start: 2025-08-12

## 2025-08-12 RX ORDER — OXYCODONE HYDROCHLORIDE 5 MG/1
5 TABLET ORAL EVERY 6 HOURS PRN
Qty: 30 TABLET | Refills: 0 | Status: SHIPPED | OUTPATIENT
Start: 2025-08-12

## 2025-08-12 RX ORDER — PREGABALIN 75 MG/1
75 CAPSULE ORAL NIGHTLY
Status: DISCONTINUED | OUTPATIENT
Start: 2025-08-12 | End: 2025-08-12 | Stop reason: HOSPADM

## 2025-08-12 RX ORDER — POLYETHYLENE GLYCOL 3350 17 G/17G
17 POWDER, FOR SOLUTION ORAL DAILY PRN
Status: DISCONTINUED | OUTPATIENT
Start: 2025-08-12 | End: 2025-08-12 | Stop reason: HOSPADM

## 2025-08-12 RX ORDER — OXYCODONE HYDROCHLORIDE 5 MG/1
5 TABLET ORAL
Status: DISCONTINUED | OUTPATIENT
Start: 2025-08-12 | End: 2025-08-12 | Stop reason: HOSPADM

## 2025-08-12 RX ORDER — FENTANYL CITRATE 50 UG/ML
100 INJECTION, SOLUTION INTRAMUSCULAR; INTRAVENOUS
Status: DISCONTINUED | OUTPATIENT
Start: 2025-08-12 | End: 2025-08-12 | Stop reason: HOSPADM

## 2025-08-12 RX ORDER — PREGABALIN 75 MG/1
75 CAPSULE ORAL
Status: COMPLETED | OUTPATIENT
Start: 2025-08-12 | End: 2025-08-12

## 2025-08-12 RX ORDER — POLYETHYLENE GLYCOL 3350 17 G/17G
17 POWDER, FOR SOLUTION ORAL DAILY
Status: DISCONTINUED | OUTPATIENT
Start: 2025-08-12 | End: 2025-08-12 | Stop reason: HOSPADM

## 2025-08-12 RX ORDER — FAMOTIDINE 20 MG/1
20 TABLET, FILM COATED ORAL 2 TIMES DAILY
Status: DISCONTINUED | OUTPATIENT
Start: 2025-08-12 | End: 2025-08-12 | Stop reason: HOSPADM

## 2025-08-12 RX ORDER — SODIUM CHLORIDE 9 MG/ML
INJECTION, SOLUTION INTRAVENOUS CONTINUOUS
Status: DISCONTINUED | OUTPATIENT
Start: 2025-08-12 | End: 2025-08-12 | Stop reason: HOSPADM

## 2025-08-12 RX ORDER — MUPIROCIN 20 MG/G
1 OINTMENT TOPICAL
Status: COMPLETED | OUTPATIENT
Start: 2025-08-12 | End: 2025-08-12

## 2025-08-12 RX ORDER — ACETAMINOPHEN 500 MG
1000 TABLET ORAL
Status: COMPLETED | OUTPATIENT
Start: 2025-08-12 | End: 2025-08-12

## 2025-08-12 RX ORDER — METHOCARBAMOL 750 MG/1
750 TABLET, FILM COATED ORAL 3 TIMES DAILY
Status: DISCONTINUED | OUTPATIENT
Start: 2025-08-12 | End: 2025-08-12 | Stop reason: HOSPADM

## 2025-08-12 RX ORDER — DEXTROMETHORPHAN HYDROBROMIDE, GUAIFENESIN 5; 100 MG/5ML; MG/5ML
650 LIQUID ORAL EVERY 8 HOURS
Qty: 120 TABLET | Refills: 0 | Status: SHIPPED | OUTPATIENT
Start: 2025-08-12

## 2025-08-12 RX ORDER — ACETAMINOPHEN 500 MG
1000 TABLET ORAL EVERY 6 HOURS
Status: DISCONTINUED | OUTPATIENT
Start: 2025-08-12 | End: 2025-08-12 | Stop reason: HOSPADM

## 2025-08-12 RX ORDER — AMOXICILLIN 250 MG
1 CAPSULE ORAL 2 TIMES DAILY
Status: DISCONTINUED | OUTPATIENT
Start: 2025-08-12 | End: 2025-08-12 | Stop reason: HOSPADM

## 2025-08-12 RX ORDER — MORPHINE SULFATE 2 MG/ML
2 INJECTION, SOLUTION INTRAMUSCULAR; INTRAVENOUS
Status: DISCONTINUED | OUTPATIENT
Start: 2025-08-12 | End: 2025-08-12

## 2025-08-12 RX ORDER — NAPROXEN SODIUM 220 MG/1
81 TABLET, FILM COATED ORAL 2 TIMES DAILY
Qty: 60 TABLET | Refills: 0 | Status: SHIPPED | OUTPATIENT
Start: 2025-08-12 | End: 2025-08-12 | Stop reason: HOSPADM

## 2025-08-12 RX ADMIN — TRANEXAMIC ACID 1000 MG: 100 INJECTION, SOLUTION INTRAVENOUS at 07:08

## 2025-08-12 RX ADMIN — TRANEXAMIC ACID 1000 MG: 100 INJECTION, SOLUTION INTRAVENOUS at 09:08

## 2025-08-12 RX ADMIN — PHENYLEPHRINE HYDROCHLORIDE 100 MCG: 10 INJECTION INTRAVENOUS at 09:08

## 2025-08-12 RX ADMIN — PHENYLEPHRINE HYDROCHLORIDE 0.4 MCG/KG/MIN: 10 INJECTION INTRAVENOUS at 07:08

## 2025-08-12 RX ADMIN — PROPOFOL 125 MCG/KG/MIN: 10 INJECTION, EMULSION INTRAVENOUS at 09:08

## 2025-08-12 RX ADMIN — ACETAMINOPHEN 1000 MG: 500 TABLET ORAL at 05:08

## 2025-08-12 RX ADMIN — PREGABALIN 75 MG: 75 CAPSULE ORAL at 05:08

## 2025-08-12 RX ADMIN — ONDANSETRON 4 MG: 2 INJECTION INTRAMUSCULAR; INTRAVENOUS at 10:08

## 2025-08-12 RX ADMIN — PROPOFOL 200 MCG/KG/MIN: 10 INJECTION, EMULSION INTRAVENOUS at 07:08

## 2025-08-12 RX ADMIN — FENTANYL CITRATE 25 MCG: 50 INJECTION, SOLUTION INTRAMUSCULAR; INTRAVENOUS at 07:08

## 2025-08-12 RX ADMIN — CEFAZOLIN 2 G: 2 INJECTION, POWDER, FOR SOLUTION INTRAMUSCULAR; INTRAVENOUS at 07:08

## 2025-08-12 RX ADMIN — FAMOTIDINE 20 MG: 20 TABLET, FILM COATED ORAL at 10:08

## 2025-08-12 RX ADMIN — SODIUM CHLORIDE: 0.9 INJECTION, SOLUTION INTRAVENOUS at 07:08

## 2025-08-12 RX ADMIN — FENTANYL CITRATE 25 MCG: 50 INJECTION INTRAMUSCULAR; INTRAVENOUS at 11:08

## 2025-08-12 RX ADMIN — VANCOMYCIN HYDROCHLORIDE 1000 MG: 1 INJECTION, POWDER, LYOPHILIZED, FOR SOLUTION INTRAVENOUS at 06:08

## 2025-08-12 RX ADMIN — OXYCODONE HYDROCHLORIDE 5 MG: 5 TABLET ORAL at 10:08

## 2025-08-12 RX ADMIN — ONDANSETRON 4 MG: 2 INJECTION INTRAMUSCULAR; INTRAVENOUS at 07:08

## 2025-08-12 RX ADMIN — POLYETHYLENE GLYCOL 3350 17 G: 17 POWDER, FOR SOLUTION ORAL at 10:08

## 2025-08-12 RX ADMIN — LIDOCAINE HYDROCHLORIDE,EPINEPHRINE BITARTRATE 3 ML: 15; .005 INJECTION, SOLUTION EPIDURAL; INFILTRATION; INTRACAUDAL; PERINEURAL at 09:08

## 2025-08-12 RX ADMIN — LIDOCAINE HYDROCHLORIDE 50 MG: 20 INJECTION INTRAVENOUS at 07:08

## 2025-08-12 RX ADMIN — METHOCARBAMOL 750 MG: 750 TABLET ORAL at 10:08

## 2025-08-12 RX ADMIN — PHENYLEPHRINE HYDROCHLORIDE 100 MCG: 10 INJECTION INTRAVENOUS at 07:08

## 2025-08-12 RX ADMIN — MEPIVACAINE HYDROCHLORIDE 3.5 ML: 15 INJECTION, SOLUTION EPIDURAL; INFILTRATION at 07:08

## 2025-08-12 RX ADMIN — PROPOFOL 50 MG: 10 INJECTION, EMULSION INTRAVENOUS at 07:08

## 2025-08-12 RX ADMIN — SENNOSIDES AND DOCUSATE SODIUM 1 TABLET: 50; 8.6 TABLET ORAL at 10:08

## 2025-08-12 RX ADMIN — FENTANYL CITRATE 50 MCG: 50 INJECTION, SOLUTION INTRAMUSCULAR; INTRAVENOUS at 07:08

## 2025-08-12 RX ADMIN — MUPIROCIN 1 G: 20 OINTMENT TOPICAL at 05:08

## 2025-08-12 RX ADMIN — PHENYLEPHRINE HYDROCHLORIDE 200 MCG: 10 INJECTION INTRAVENOUS at 09:08

## 2025-08-12 RX ADMIN — ACETAMINOPHEN 1000 MG: 500 TABLET ORAL at 11:08

## 2025-08-12 RX ADMIN — SODIUM CHLORIDE: 9 INJECTION, SOLUTION INTRAVENOUS at 10:08

## 2025-08-12 RX ADMIN — DEXMEDETOMIDINE 8 MCG: 100 INJECTION, SOLUTION, CONCENTRATE INTRAVENOUS at 07:08

## 2025-08-12 RX ADMIN — SODIUM CHLORIDE: 9 INJECTION, SOLUTION INTRAVENOUS at 05:08

## 2025-08-13 ENCOUNTER — PATIENT MESSAGE (OUTPATIENT)
Dept: ORTHOPEDICS | Facility: CLINIC | Age: 70
End: 2025-08-13
Payer: MEDICARE

## 2025-08-13 RX ORDER — PROMETHAZINE HYDROCHLORIDE 12.5 MG/1
12.5 TABLET ORAL EVERY 6 HOURS PRN
Qty: 20 TABLET | Refills: 0 | Status: SHIPPED | OUTPATIENT
Start: 2025-08-13

## 2025-08-14 ENCOUNTER — DOCUMENTATION ONLY (OUTPATIENT)
Dept: ORTHOPEDICS | Facility: HOSPITAL | Age: 70
End: 2025-08-14
Payer: MEDICARE

## 2025-08-18 ENCOUNTER — PATIENT MESSAGE (OUTPATIENT)
Dept: ORTHOPEDICS | Facility: CLINIC | Age: 70
End: 2025-08-18
Payer: MEDICARE

## 2025-08-19 ENCOUNTER — PATIENT MESSAGE (OUTPATIENT)
Dept: ADMINISTRATIVE | Facility: OTHER | Age: 70
End: 2025-08-19
Payer: MEDICARE

## 2025-08-26 ENCOUNTER — OFFICE VISIT (OUTPATIENT)
Dept: ORTHOPEDICS | Facility: CLINIC | Age: 70
End: 2025-08-26
Payer: MEDICARE

## 2025-08-26 VITALS — WEIGHT: 136 LBS | HEIGHT: 62 IN | BODY MASS INDEX: 25.03 KG/M2

## 2025-08-26 DIAGNOSIS — Z96.642 STATUS POST TOTAL REPLACEMENT OF LEFT HIP: Primary | ICD-10-CM

## 2025-08-26 PROCEDURE — 1125F AMNT PAIN NOTED PAIN PRSNT: CPT | Mod: CPTII,S$GLB,, | Performed by: PHYSICIAN ASSISTANT

## 2025-08-26 PROCEDURE — 1159F MED LIST DOCD IN RCRD: CPT | Mod: CPTII,S$GLB,, | Performed by: PHYSICIAN ASSISTANT

## 2025-08-26 PROCEDURE — 99024 POSTOP FOLLOW-UP VISIT: CPT | Mod: S$GLB,,, | Performed by: PHYSICIAN ASSISTANT

## 2025-08-26 PROCEDURE — 99999 PR PBB SHADOW E&M-EST. PATIENT-LVL III: CPT | Mod: PBBFAC,,, | Performed by: PHYSICIAN ASSISTANT

## 2025-08-26 RX ORDER — OXYCODONE HYDROCHLORIDE 5 MG/1
5 TABLET ORAL EVERY 6 HOURS PRN
Qty: 30 TABLET | Refills: 0 | Status: SHIPPED | OUTPATIENT
Start: 2025-08-26

## 2025-09-02 ENCOUNTER — CLINICAL SUPPORT (OUTPATIENT)
Dept: REHABILITATION | Facility: HOSPITAL | Age: 70
End: 2025-09-02
Payer: MEDICARE

## 2025-09-02 DIAGNOSIS — R29.898 WEAKNESS OF LEFT LOWER EXTREMITY: ICD-10-CM

## 2025-09-02 DIAGNOSIS — M25.552 PAIN OF LEFT HIP: Primary | ICD-10-CM

## 2025-09-02 PROCEDURE — 97161 PT EVAL LOW COMPLEX 20 MIN: CPT

## 2025-09-02 PROCEDURE — 97112 NEUROMUSCULAR REEDUCATION: CPT

## 2025-09-02 PROCEDURE — 97110 THERAPEUTIC EXERCISES: CPT

## (undated) DEVICE — PAD PINK TRENDELENBURG POS XL

## (undated) DEVICE — BLADE SURG CARBON STEEL SZ11

## (undated) DEVICE — DRAPE COLUMN DAVINCI XI

## (undated) DEVICE — HOOD T7 W/ PEEL AWAY LENS

## (undated) DEVICE — ELECTRODE REM PLYHSV RETURN 9

## (undated) DEVICE — SOL NACL IRR 3000ML

## (undated) DEVICE — SYR 50CC LL

## (undated) DEVICE — IRRIGATOR ENDOSCOPY DISP.

## (undated) DEVICE — TRAY MINOR GEN SURG OMC

## (undated) DEVICE — SUT VICRYL BR 1 GEN 27 CT-1

## (undated) DEVICE — SUT VICRYL+ 1 CT1 18IN

## (undated) DEVICE — PACK ECLIPSE UNIVERSAL STERILE

## (undated) DEVICE — LEGGING CLEAR POLY 2/PACK

## (undated) DEVICE — SUT PROLENE 0 MO6 30IN BLUE

## (undated) DEVICE — GOWN SURGICAL X-LARGE

## (undated) DEVICE — NDL INSUF ULTRA VERESS 120MM

## (undated) DEVICE — SEALER AQUAMANTYS 3.48MM

## (undated) DEVICE — ELECTRODE MEGADYNE RETURN DUAL

## (undated) DEVICE — SPONGE COTTON TRAY 4X4IN

## (undated) DEVICE — SUT VICRYL PLUS 2-0 CT1 18

## (undated) DEVICE — PULSAVAC ZIMMER

## (undated) DEVICE — DRAPE IOBAN 2 STERI

## (undated) DEVICE — CONTAINER SPECIMEN OR STER 4OZ

## (undated) DEVICE — SOL ELECTROLUBE ANTI-STIC

## (undated) DEVICE — MANIPULATOR VCARE PLUS 34MM

## (undated) DEVICE — SEAL UNIVERSAL 5MM-8MM XI

## (undated) DEVICE — KIT TOTAL HIP HPOFH OMC

## (undated) DEVICE — DRAPE C-ARM ELAS CLIP 42X120IN

## (undated) DEVICE — TRAY CATH 1-LYR URIMTR 16FR

## (undated) DEVICE — KIT PT CARE HANA PROFX SSXT

## (undated) DEVICE — LUBRICANT SURGILUBE 2 OZ

## (undated) DEVICE — DRAPE ARM DAVINCI XI

## (undated) DEVICE — SUT STRATAFIX 3-0 30CM

## (undated) DEVICE — BLADE SAG 18.0X1.27X100

## (undated) DEVICE — DRAPE SCOPE PILLOW WARMER

## (undated) DEVICE — PACK FLUENT DISPOSABLE

## (undated) DEVICE — GLOVE SENSICARE PI SURG 6.5

## (undated) DEVICE — SOL POVIDONE SCRUB IODINE 4 OZ

## (undated) DEVICE — KIT ANTIFOG W/SPONG & FLUID

## (undated) DEVICE — SUT MCRYL PLUS 4-0 PS2 27IN

## (undated) DEVICE — DRAPE ABDOMINAL TIBURON 14X11

## (undated) DEVICE — COVER LIGHT HANDLE 80/CA

## (undated) DEVICE — GLOVE SENSICARE PI GRN 7

## (undated) DEVICE — OBTURATOR BLADELESS 8MM XI

## (undated) DEVICE — DRAPE THREE-QTR REINF 53X77IN

## (undated) DEVICE — NDL HYPO STD REG BVL 18GX1.5IN

## (undated) DEVICE — TRAY SKIN SCRUB WET PREMIUM

## (undated) DEVICE — DRAPE UINDERBUT GRAD PCH

## (undated) DEVICE — SEAL LENS SCOPE MYOSURE

## (undated) DEVICE — DRESSING AQUACEL AG RBBN 2X45

## (undated) DEVICE — SET BASIN 48X48IN 6000ML RING

## (undated) DEVICE — PENCIL ROCKER SWITCH 10FT CORD

## (undated) DEVICE — Device

## (undated) DEVICE — COVER TIP CURVED SCISSORS XI

## (undated) DEVICE — ADHESIVE DERMABOND ADVANCED

## (undated) DEVICE — GOWN AERO CHROME W/ TOWEL XL

## (undated) DEVICE — SUT VICRYL PLUS 0 CT1 18IN

## (undated) DEVICE — TAPE SURG DURAPORE 2 X10YD

## (undated) DEVICE — COVER LIGHT HANDLE

## (undated) DEVICE — SOL POVIDONE PREP IODINE 4 OZ

## (undated) DEVICE — DRAPE STERI U-SHAPED 47X51IN

## (undated) DEVICE — DEVICE MYOSURE REACH

## (undated) DEVICE — SCISSOR 5MMX35CM DIRECT DRIVE

## (undated) DEVICE — SET TRI-LUMEN FILTERED TUBE

## (undated) DEVICE — SYR 10CC LUER LOCK

## (undated) DEVICE — SOL IRR SOD CHL .9% POUR